# Patient Record
Sex: MALE | Race: WHITE | Employment: OTHER | URBAN - NONMETROPOLITAN AREA
[De-identification: names, ages, dates, MRNs, and addresses within clinical notes are randomized per-mention and may not be internally consistent; named-entity substitution may affect disease eponyms.]

---

## 2022-02-16 ENCOUNTER — OFFICE VISIT (OUTPATIENT)
Dept: FAMILY MEDICINE CLINIC | Age: 87
End: 2022-02-16

## 2022-02-16 VITALS
RESPIRATION RATE: 17 BRPM | OXYGEN SATURATION: 98 % | SYSTOLIC BLOOD PRESSURE: 130 MMHG | HEART RATE: 57 BPM | TEMPERATURE: 97.8 F | DIASTOLIC BLOOD PRESSURE: 60 MMHG

## 2022-02-16 DIAGNOSIS — I10 HYPERTENSION, UNSPECIFIED TYPE: Primary | ICD-10-CM

## 2022-02-16 DIAGNOSIS — Z79.01 WARFARIN ANTICOAGULATION: ICD-10-CM

## 2022-02-16 DIAGNOSIS — R39.14 BENIGN PROSTATIC HYPERPLASIA WITH INCOMPLETE BLADDER EMPTYING: ICD-10-CM

## 2022-02-16 DIAGNOSIS — N40.1 BENIGN PROSTATIC HYPERPLASIA WITH INCOMPLETE BLADDER EMPTYING: ICD-10-CM

## 2022-02-16 DIAGNOSIS — R60.0 LOWER EXTREMITY EDEMA: ICD-10-CM

## 2022-02-16 DIAGNOSIS — I48.20 CHRONIC ATRIAL FIBRILLATION (HCC): ICD-10-CM

## 2022-02-16 PROBLEM — N40.0 BPH (BENIGN PROSTATIC HYPERPLASIA): Status: ACTIVE | Noted: 2022-02-16

## 2022-02-16 LAB
INTERNATIONAL NORMALIZATION RATIO, POC: ABNORMAL
PROTHROMBIN TIME, POC: 1.7

## 2022-02-16 PROCEDURE — 85610 PROTHROMBIN TIME: CPT | Performed by: FAMILY MEDICINE

## 2022-02-16 PROCEDURE — 99204 OFFICE O/P NEW MOD 45 MIN: CPT | Performed by: FAMILY MEDICINE

## 2022-02-16 RX ORDER — FUROSEMIDE 40 MG/1
40 TABLET ORAL DAILY
Qty: 60 TABLET | Refills: 3 | Status: SHIPPED | OUTPATIENT
Start: 2022-02-16 | End: 2022-08-22 | Stop reason: SDUPTHER

## 2022-02-16 RX ORDER — ISOSORBIDE DINITRATE 30 MG/1
30 TABLET ORAL 4 TIMES DAILY
COMMUNITY
End: 2022-02-16 | Stop reason: SDUPTHER

## 2022-02-16 RX ORDER — FLUTICASONE PROPIONATE 110 UG/1
1 AEROSOL, METERED RESPIRATORY (INHALATION) 2 TIMES DAILY
Qty: 12 G | Refills: 1 | Status: SHIPPED | OUTPATIENT
Start: 2022-02-16 | End: 2022-10-04

## 2022-02-16 RX ORDER — AMLODIPINE BESYLATE 10 MG/1
10 TABLET ORAL DAILY
Qty: 90 TABLET | Refills: 0 | Status: SHIPPED | OUTPATIENT
Start: 2022-02-16 | End: 2022-06-02

## 2022-02-16 RX ORDER — WARFARIN SODIUM 2.5 MG/1
2.5 TABLET ORAL DAILY
Qty: 30 TABLET | Refills: 0 | Status: SHIPPED | OUTPATIENT
Start: 2022-02-16 | End: 2022-03-04 | Stop reason: DRUGHIGH

## 2022-02-16 RX ORDER — FLUTICASONE PROPIONATE 110 UG/1
1 AEROSOL, METERED RESPIRATORY (INHALATION) 2 TIMES DAILY
COMMUNITY
End: 2022-02-16 | Stop reason: SDUPTHER

## 2022-02-16 RX ORDER — CARVEDILOL 6.25 MG/1
6.25 TABLET ORAL 2 TIMES DAILY WITH MEALS
Qty: 60 TABLET | Refills: 2 | Status: SHIPPED | OUTPATIENT
Start: 2022-02-16 | End: 2022-05-31 | Stop reason: SDUPTHER

## 2022-02-16 RX ORDER — DIPHENHYDRAMINE HCL 25 MG
25 CAPSULE ORAL EVERY 6 HOURS PRN
COMMUNITY
End: 2022-02-16 | Stop reason: SDUPTHER

## 2022-02-16 RX ORDER — HYDRALAZINE HYDROCHLORIDE 25 MG/1
25 TABLET, FILM COATED ORAL 3 TIMES DAILY
Qty: 90 TABLET | Refills: 2 | Status: SHIPPED | OUTPATIENT
Start: 2022-02-16 | End: 2022-06-08 | Stop reason: SDUPTHER

## 2022-02-16 RX ORDER — TERAZOSIN 5 MG/1
5 CAPSULE ORAL NIGHTLY
COMMUNITY
End: 2022-02-16 | Stop reason: SDUPTHER

## 2022-02-16 RX ORDER — ALBUTEROL SULFATE 90 UG/1
2 AEROSOL, METERED RESPIRATORY (INHALATION) EVERY 6 HOURS PRN
COMMUNITY
End: 2022-02-16 | Stop reason: SDUPTHER

## 2022-02-16 RX ORDER — CARVEDILOL 6.25 MG/1
6.25 TABLET ORAL 2 TIMES DAILY WITH MEALS
COMMUNITY
End: 2022-02-16 | Stop reason: SDUPTHER

## 2022-02-16 RX ORDER — HYDRALAZINE HYDROCHLORIDE 25 MG/1
25 TABLET, FILM COATED ORAL 3 TIMES DAILY
COMMUNITY
End: 2022-02-16 | Stop reason: SDUPTHER

## 2022-02-16 RX ORDER — WARFARIN SODIUM 2.5 MG/1
2.5 TABLET ORAL
COMMUNITY
End: 2022-02-16 | Stop reason: SDUPTHER

## 2022-02-16 RX ORDER — TERAZOSIN 5 MG/1
5 CAPSULE ORAL NIGHTLY
Qty: 30 CAPSULE | Refills: 3 | Status: SHIPPED | OUTPATIENT
Start: 2022-02-16 | End: 2022-06-20 | Stop reason: SDUPTHER

## 2022-02-16 RX ORDER — POTASSIUM CHLORIDE 20 MEQ/1
20 TABLET, EXTENDED RELEASE ORAL DAILY
Qty: 180 TABLET | Refills: 1 | Status: SHIPPED | OUTPATIENT
Start: 2022-02-16

## 2022-02-16 RX ORDER — ALBUTEROL SULFATE 90 UG/1
2 AEROSOL, METERED RESPIRATORY (INHALATION) EVERY 6 HOURS PRN
Qty: 18 G | Refills: 0 | Status: SHIPPED | OUTPATIENT
Start: 2022-02-16 | End: 2022-10-04

## 2022-02-16 RX ORDER — AMLODIPINE BESYLATE 10 MG/1
10 TABLET ORAL DAILY
COMMUNITY
End: 2022-02-16 | Stop reason: SDUPTHER

## 2022-02-16 RX ORDER — ISOSORBIDE DINITRATE 30 MG/1
30 TABLET ORAL 4 TIMES DAILY
Qty: 120 TABLET | Refills: 2 | Status: SHIPPED | OUTPATIENT
Start: 2022-02-16 | End: 2022-10-04

## 2022-02-16 RX ORDER — DIPHENHYDRAMINE HCL 25 MG
25 CAPSULE ORAL EVERY 6 HOURS PRN
Qty: 30 CAPSULE | Refills: 2 | Status: SHIPPED | OUTPATIENT
Start: 2022-02-16 | End: 2022-03-18

## 2022-02-16 NOTE — PROGRESS NOTES
[facility]  [address]  [address] [place] [address]  Dept: [phone]  Loc: [phone]        ASSESSMENT/PLAN:    1. Hypertension, unspecified type  2. Chronic atrial fibrillation (HCC)  -     Potassium; Future  -     Comprehensive Metabolic Panel; Future  3. Benign prostatic hyperplasia with incomplete bladder emptying  4. Lower extremity edema  5. Warfarin anticoagulation  -     POCT INR  Patient reports a history of hypertension, chronic A. fib, BPH and lower extremity edema. Patient reports that he was put on Lasix, however, he ran out of his Lasix and has noticed increased swelling of his bilateral lower extremity. Patient has signed a record release so we can get his records from [place] to see how much cardiac work-up patient has had. Based on records, may refer patient to cardiology and or pulmonology. Patient and family are agreeable to follow-up in 4 to 6 weeks    Return in about 4 weeks (around 3/16/2022). Patient's Name: Jalen Santoro   YOB: 1935   Age: 80 y.o. Date of service: 2/16/2022    Chief Complaint:   Chief Complaint   Patient presents with   [place] Establish Care    COPD    Peripheral Neuropathy        Patient ID: Jalen Santoro is a 80 y.o. male. Chief Complaint   Patient presents with    Establish Care    COPD    Peripheral Neuropathy       HPI    Patient is an 80-year-old male with a history of incarceration, COPD, hypertension and chronic A. fib who presents to the office accompanied by daughter and son-in-law for medication review. Patient was recently released from group home in [place] and was sent home with 30-day supply of his medications. Patient reports that he ran out  of his medication so he came for refill. Patient has signed a release of records so we can get his records from [place]. Patient reports a history of A. fib, BPH, hypertension and COPD.   Patient is presently on 2 L of oxygen via nasal cannula. Patient reports that he was put on oxygen about 6 months ago due to shortness of breath. 12 point review of systems were negative other than in the HPI     Physical Exam  General: Blind, wheel chair bound, oriented to time, place, person, and situation. Not in acute distress   Ear, nose, throat, Head: Normal external ears, pupils are equally round, EOMI, normocephalic/atraumatic  Heart: regular rate and rhythm, no audible murmurs, no audible friction rub  Lungs: clear to auscultation bilaterally, no audible crackles, no audible wheezes, no audible rhonchi    Abdomen: normal bowel sounds, soft abdomen, non-tender, no palpable masses  Extremities: edema present, warm, no cyanosis, no clubbing. Good capillary refill   Peripheral vascular: 2+ pulses symmetric (radial)  Neuro: sensation intact and symmetric  Psych: normal affect, normal thoughts     Patient History:  History reviewed. No pertinent past medical history. History reviewed. No pertinent surgical history. Social History     Socioeconomic History    Marital status: Single     Spouse name: Not on file    Number of children: Not on file    Years of education: Not on file    Highest education level: Not on file   Occupational History    Not on file   Tobacco Use    Smoking status: Never Smoker    Smokeless tobacco: Never Used   Vaping Use    Vaping Use: Never used   Substance and Sexual Activity    Alcohol use: Never    Drug use: Never    Sexual activity: Not on file   Other Topics Concern    Not on file   Social History Narrative    Not on file     Social Determinants of Health     Financial Resource Strain:     Difficulty of Paying Living Expenses: Not on file   Food Insecurity:     Worried About [address] in the Last Year: Not on file    Shil[place] of Food in the Last Year: Not on file   Transportation Needs:     Lack of Transportation (Medical): Not on file    Lack of Transportation (Non-Medical):  Not 108 (90 Base) MCG/ACT inhaler Inhale 2 puffs into the lungs every 6 hours as needed for Wheezing or Shortness of Breath 18 g 0    furosemide (LASIX) 40 MG tablet Take 1 tablet by mouth daily 60 tablet 3    potassium chloride (KLOR-CON M) 20 MEQ extended release tablet Take 1 tablet by mouth daily 180 tablet 1     No current facility-administered medications for this visit. Objective:  Vitals:  Vitals:    02/16/22 1037   BP: 130/60   Pulse: 57   Resp: 17   Temp: 97.8 °F (36.6 °C)   SpO2: 98%      BP Readings from Last 4 Encounters:   02/16/22 130/60      There is no height or weight on file to calculate BMI. All questions answered to patient's satisfaction. The patient was counseled regarding impressions, instructions for management and importance of compliance with treatment. Return in about 4 weeks (around 3/16/2022).     iSndi Asif MD

## 2022-02-25 ENCOUNTER — TELEPHONE (OUTPATIENT)
Dept: FAMILY MEDICINE CLINIC | Age: 87
End: 2022-02-25

## 2022-02-25 NOTE — TELEPHONE ENCOUNTER
Called and spoke with the daughter-no records as of yet-her son was able to get them-she will fax them to you

## 2022-02-25 NOTE — TELEPHONE ENCOUNTER
Pt. Daughter called to see if we have received records for her father. I did not see any in epic. Please advise/contact pt. With updated information regarding records.

## 2022-03-04 ENCOUNTER — TELEPHONE (OUTPATIENT)
Dept: FAMILY MEDICINE CLINIC | Age: 87
End: 2022-03-04

## 2022-03-04 DIAGNOSIS — I48.20 CHRONIC ATRIAL FIBRILLATION (HCC): Primary | ICD-10-CM

## 2022-03-04 RX ORDER — WARFARIN SODIUM 5 MG/1
5 TABLET ORAL DAILY
Qty: 30 TABLET | Refills: 3 | Status: SHIPPED | OUTPATIENT
Start: 2022-03-04 | End: 2022-06-21

## 2022-03-04 NOTE — TELEPHONE ENCOUNTER
Called spoke with son discussed dosages for his warfarin and isobide-error was placed at first appt-instructions given regarding meds and instructions-they verbalize understanding

## 2022-03-04 NOTE — TELEPHONE ENCOUNTER
Patients daughter calling states they have questions about medications. Warfarin     Isosorbide dinitrate 30mg    These meds have been changed. Please call for clarification.

## 2022-03-21 ENCOUNTER — HOSPITAL ENCOUNTER (OUTPATIENT)
Age: 87
Discharge: HOME | End: 2022-03-21

## 2022-03-21 LAB
ALBUMIN SERPL-MCNC: 4.3 GM/DL (ref 3.4–5)
ALP BLD-CCNC: 87 IU/L (ref 40–128)
ALT SERPL-CCNC: 7 U/L (ref 10–40)
ANION GAP SERPL CALCULATED.3IONS-SCNC: 17 MMOL/L (ref 4–16)
AST SERPL-CCNC: 15 IU/L (ref 15–37)
BILIRUB SERPL-MCNC: 0.5 MG/DL (ref 0–1)
BUN BLDV-MCNC: 29 MG/DL (ref 6–23)
CALCIUM SERPL-MCNC: 8.7 MG/DL (ref 8.3–10.6)
CHLORIDE BLD-SCNC: 103 MMOL/L (ref 99–110)
CO2: 22 MMOL/L (ref 21–32)
CREAT SERPL-MCNC: 2.2 MG/DL (ref 0.9–1.3)
GFR AFRICAN AMERICAN: 35 ML/MIN/1.73M2
GFR NON-AFRICAN AMERICAN: 29 ML/MIN/1.73M2
GLUCOSE BLD-MCNC: 126 MG/DL (ref 70–99)
POTASSIUM SERPL-SCNC: 4.7 MMOL/L (ref 3.5–5.1)
SODIUM BLD-SCNC: 142 MMOL/L (ref 135–145)
TOTAL PROTEIN: 7.4 GM/DL (ref 6.4–8.2)

## 2022-03-21 PROCEDURE — 36415 COLL VENOUS BLD VENIPUNCTURE: CPT

## 2022-03-21 PROCEDURE — 80053 COMPREHEN METABOLIC PANEL: CPT

## 2022-03-22 ENCOUNTER — OFFICE VISIT (OUTPATIENT)
Dept: FAMILY MEDICINE CLINIC | Age: 87
End: 2022-03-22

## 2022-03-22 VITALS
WEIGHT: 200.2 LBS | TEMPERATURE: 97 F | SYSTOLIC BLOOD PRESSURE: 110 MMHG | RESPIRATION RATE: 14 BRPM | HEART RATE: 82 BPM | DIASTOLIC BLOOD PRESSURE: 60 MMHG | OXYGEN SATURATION: 95 %

## 2022-03-22 DIAGNOSIS — I10 HYPERTENSION, UNSPECIFIED TYPE: ICD-10-CM

## 2022-03-22 DIAGNOSIS — Z79.01 WARFARIN ANTICOAGULATION: ICD-10-CM

## 2022-03-22 DIAGNOSIS — I48.20 CHRONIC ATRIAL FIBRILLATION (HCC): ICD-10-CM

## 2022-03-22 LAB
INTERNATIONAL NORMALIZATION RATIO, POC: 1.6
PROTHROMBIN TIME, POC: ABNORMAL

## 2022-03-22 PROCEDURE — 99214 OFFICE O/P EST MOD 30 MIN: CPT | Performed by: FAMILY MEDICINE

## 2022-03-22 PROCEDURE — 85610 PROTHROMBIN TIME: CPT | Performed by: FAMILY MEDICINE

## 2022-03-22 ASSESSMENT — PATIENT HEALTH QUESTIONNAIRE - PHQ9
SUM OF ALL RESPONSES TO PHQ QUESTIONS 1-9: 0
SUM OF ALL RESPONSES TO PHQ QUESTIONS 1-9: 0
SUM OF ALL RESPONSES TO PHQ9 QUESTIONS 1 & 2: 0
2. FEELING DOWN, DEPRESSED OR HOPELESS: 0
1. LITTLE INTEREST OR PLEASURE IN DOING THINGS: 0
SUM OF ALL RESPONSES TO PHQ QUESTIONS 1-9: 0
SUM OF ALL RESPONSES TO PHQ QUESTIONS 1-9: 0

## 2022-03-22 NOTE — PROGRESS NOTES
[facility]  [address]  [address] [place] [address]  Dept: [phone]  Loc: [phone]        ASSESSMENT/PLAN:    1. Chronic atrial fibrillation ([address]  2. Hypertension, unspecified type  3. Warfarin anticoagulation  -     POCT INR  Patient reports taking his medication as prescribed. Patient denies any episodes of exacerbation of his A. fib. Blood pressure in the office today is controlled, patient was encouraged to continue taking medication as prescribed  INR is 1.6 in the office today, patient was told to take Coumadin 10 mg today and return to his daily comadin dose and to recheck INR in 2 weeks     Return in about 6 months (around 9/22/2022). Patient's Name: Jose Francisco Aleman   YOB: 1935   Age: 80 y.o. Date of service: 3/22/2022    Chief Complaint:   Chief Complaint   Patient presents with    Follow-up     patient is here for 1 month follow up     Discuss Labs     discuss labs         Patient ID: Jose Francisco Aleman is a 80 y.o. male. Chief Complaint   Patient presents with    Follow-up     patient is here for 1 month follow up     Discuss Labs     discuss labs        HPI    Patient is a 80-year-old male with a history of chronic A. fib, not in exacerbation, hypertension, chronic anticoagulation and other medical conditions who presents to the office accompanied by daughter and son-in-law for follow-up. Patient reports taking his medications as prescribed. Patient reports pressure sore on his buttocks which he has been using OTC therapy with some help. Patient inquired whether if he takes testosterone it will help with his heart muscles. Patient was educated on the use of testosterone. 12 point review of systems were negative other than in the HPI     Physical Exam  General: blind on a wheel chair,  and oriented to time, place, person, and situation.  Not in acute distress   Ear, nose, throat, Head: Normal external ears, pupils are equally round, EOMI, normocephalic/atraumatic  Heart: regular rate and rhythm, no audible murmurs, no audible friction rub  Lungs: clear to auscultation bilaterally, no audible crackles, no audible wheezes, no audible rhonchi    : Healing pressure sore present on buttocks,mild erythema,  no drainage,   Abdomen: normal bowel sounds, soft abdomen, non-tender, no palpable masses  Extremities: Mild bilateral lower extremity edema, compression stockings in place. Good capillary refill  Peripheral vascular: 2+ pulses symmetric (radial)  Neuro: sensation intact and symmetric  Psych: normal affect, normal thoughts     Patient History:  History reviewed. No pertinent past medical history. No past surgical history on file. Social History     Socioeconomic History    Marital status: Single     Spouse name: Not on file    Number of children: Not on file    Years of education: Not on file    Highest education level: Not on file   Occupational History    Not on file   Tobacco Use    Smoking status: Never Smoker    Smokeless tobacco: Never Used   Vaping Use    Vaping Use: Never used   Substance and Sexual Activity    Alcohol use: Never    Drug use: Never    Sexual activity: Not on file   Other Topics Concern    Not on file   Social History Narrative    Not on file     Social Determinants of Health     Financial Resource Strain:     Difficulty of Paying Living Expenses: Not on file   Food Insecurity:     Worried About [address] [employer] in the Last Year: Not on file    Shilpa of Food in the Last Year: Not on file   Transportation Needs:     Lack of Transportation (Medical): Not on file    Lack of Transportation (Non-Medical):  Not on file   Physical Activity:     Days of Exercise per Week: Not on file    Minutes of Exercise per Session: Not on file   Stress:     Feeling of Stress : Not on file   Social Connections:     Frequency of Communication with Friends and Family: Not on file    Frequency of Social Gatherings with Friends and Family: Not on file    Attends Yazdanism Services: Not on file    Active Member of Clubs or Organizations: Not on file    Attends Club or Organization Meetings: Not on file    Marital Status: Not on file   Intimate Partner Violence:     Fear of Current or Ex-Partner: Not on file    Emotionally Abused: Not on file    Physically Abused: Not on file    Sexually Abused: Not on file   Housing Stability:     Unable to Pay for Housing in the Last Year: Not on file    Number of [place] in the Last Year: Not on file    Unstable Housing in the Last Year: Not on file       There is no immunization history on file for this patient. No Known Allergies  No family history on file.       Current Outpatient Medications   Medication Sig Dispense Refill    warfarin (COUMADIN) 5 MG tablet Take 1 tablet by mouth daily 30 tablet 3    Multiple Vitamins-Minerals (ICAPS AREDS 2 PO) Take by mouth      terazosin (HYTRIN) 5 MG capsule Take 1 capsule by mouth nightly 30 capsule 3    isosorbide dinitrate (ISORDIL) 30 MG tablet Take 1 tablet by mouth 4 times daily (Patient taking differently: Take 30 mg by mouth daily ) 120 tablet 2    hydrALAZINE (APRESOLINE) 25 MG tablet Take 1 tablet by mouth 3 times daily 90 tablet 2    fluticasone (FLOVENT HFA) 110 MCG/ACT inhaler Inhale 1 puff into the lungs 2 times daily 12 g 1    carvedilol (COREG) 6.25 MG tablet Take 1 tablet by mouth 2 times daily (with meals) 60 tablet 2    amLODIPine (NORVASC) 10 MG tablet Take 1 tablet by mouth daily 90 tablet 0    albuterol sulfate HFA (PROAIR HFA) 108 (90 Base) MCG/ACT inhaler Inhale 2 puffs into the lungs every 6 hours as needed for Wheezing or Shortness of Breath 18 g 0    furosemide (LASIX) 40 MG tablet Take 1 tablet by mouth daily 60 tablet 3    potassium chloride (KLOR-CON M) 20 MEQ extended release tablet Take 1 tablet by mouth daily 180 tablet 1     No current facility-administered medications for this visit. Objective:  Vitals:  Vitals:    03/22/22 1035   BP: 110/60   Pulse: 82   Resp: 14   Temp: 97 °F (36.1 °C)   SpO2: 95%      BP Readings from Last 4 Encounters:   03/22/22 110/60   02/16/22 130/60      There is no height or weight on file to calculate BMI. All questions answered to patient's satisfaction. The patient was counseled regarding impressions, instructions for management and importance of compliance with treatment. Return in about 6 months (around 9/22/2022).     Penny Schuler MD

## 2022-04-05 ENCOUNTER — ANTI-COAG VISIT (OUTPATIENT)
Dept: FAMILY MEDICINE CLINIC | Age: 87
End: 2022-04-05

## 2022-04-05 ENCOUNTER — NURSE ONLY (OUTPATIENT)
Dept: FAMILY MEDICINE CLINIC | Age: 87
End: 2022-04-05

## 2022-04-05 VITALS — TEMPERATURE: 97.2 F

## 2022-04-05 DIAGNOSIS — I48.20 CHRONIC ATRIAL FIBRILLATION (HCC): Primary | ICD-10-CM

## 2022-04-05 DIAGNOSIS — Z79.01 WARFARIN ANTICOAGULATION: ICD-10-CM

## 2022-04-05 LAB
INTERNATIONAL NORMALIZATION RATIO, POC: ABNORMAL
PROTHROMBIN TIME, POC: 1.9

## 2022-04-05 PROCEDURE — 85610 PROTHROMBIN TIME: CPT | Performed by: FAMILY MEDICINE

## 2022-04-08 ENCOUNTER — ANTI-COAG VISIT (OUTPATIENT)
Dept: FAMILY MEDICINE CLINIC | Age: 87
End: 2022-04-08

## 2022-04-19 ENCOUNTER — NURSE ONLY (OUTPATIENT)
Dept: FAMILY MEDICINE CLINIC | Age: 87
End: 2022-04-19

## 2022-04-19 VITALS — TEMPERATURE: 98.4 F

## 2022-04-19 DIAGNOSIS — I48.20 CHRONIC ATRIAL FIBRILLATION (HCC): ICD-10-CM

## 2022-04-19 DIAGNOSIS — Z79.01 WARFARIN ANTICOAGULATION: Primary | ICD-10-CM

## 2022-04-19 LAB
INTERNATIONAL NORMALIZATION RATIO, POC: 1.6
PROTHROMBIN TIME, POC: ABNORMAL

## 2022-04-19 PROCEDURE — 85610 PROTHROMBIN TIME: CPT | Performed by: FAMILY MEDICINE

## 2022-04-25 ENCOUNTER — NURSE ONLY (OUTPATIENT)
Dept: FAMILY MEDICINE CLINIC | Age: 87
End: 2022-04-25

## 2022-04-25 DIAGNOSIS — I48.20 CHRONIC ATRIAL FIBRILLATION (HCC): Primary | ICD-10-CM

## 2022-04-25 LAB — INTERNATIONAL NORMALIZATION RATIO, POC: 2.4

## 2022-04-25 PROCEDURE — 85610 PROTHROMBIN TIME: CPT | Performed by: FAMILY MEDICINE

## 2022-04-26 ENCOUNTER — ANTI-COAG VISIT (OUTPATIENT)
Dept: FAMILY MEDICINE CLINIC | Age: 87
End: 2022-04-26

## 2022-05-24 ENCOUNTER — ANTI-COAG VISIT (OUTPATIENT)
Dept: FAMILY MEDICINE CLINIC | Age: 87
End: 2022-05-24

## 2022-05-31 ENCOUNTER — TELEPHONE (OUTPATIENT)
Dept: FAMILY MEDICINE CLINIC | Age: 87
End: 2022-05-31

## 2022-05-31 RX ORDER — CARVEDILOL 6.25 MG/1
6.25 TABLET ORAL 2 TIMES DAILY WITH MEALS
Qty: 60 TABLET | Refills: 0 | Status: SHIPPED | OUTPATIENT
Start: 2022-05-31 | End: 2022-07-05 | Stop reason: SDUPTHER

## 2022-05-31 NOTE — TELEPHONE ENCOUNTER
----- Message from Jose Ling sent at 5/31/2022 10:24 AM EDT -----  Subject: Refill Request    QUESTIONS  Name of Medication? carvedilol (COREG) 6.25 MG tablet  Patient-reported dosage and instructions? twice a day   How many days do you have left? 3  Preferred [facility]? [id number]  Pharmacy phone number (if available)? [phone]  ---------------------------------------------------------------------------  --------------,  Name of Medication? amLODIPine (NORVASC) 10 MG tablet  Patient-reported dosage and instructions? once a day   How many days do you have left? 5  [facility]erred [facility]? [id number]  Pharmacy phone number (if available)? [phone]  ---------------------------------------------------------------------------  --------------  CALL BACK INFO  What is the best way for the office to contact you? OK to leave message on   voicemail  Preferred Call Back Phone Number? [phone]  ---------------------------------------------------------------------------  --------------  SCRIPT ANSWERS  Relationship to Patient? Other  Representative Name? daughter  Is the Representative on the appropriate HIPAA document in Epic?  Yes

## 2022-05-31 NOTE — TELEPHONE ENCOUNTER
----- Message from Damaris Stroud sent at 5/31/2022 10:24 AM EDT -----  Subject: Refill Request    QUESTIONS  Name of Medication? carvedilol (COREG) 6.25 MG tablet  Patient-reported dosage and instructions? twice a day   How many days do you have left? 3  [facility]erred [id number]  Pharmacy phone number (if available)? [phone]  ---------------------------------------------------------------------------  --------------,  Name of Medication? amLODIPine (NORVASC) 10 MG tablet  Patient-reported dosage and instructions? once a day   How many days do you have left? 5  [facility]erred [facility]? [id number]  Pharmacy phone number (if available)? [phone]  ---------------------------------------------------------------------------  --------------  CALL BACK INFO  What is the best way for the office to contact you? OK to leave message on   voicemail  Preferred Call Back Phone Number? [phone]  ---------------------------------------------------------------------------  --------------  SCRIPT ANSWERS  Relationship to Patient? Other  Representative Name? daughter  Is the Representative on the appropriate HIPAA document in Epic?  Yes

## 2022-05-31 NOTE — TELEPHONE ENCOUNTER
----- Message from Jose Ling sent at 5/31/2022 10:24 AM EDT -----  Subject: Refill Request    QUESTIONS  Name of Medication? carvedilol (COREG) 6.25 MG tablet  Patient-reported dosage and instructions? twice a day   How many days do you have left? 3  Preferred [facility]? [id number]  Pharmacy phone number (if available)? [phone]  ---------------------------------------------------------------------------  --------------,  Name of Medication? amLODIPine (NORVASC) 10 MG tablet  Patient-reported dosage and instructions? once a day   How many days do you have left? 5  [facility]erred [facility]? [id number]  Pharmacy phone number (if available)? [phone]  ---------------------------------------------------------------------------  --------------  CALL BACK INFO  What is the best way for the office to contact you? OK to leave message on   voicemail  Preferred Call Back Phone Number? [phone]  ---------------------------------------------------------------------------  --------------  SCRIPT ANSWERS  Relationship to Patient? Other  Representative Name? daughter  Is the Representative on the appropriate HIPAA document in Epic?  Yes

## 2022-05-31 NOTE — TELEPHONE ENCOUNTER
----- Message from Tammie Robertson sent at 5/31/2022 10:24 AM EDT -----  Subject: Refill Request    QUESTIONS  Name of Medication? carvedilol (COREG) 6.25 MG tablet  Patient-reported dosage and instructions? twice a day   How many days do you have left? 3  [facility]erred [facility]? [id number]  Pharmacy phone number (if available)? [phone]  ---------------------------------------------------------------------------  --------------,  Name of Medication? amLODIPine (NORVASC) 10 MG tablet  Patient-reported dosage and instructions? once a day   How many days do you have left? 5  Preferred [facility]? [id number]  Pharmacy phone number (if available)? [phone]  ---------------------------------------------------------------------------  --------------  CALL BACK INFO  What is the best way for the office to contact you? OK to leave message on   voicemail  Preferred Call Back Phone Number? [phone]  ---------------------------------------------------------------------------  --------------  SCRIPT ANSWERS  Relationship to Patient? Other  Representative Name? daughter  Is the Representative on the appropriate HIPAA document in Epic?  Yes

## 2022-06-02 RX ORDER — AMLODIPINE BESYLATE 10 MG/1
TABLET ORAL
Qty: 30 TABLET | Refills: 0 | Status: SHIPPED | OUTPATIENT
Start: 2022-06-02 | End: 2022-07-05

## 2022-06-08 ENCOUNTER — ANTI-COAG VISIT (OUTPATIENT)
Dept: FAMILY MEDICINE CLINIC | Age: 87
End: 2022-06-08

## 2022-06-08 RX ORDER — HYDRALAZINE HYDROCHLORIDE 25 MG/1
25 TABLET, FILM COATED ORAL 3 TIMES DAILY
Qty: 90 TABLET | Refills: 0 | Status: SHIPPED | OUTPATIENT
Start: 2022-06-08 | End: 2022-07-05 | Stop reason: SDUPTHER

## 2022-06-20 DIAGNOSIS — I48.20 CHRONIC ATRIAL FIBRILLATION (HCC): ICD-10-CM

## 2022-06-21 DIAGNOSIS — I48.20 CHRONIC ATRIAL FIBRILLATION (HCC): ICD-10-CM

## 2022-06-21 RX ORDER — WARFARIN SODIUM 5 MG/1
TABLET ORAL
Qty: 30 TABLET | Refills: 3 | Status: SHIPPED | OUTPATIENT
Start: 2022-06-21 | End: 2022-08-22

## 2022-06-21 RX ORDER — TERAZOSIN 5 MG/1
5 CAPSULE ORAL NIGHTLY
Qty: 30 CAPSULE | Refills: 3 | Status: SHIPPED | OUTPATIENT
Start: 2022-06-21

## 2022-06-21 RX ORDER — WARFARIN SODIUM 5 MG/1
5 TABLET ORAL DAILY
Qty: 30 TABLET | Refills: 3 | Status: SHIPPED | OUTPATIENT
Start: 2022-06-21

## 2022-07-05 ENCOUNTER — ANTI-COAG VISIT (OUTPATIENT)
Dept: FAMILY MEDICINE CLINIC | Age: 87
End: 2022-07-05

## 2022-07-05 RX ORDER — CARVEDILOL 6.25 MG/1
6.25 TABLET ORAL 2 TIMES DAILY WITH MEALS
Qty: 60 TABLET | Refills: 0 | Status: SHIPPED | OUTPATIENT
Start: 2022-07-05

## 2022-07-05 RX ORDER — HYDRALAZINE HYDROCHLORIDE 25 MG/1
25 TABLET, FILM COATED ORAL 3 TIMES DAILY
Qty: 90 TABLET | Refills: 0 | Status: SHIPPED | OUTPATIENT
Start: 2022-07-05 | End: 2022-10-04

## 2022-07-05 RX ORDER — AMLODIPINE BESYLATE 10 MG/1
TABLET ORAL
Qty: 30 TABLET | Refills: 0 | Status: SHIPPED | OUTPATIENT
Start: 2022-07-05

## 2022-07-05 RX ORDER — AMLODIPINE BESYLATE 10 MG/1
TABLET ORAL
Qty: 30 TABLET | Refills: 0 | Status: SHIPPED | OUTPATIENT
Start: 2022-07-05 | End: 2022-07-05 | Stop reason: SDUPTHER

## 2022-07-14 ENCOUNTER — ANTI-COAG VISIT (OUTPATIENT)
Dept: FAMILY MEDICINE CLINIC | Age: 87
End: 2022-07-14

## 2022-08-04 ENCOUNTER — TELEPHONE (OUTPATIENT)
Dept: CARDIOLOGY CLINIC | Age: 87
End: 2022-08-04

## 2022-08-04 ENCOUNTER — ANTI-COAG VISIT (OUTPATIENT)
Dept: FAMILY MEDICINE CLINIC | Age: 87
End: 2022-08-04

## 2022-08-04 NOTE — TELEPHONE ENCOUNTER
Left message for patient requesting a return call to schedule a consult for paroxymal atrial fibrillation & possibly some CHF per referral from [facility]. (3) no apparent problem

## 2022-08-22 ENCOUNTER — INITIAL CONSULT (OUTPATIENT)
Dept: CARDIOLOGY CLINIC | Age: 87
End: 2022-08-22
Payer: MEDICARE

## 2022-08-22 VITALS
HEART RATE: 90 BPM | RESPIRATION RATE: 16 BRPM | HEIGHT: 76 IN | SYSTOLIC BLOOD PRESSURE: 122 MMHG | DIASTOLIC BLOOD PRESSURE: 50 MMHG | WEIGHT: 214 LBS | BODY MASS INDEX: 26.06 KG/M2 | OXYGEN SATURATION: 95 %

## 2022-08-22 DIAGNOSIS — I44.7 LBBB (LEFT BUNDLE BRANCH BLOCK): ICD-10-CM

## 2022-08-22 DIAGNOSIS — I10 HYPERTENSION, UNSPECIFIED TYPE: Primary | ICD-10-CM

## 2022-08-22 DIAGNOSIS — I48.0 PAROXYSMAL ATRIAL FIBRILLATION (HCC): ICD-10-CM

## 2022-08-22 DIAGNOSIS — N18.31 STAGE 3A CHRONIC KIDNEY DISEASE (HCC): ICD-10-CM

## 2022-08-22 DIAGNOSIS — H54.8 LEGALLY BLIND: ICD-10-CM

## 2022-08-22 DIAGNOSIS — R07.2 PRECORDIAL PAIN: ICD-10-CM

## 2022-08-22 DIAGNOSIS — I48.91 ATRIAL FIBRILLATION, UNSPECIFIED TYPE (HCC): ICD-10-CM

## 2022-08-22 DIAGNOSIS — I50.33 ACUTE ON CHRONIC DIASTOLIC CONGESTIVE HEART FAILURE (HCC): ICD-10-CM

## 2022-08-22 DIAGNOSIS — R06.02 SOB (SHORTNESS OF BREATH): ICD-10-CM

## 2022-08-22 PROCEDURE — 99204 OFFICE O/P NEW MOD 45 MIN: CPT | Performed by: INTERNAL MEDICINE

## 2022-08-22 PROCEDURE — 93000 ELECTROCARDIOGRAM COMPLETE: CPT | Performed by: INTERNAL MEDICINE

## 2022-08-22 RX ORDER — FUROSEMIDE 40 MG/1
40 TABLET ORAL EVERY OTHER DAY
Qty: 60 TABLET | Refills: 3 | Status: SHIPPED | OUTPATIENT
Start: 2022-08-22

## 2022-08-22 RX ORDER — FUROSEMIDE 40 MG/1
40 TABLET ORAL EVERY OTHER DAY
Qty: 90 TABLET | Refills: 1 | Status: SHIPPED | OUTPATIENT
Start: 2022-08-22 | End: 2022-10-04

## 2022-08-22 RX ORDER — CHLORAL HYDRATE 500 MG
CAPSULE ORAL
COMMUNITY

## 2022-08-22 NOTE — ASSESSMENT & PLAN NOTE
He appears somewhat volume overloaded does have 1+ ankle edema increase Lasix to 40 twice daily on alternate days and 40 daily.   Take potassium extra when he takes extra Lasix

## 2022-08-22 NOTE — PATIENT INSTRUCTIONS
Please hold on to these instructions the  will call you within 1-9 business days when we receive authorization from your insurance. Echocardiogram    WHAT TO EXPECT:   This test will take approximately 45 minutes. It is an ultrasound of the heart. It can look at the valves and chambers inside the heart   There is no special instructions for this test.     If you are unable to keep this appointment, please notify us 24 hours prior to test at [phone]. Please hold on to these instructions the  will call you within 1-9 business days when we receive authorization from your insurance. Nuclear Stress Test    WHAT TO EXPECT:   You will need to confirm the test or it could be cancelled. This test will take approximately 2 hours: 1 hour in the AM &    1 hour in the PM. You will be given a time by the Technologist after the first part is completed to come back. You will be given a medication, through an IV in the hand, this will safely simulate exercise. This IV is also needed to inject the radioactive isotope unless you are able toe walk the treadmill. You will receive an injection in the AM & PM before the pictures. Using a special camera, you will have one set of pictures of your heart taken in the AM and a set of pictures in the PM.     PREPARATION FOR TEST:  Eat a light breakfast such as water or juice and toast.  If you are DIABETIC: Eat a normal breakfast with NO CAFFEINE and take your insulin as normal.   AVOID ALL FOODS & DRINKS containing CAFFEINE 12 HOURS PRIOR TO THE TEST: Including coffee, Tea, Nayan and other soft drinks even those labeled  caffeine free or decaffeinated. HOLD THESE MEDICATIONS Persantine & Theophylline (Theodur)  24 hours prior & bring your inhaler with you. The physician will specify if the following Beta blockers need to be held for 24 hours prior to test: [address] Laboratory Locations - No appointment necessary.   Sites open Monday to Friday. Call your preferred location for test preparation, business hours and other information you need. [employer] accepts [employer]. [facility] [address] [place], [address] [facility]  Phone: [phone] [facility] [address] [facility]  [address].   [facility], [address] [facility]  Phone: [phone]

## 2022-08-22 NOTE — ASSESSMENT & PLAN NOTE
Chest x-ray, check BNP, get echo, increase Lasix to 40 twice daily on alternate days and 40 mg daily until we get more data.   We had extensive discussion advised to cut down on salt intake

## 2022-08-22 NOTE — ASSESSMENT & PLAN NOTE
His blood pressure in the office todayCarvedilol 6.25 twice daily amlodipine 10 mg daily and he takes Hytrin 5 mg at night.

## 2022-08-22 NOTE — ASSESSMENT & PLAN NOTE
EKG today shows left bundle branch block with PVCs and sinus rhythm. He is on Coumadin. Followed by PCP?

## 2022-08-22 NOTE — PROGRESS NOTES
CARDIOLOGY  NOTE    Chief Complaint: SOB/ afib/ LBBB    HPI:   Elías Osorio is a 80y.o. year old who has Past medical history as noted below. Very pleasant gentleman comes in with his daughter and son-in-law he is just moved to [facility] from [place]. He is wheelchair-bound mostly because he is legally blind. He is here for further discussion due to bilateral lower extremity swelling and concerns for congestive heart failure. Reports that he has been  Sleeping in lounge chair, once in awhile gest chets pain, belching helps   Lower extremity swelling is ongoing he is taking Lasix his EKG shows left bundle branch block. He is short of breath when he exerts himself he is to work with hydraulic pumps and wants to understand what is going on with his heart reportedly he has history of A. fib he is on Coumadin but really had echo and stress test in [place] sometime back and used to see cardiology over there he is here for further discussion and establishing care. Have history of chronic kidney disease with creatinine 2.2 range.   Is 122 x 50 he denies feeling lightheaded or dizzy for now continue taking  Moved here from [place] started seeing Dr. Josué Zarate recently    Current Outpatient Medications   Medication Sig Dispense Refill    Oxygen Concentrator       Omega-3 Fatty Acids (OMEGA-3 FISH OIL) 1000 MG CAPS Take by mouth      diphenhydrAMINE HCl (BENADRYL ALLERGY PO) Take by mouth 25 mg 1 AM, 2 QPM      furosemide (LASIX) 40 MG tablet Take 1 tablet by mouth every other day 60 tablet 3    furosemide (LASIX) 40 MG tablet Take 1 tablet by mouth every other day 90 tablet 1    carvedilol (COREG) 6.25 MG tablet Take 1 tablet by mouth 2 times daily (with meals) 60 tablet 0    hydrALAZINE (APRESOLINE) 25 MG tablet Take 1 tablet by mouth 3 times daily 90 tablet 0    amLODIPine (NORVASC) 10 MG tablet TAKE 1 TABLET BY MOUTH EVERY DAY 30 tablet 0    terazosin (HYTRIN) 5 MG capsule Take 1 capsule by mouth nightly 30 capsule 3    warfarin (COUMADIN) 5 MG tablet Take 1 tablet by mouth daily 30 tablet 3    Multiple Vitamins-Minerals (ICAPS AREDS 2 PO) Take by mouth      isosorbide dinitrate (ISORDIL) 30 MG tablet Take 1 tablet by mouth 4 times daily (Patient taking differently: Take 30 mg by mouth daily PATIENT ONLY TAKING 1 TABLET PO ONCE DAILY) 120 tablet 2    potassium chloride (KLOR-CON M) 20 MEQ extended release tablet Take 1 tablet by mouth daily 180 tablet 1    fluticasone (FLOVENT HFA) 110 MCG/ACT inhaler Inhale 1 puff into the lungs 2 times daily (Patient not taking: Reported on 8/22/2022) 12 g 1    albuterol sulfate HFA (PROAIR HFA) 108 (90 Base) MCG/ACT inhaler Inhale 2 puffs into the lungs every 6 hours as needed for Wheezing or Shortness of Breath (Patient not taking: Reported on 8/22/2022) 18 g 0     No current facility-administered medications for this visit. Allergies:   Patient has no known allergies. Patient History:  No past medical history on file. No past surgical history on file. No family history on file. Social History     Tobacco Use    Smoking status: Never    Smokeless tobacco: Never   Substance Use Topics    Alcohol use: Never        Review of Systems:   Constitutional: No Fever or Weight Loss   Eyes: legally blind   ENT: No Headaches, Hearing Loss or Vertigo  Cardiovascular: as per note above   Respiratory: No cough or wheezing and as per note above.    Gastrointestinal: No abdominal pain, appetite loss, blood in stools, constipation, diarrhea or heartburn  Genitourinary: No dysuria, trouble voiding, or hematuria  Musculoskeletal:  denies any new  joint aches , swelling  or pain   Integumentary: No rash or pruritis  Neurological: No TIA or stroke symptoms  Psychiatric: No anxiety or depression  Endocrine: No malaise, fatigue or temperature intolerance  Hematologic/Lymphatic: No bleeding problems, blood clots or swollen lymph nodes  Allergic/Immunologic: No nasal congestion or hives    Objective:      Physical Exam:  BP (!) 122/50   Pulse 90   Resp 16   Ht 6' 4\" (1.93 m)   Wt 214 lb (97.1 kg)   SpO2 95%   BMI 26.05 kg/m²   Wt Readings from Last 3 Encounters:   08/22/22 214 lb (97.1 kg)   03/22/22 200 lb 3.2 oz (90.8 kg)     Body mass index is 26.05 kg/m². Vitals:    08/22/22 1017   BP: (!) 122/50   Pulse: 90   Resp: 16   SpO2: 95%        General Appearance:  No distress, conversant  Constitutional:  Well developed, Well nourished, No acute distress, Non-toxic appearance. HENT:  Normocephalic, Atraumatic, Bilateral external ears normal, Oropharynx moist, No oral exudates, Nose normal. Neck- Normal range of motion, No tenderness, Supple, No stridor,no apical-carotid delay  Eyes:  PERRL, EOMI, Conjunctiva normal, No discharge. Respiratory:  Normal breath sounds, No respiratory distress, No wheezing, No chest tenderness. ,no use of accessory muscles, NO crackles  Cardiovascular: (PMI) apex non displaced,no lifts no thrills,S1 and S2 audible, No added heart sounds, 1+ankle edema, or volume overload, No evidence of JVD, No crackles   GI:  Bowel sounds normal, Soft, No tenderness, No masses, No gross visceromegaly   :  No costovertebral angle tenderness   Musculoskeletal:  1+ edema, no tenderness, no deformities.  Back- no tenderness  Integument:  Well hydrated, no rash   Lymphatic:  No lymphadenopathy noted   Neurologic:  Alert & oriented x 3, CN 2-12 normal, normal motor function, normal sensory function, no focal deficits noted   Psychiatric:  Speech and behavior appropriate       Medical decision making and Data review:  DATA:  No results found for: TROPONINT  BNP:  No results found for: PROBNP  PT/INR:  No results found for: PTINR  No results found for: LABA1C  No results found for: CHOL, TRIG, HDL, LDLCALC, LDLDIRECT  Lab Results   Component Value Date    ALT 7 (L) 03/21/2022    AST 15 03/21/2022     No results for input(s): WBC, HGB, HCT, MCV, PLT in the last 72 discussed that for the  prevention of ASCVD our  goal is aggressive risk modification. Patient is encouraged to exercise if they can , educated about  brisk walk for 30 minutes  at least 3 to 4 times a week if there are no physical limitations  Various goals were discussed and questions answered. Continue current medications. Appropriate prescriptions are addressed and refills ordered. Questions answered and patient verbalizes understanding. Call for any problems, questions, or concerns. Greater than 60 % of time spent counseling besides reviewing data and images     Continue all other medications of all above medical condition listed as is. Return in about 1 month (around 9/22/2022). Please note this report has been partially produced using speech recognition software and may contain errors related to that system including errors in grammar, punctuation, and spelling, as well as words and phrases that may be inappropriate. If there are any questions or concerns please feel free to contact the dictating provider for clarification.

## 2022-09-02 ENCOUNTER — ANTI-COAG VISIT (OUTPATIENT)
Dept: FAMILY MEDICINE CLINIC | Age: 87
End: 2022-09-02

## 2022-09-07 ENCOUNTER — HOSPITAL ENCOUNTER (OUTPATIENT)
Age: 87
Discharge: HOME | End: 2022-09-07
Payer: MEDICARE

## 2022-09-07 ENCOUNTER — PROCEDURE VISIT (OUTPATIENT)
Dept: CARDIOLOGY CLINIC | Age: 87
End: 2022-09-07
Payer: MEDICARE

## 2022-09-07 ENCOUNTER — HOSPITAL ENCOUNTER (OUTPATIENT)
Dept: GENERAL RADIOLOGY | Age: 87
Discharge: HOME | End: 2022-09-07
Payer: MEDICARE

## 2022-09-07 DIAGNOSIS — I10 HYPERTENSION, UNSPECIFIED TYPE: ICD-10-CM

## 2022-09-07 DIAGNOSIS — N18.31 STAGE 3A CHRONIC KIDNEY DISEASE (HCC): ICD-10-CM

## 2022-09-07 DIAGNOSIS — R06.02 SOB (SHORTNESS OF BREATH): ICD-10-CM

## 2022-09-07 DIAGNOSIS — I44.7 LBBB (LEFT BUNDLE BRANCH BLOCK): ICD-10-CM

## 2022-09-07 DIAGNOSIS — R06.02 SOB (SHORTNESS OF BREATH): Primary | ICD-10-CM

## 2022-09-07 DIAGNOSIS — I48.91 ATRIAL FIBRILLATION, UNSPECIFIED TYPE (HCC): ICD-10-CM

## 2022-09-07 DIAGNOSIS — I48.0 PAROXYSMAL ATRIAL FIBRILLATION (HCC): ICD-10-CM

## 2022-09-07 DIAGNOSIS — R07.2 PRECORDIAL PAIN: ICD-10-CM

## 2022-09-07 DIAGNOSIS — I50.33 ACUTE ON CHRONIC DIASTOLIC CONGESTIVE HEART FAILURE (HCC): ICD-10-CM

## 2022-09-07 LAB
LV EF: 28 %
LV EF: 29 %
LVEF MODALITY: NORMAL
LVEF MODALITY: NORMAL
PRO-BNP: 3487 PG/ML

## 2022-09-07 PROCEDURE — 93017 CV STRESS TEST TRACING ONLY: CPT | Performed by: INTERNAL MEDICINE

## 2022-09-07 PROCEDURE — 71046 X-RAY EXAM CHEST 2 VIEWS: CPT

## 2022-09-07 PROCEDURE — 83880 ASSAY OF NATRIURETIC PEPTIDE: CPT

## 2022-09-07 PROCEDURE — 93306 TTE W/DOPPLER COMPLETE: CPT | Performed by: INTERNAL MEDICINE

## 2022-09-07 PROCEDURE — 93018 CV STRESS TEST I&R ONLY: CPT | Performed by: INTERNAL MEDICINE

## 2022-09-07 PROCEDURE — 93016 CV STRESS TEST SUPVJ ONLY: CPT | Performed by: INTERNAL MEDICINE

## 2022-09-07 PROCEDURE — 78452 HT MUSCLE IMAGE SPECT MULT: CPT | Performed by: INTERNAL MEDICINE

## 2022-09-07 PROCEDURE — A9500 TC99M SESTAMIBI: HCPCS | Performed by: INTERNAL MEDICINE

## 2022-09-07 PROCEDURE — 36415 COLL VENOUS BLD VENIPUNCTURE: CPT

## 2022-09-08 ENCOUNTER — TELEPHONE (OUTPATIENT)
Dept: CARDIOLOGY CLINIC | Age: 87
End: 2022-09-08

## 2022-09-08 NOTE — TELEPHONE ENCOUNTER
Summary   Left ventricular function is severely abnormal , EF is estimated at 25-30%. Dilated left ventricle noted. Moderate left ventricular hypertrophy. Grade II diastolic dysfunction. Abnormal (paradoxical) motion consistent with left bundle branch block. Global hypokinesis noted. Moderately dilated left atrium. Mild mitral regurgitation is present. No evidence of pericardial effusion. Summary  Supervising physician Dr. Hanna Salas .   Dilated left ventricle with EDV of 191 ML  Global hypokinesis with reduced EF of 29 %  Severe defect of medium size of apical wall , Septal defect of medium size  No ischemia , Abnormal stress images , ischemic cardiomyopathy  Abnormal stress test       Spoke with daughter, verbally understood to keep appt to discuss results

## 2022-09-27 ENCOUNTER — OFFICE VISIT (OUTPATIENT)
Dept: CARDIOLOGY CLINIC | Age: 87
End: 2022-09-27
Payer: MEDICARE

## 2022-09-27 VITALS
RESPIRATION RATE: 18 BRPM | SYSTOLIC BLOOD PRESSURE: 120 MMHG | WEIGHT: 214 LBS | DIASTOLIC BLOOD PRESSURE: 68 MMHG | HEIGHT: 76 IN | HEART RATE: 72 BPM | BODY MASS INDEX: 26.06 KG/M2

## 2022-09-27 DIAGNOSIS — I44.7 LBBB (LEFT BUNDLE BRANCH BLOCK): Primary | ICD-10-CM

## 2022-09-27 DIAGNOSIS — N18.4 CKD (CHRONIC KIDNEY DISEASE) STAGE 4, GFR 15-29 ML/MIN (HCC): ICD-10-CM

## 2022-09-27 DIAGNOSIS — N18.31 STAGE 3A CHRONIC KIDNEY DISEASE (HCC): ICD-10-CM

## 2022-09-27 DIAGNOSIS — I25.5 ISCHEMIC CARDIOMYOPATHY: ICD-10-CM

## 2022-09-27 DIAGNOSIS — I48.20 CHRONIC ATRIAL FIBRILLATION (HCC): ICD-10-CM

## 2022-09-27 DIAGNOSIS — R06.02 SOB (SHORTNESS OF BREATH): ICD-10-CM

## 2022-09-27 DIAGNOSIS — I48.0 PAROXYSMAL ATRIAL FIBRILLATION (HCC): ICD-10-CM

## 2022-09-27 DIAGNOSIS — R07.2 PRECORDIAL PAIN: ICD-10-CM

## 2022-09-27 DIAGNOSIS — I50.22 CHRONIC SYSTOLIC (CONGESTIVE) HEART FAILURE (HCC): ICD-10-CM

## 2022-09-27 DIAGNOSIS — I50.33 ACUTE ON CHRONIC DIASTOLIC CONGESTIVE HEART FAILURE (HCC): ICD-10-CM

## 2022-09-27 PROCEDURE — 1123F ACP DISCUSS/DSCN MKR DOCD: CPT | Performed by: INTERNAL MEDICINE

## 2022-09-27 PROCEDURE — G8427 DOCREV CUR MEDS BY ELIG CLIN: HCPCS | Performed by: INTERNAL MEDICINE

## 2022-09-27 PROCEDURE — 1036F TOBACCO NON-USER: CPT | Performed by: INTERNAL MEDICINE

## 2022-09-27 PROCEDURE — G8417 CALC BMI ABV UP PARAM F/U: HCPCS | Performed by: INTERNAL MEDICINE

## 2022-09-27 PROCEDURE — 99214 OFFICE O/P EST MOD 30 MIN: CPT | Performed by: INTERNAL MEDICINE

## 2022-09-27 NOTE — PROGRESS NOTES
CARDIOLOGY  NOTE    Chief Complaint: SOB/ afib/ LBBB    HPI:   Vanessa Payan is a 80y.o. year old who has Past medical history as noted below. Very pleasant gentleman comes in with his daughter and son-in-law he is just moved to [facility] from [place]. He is wheelchair-bound mostly because he is legally blind. He is here for further discussion due to bilateral lower extremity swelling and concerns for congestive heart failure. Reports that he has been Sleeping in lounge chair, once in awhile gest chets pain, belching helps ever since he started Lasix his symptoms of gotten little better and ankle swelling is improved  His echo shows severe daily reduced EF of about 25% with wall motion abnormality stress test shows large anterior septal defect with no ischemia   EKG shows left bundle branch block. Hehas history of A. fib he is on Coumadin but really had echo and stress test in [place] sometime back and used to see cardiology over there he is here for further discussion and establishing care. Have history of chronic kidney disease with creatinine 2.2 range.   Is 122 x 50 he denies feeling lightheaded or dizzy for now continue taking  Moved here from [place] started seeing Dr. John Rivera recently    Current Outpatient Medications   Medication Sig Dispense Refill    Oxygen Concentrator       Omega-3 Fatty Acids (OMEGA-3 FISH OIL) 1000 MG CAPS Take by mouth      diphenhydrAMINE HCl (BENADRYL ALLERGY PO) Take by mouth 25 mg 1 AM, 2 QPM      furosemide (LASIX) 40 MG tablet Take 1 tablet by mouth every other day 60 tablet 3    furosemide (LASIX) 40 MG tablet Take 1 tablet by mouth every other day 90 tablet 1    carvedilol (COREG) 6.25 MG tablet Take 1 tablet by mouth 2 times daily (with meals) 60 tablet 0    hydrALAZINE (APRESOLINE) 25 MG tablet Take 1 tablet by mouth 3 times daily 90 tablet 0    amLODIPine (NORVASC) 10 MG tablet TAKE 1 TABLET BY MOUTH EVERY DAY 30 tablet 0    terazosin (HYTRIN) 5 MG capsule Take 1 capsule by mouth nightly 30 capsule 3    warfarin (COUMADIN) 5 MG tablet Take 1 tablet by mouth daily 30 tablet 3    Multiple Vitamins-Minerals (ICAPS AREDS 2 PO) Take by mouth      isosorbide dinitrate (ISORDIL) 30 MG tablet Take 1 tablet by mouth 4 times daily (Patient taking differently: Take 30 mg by mouth daily PATIENT ONLY TAKING 1 TABLET PO ONCE DAILY) 120 tablet 2    fluticasone (FLOVENT HFA) 110 MCG/ACT inhaler Inhale 1 puff into the lungs 2 times daily 12 g 1    albuterol sulfate HFA (PROAIR HFA) 108 (90 Base) MCG/ACT inhaler Inhale 2 puffs into the lungs every 6 hours as needed for Wheezing or Shortness of Breath 18 g 0    potassium chloride (KLOR-CON M) 20 MEQ extended release tablet Take 1 tablet by mouth daily 180 tablet 1     No current facility-administered medications for this visit. Allergies:   Patient has no known allergies. Patient History:  No past medical history on file. No past surgical history on file. No family history on file. Social History     Tobacco Use    Smoking status: Never    Smokeless tobacco: Never   Substance Use Topics    Alcohol use: Never        Review of Systems:   Constitutional: No Fever or Weight Loss   Eyes: legally blind   ENT: No Headaches, Hearing Loss or Vertigo  Cardiovascular: as per note above   Respiratory: No cough or wheezing and as per note above.    Gastrointestinal: No abdominal pain, appetite loss, blood in stools, constipation, diarrhea or heartburn  Genitourinary: No dysuria, trouble voiding, or hematuria  Musculoskeletal:  denies any new  joint aches , swelling  or pain   Integumentary: No rash or pruritis  Neurological: No TIA or stroke symptoms  Psychiatric: No anxiety or depression  Endocrine: No malaise, fatigue or temperature intolerance  Hematologic/Lymphatic: No bleeding problems, blood clots or swollen lymph nodes  Allergic/Immunologic: No nasal congestion or hives    Objective:      Physical Exam:  BP 120/68 (Site: Left Upper Arm, Position: Sitting, Cuff Size: Medium Adult)   Pulse 72   Resp 18   Ht 6' 4\" (1.93 m)   Wt 214 lb (97.1 kg)   BMI 26.05 kg/m²   Wt Readings from Last 3 Encounters:   09/27/22 214 lb (97.1 kg)   08/22/22 214 lb (97.1 kg)   03/22/22 200 lb 3.2 oz (90.8 kg)     Body mass index is 26.05 kg/m². Vitals:    09/27/22 1232   BP: 120/68   Pulse: 72   Resp: 18        General Appearance:  No distress, conversant  Constitutional:  Well developed, Well nourished, No acute distress, Non-toxic appearance. HENT:  Normocephalic, Atraumatic, Bilateral external ears normal, Oropharynx moist, No oral exudates, Nose normal. Neck- Normal range of motion, No tenderness, Supple, No stridor,no apical-carotid delay  Eyes:  PERRL, EOMI, Conjunctiva normal, No discharge. Respiratory:  Normal breath sounds, No respiratory distress, No wheezing, No chest tenderness. ,no use of accessory muscles, NO crackles  Cardiovascular: (PMI) apex non displaced,no lifts no thrills,S1 and S2 audible, No added heart sounds, 1+ankle edema, or volume overload, No evidence of JVD, No crackles   GI:  Bowel sounds normal, Soft, No tenderness, No masses, No gross visceromegaly   :  No costovertebral angle tenderness   Musculoskeletal:  1+ edema, no tenderness, no deformities.  Back- no tenderness  Integument:  Well hydrated, no rash   Lymphatic:  No lymphadenopathy noted   Neurologic:  Alert & oriented x 3, CN 2-12 normal, normal motor function, normal sensory function, no focal deficits noted   Psychiatric:  Speech and behavior appropriate       Medical decision making and Data review:  DATA:  No results found for: TROPONINT  BNP:    Lab Results   Component Value Date    PROBNP 3,487 (H) 09/07/2022     PT/INR:  No results found for: PTINR  No results found for: LABA1C  No results found for: CHOL, TRIG, HDL, LDLCALC, LDLDIRECT  Lab Results   Component Value Date    ALT 7 (L) 03/21/2022    AST 15 03/21/2022     No results for input(s): WBC, HGB, HCT, MCV, PLT in the last 72 hours. TSH: No results found for: TSH  Lab Results   Component Value Date    AST 15 03/21/2022    ALT 7 (L) 03/21/2022    BILITOT 0.5 03/21/2022    ALKPHOS 87 03/21/2022     Lab Results   Component Value Date    PROBNP 3,487 (H) 09/07/2022     No results found for: LABA1C  No results found for: WBC, HGB, HCT, PLT      Echo 9/7/2022     Left ventricular function is severely abnormal , EF is estimated at 25-30%. Dilated left ventricle noted. Moderate left ventricular hypertrophy. Grade II diastolic dysfunction. Abnormal (paradoxical) motion consistent with left bundle branch block. Global hypokinesis noted. Moderately dilated left atrium. Mild mitral regurgitation is present. No evidence of pericardial effusion. Stress 9/7/22  Summary    Supervising physician Dr. Maynor Giles . Dilated left ventricle with EDV of 191 ML    Global hypokinesis with reduced EF of 29 %    Severe defect of medium size of apical wall , Septal defect of medium size    No ischemia , Abnormal stress images , ischemic cardiomyopathy    Abnormal stress test             All labs, medications and tests reviewed by myself including data and history from outside source , patient and available family . Assessment & Plan:      1. LBBB (left bundle branch block)    2. CKD (chronic kidney disease) stage 4, GFR 15-29 ml/min ([facility])    3. Chronic systolic (congestive) heart failure    4. Acute on chronic diastolic congestive heart failure (HCC)    5. Paroxysmal atrial fibrillation ([address]    6. SOB (shortness of breath)    7. Precordial pain    8. Stage 3a chronic kidney disease ([address]    9. Chronic atrial fibrillation (HCC)    10. Ischemic cardiomyopathy           CHF   He appears somewhat volume overloaded does have 1+ ankle edema increase Lasix to 40 twice daily on alternate days and 40 daily.   Take potassium extra when he takes extra Lasix  Echo shows EF of 25 to 30% titrate up carvedilol 6.25 twice daily since he is renal failure he is on Imdur 30 mg daily and hydralazine 25 mg 3 times daily not on ACE or ARB. Due to renal failure we will hold off on invasive work-up at this time . Stress showed fixed anterior infarct with no reversibility will debate ICD in few months depending on clinical course we will reevaluate in 1 month    Acute on chronic diastolic congestive heart failure (HCC)   Lasix to 40 twice daily on alternate days and 40 mg daily until we get more data. We had extensive discussion advised to cut down on salt intake    Paroxysmal atrial fibrillation (HCC)   EKG today shows left bundle branch block with PVCs and sinus rhythm. He is on Coumadin. Followed by PCP? LBBB (left bundle branch block)   We will get stress test to evaluate further    Hypertension   His blood pressure in the office todayCarvedilol 6.25 twice daily amlodipine 10 mg daily and he takes Hytrin 5 mg at night. Stage 3a chronic kidney disease ([facility]   [place] is 2.2 range continue Lasix for now     Dyslipidemia :  All available lab work was reviewed. Patient was advised to repeat lab work before next visit. Necessary orders were placed , instructions given by myself       Counseled extensively and medication compliance urged. We discussed that for the  prevention of ASCVD our  goal is aggressive risk modification. Patient is encouraged to exercise if they can , educated about  brisk walk for 30 minutes  at least 3 to 4 times a week if there are no physical limitations  Various goals were discussed and questions answered. Continue current medications. Appropriate prescriptions are addressed and refills ordered. Questions answered and patient verbalizes understanding. Call for any problems, questions, or concerns. Greater than 60 % of time spent counseling besides reviewing data and images     Continue all other medications of all above medical condition listed as is.     Return in about 1 month (around 10/27/2022). Please note this report has been partially produced using speech recognition software and may contain errors related to that system including errors in grammar, punctuation, and spelling, as well as words and phrases that may be inappropriate. If there are any questions or concerns please feel free to contact the dictating provider for clarification.

## 2022-09-30 ENCOUNTER — ANTI-COAG VISIT (OUTPATIENT)
Dept: FAMILY MEDICINE CLINIC | Age: 87
End: 2022-09-30

## 2022-09-30 PROBLEM — I48.20 CHRONIC ATRIAL FIBRILLATION (HCC): Status: RESOLVED | Noted: 2022-02-16 | Resolved: 2022-09-30

## 2022-10-17 ENCOUNTER — HOSPITAL ENCOUNTER (OUTPATIENT)
Age: 87
Discharge: HOME | End: 2022-10-17
Payer: MEDICARE

## 2022-10-17 DIAGNOSIS — N40.0 BENIGN PROSTATIC HYPERPLASIA WITHOUT LOWER URINARY TRACT SYMPTOMS: ICD-10-CM

## 2022-10-17 DIAGNOSIS — H54.8 LEGALLY BLIND: ICD-10-CM

## 2022-10-17 DIAGNOSIS — N18.4 CKD (CHRONIC KIDNEY DISEASE) STAGE 4, GFR 15-29 ML/MIN (HCC): ICD-10-CM

## 2022-10-17 DIAGNOSIS — I50.33 ACUTE ON CHRONIC DIASTOLIC CONGESTIVE HEART FAILURE (HCC): ICD-10-CM

## 2022-10-17 DIAGNOSIS — I10 HYPERTENSION, UNSPECIFIED TYPE: ICD-10-CM

## 2022-10-17 DIAGNOSIS — I48.0 PAROXYSMAL ATRIAL FIBRILLATION (HCC): ICD-10-CM

## 2022-10-17 LAB
ALBUMIN SERPL-MCNC: 3.9 GM/DL (ref 3.4–5)
ANION GAP SERPL CALCULATED.3IONS-SCNC: 11 MMOL/L (ref 4–16)
BASOPHILS ABSOLUTE: 0.1 K/CU MM
BASOPHILS RELATIVE PERCENT: 0.8 % (ref 0–1)
BUN BLDV-MCNC: 31 MG/DL (ref 6–23)
CALCIUM SERPL-MCNC: 7.9 MG/DL (ref 8.3–10.6)
CHLORIDE BLD-SCNC: 100 MMOL/L (ref 99–110)
CO2: 27 MMOL/L (ref 21–32)
CREAT SERPL-MCNC: 2.1 MG/DL (ref 0.9–1.3)
DIFFERENTIAL TYPE: ABNORMAL
EOSINOPHILS ABSOLUTE: 0.2 K/CU MM
EOSINOPHILS RELATIVE PERCENT: 2.1 % (ref 0–3)
GFR SERPL CREATININE-BSD FRML MDRD: 30 ML/MIN/1.73M2
GLUCOSE BLD-MCNC: 129 MG/DL (ref 70–99)
HCT VFR BLD CALC: 37.9 % (ref 42–52)
HEMOGLOBIN: 11.6 GM/DL (ref 13.5–18)
IMMATURE NEUTROPHIL %: 0.3 % (ref 0–0.43)
LYMPHOCYTES ABSOLUTE: 2.6 K/CU MM
LYMPHOCYTES RELATIVE PERCENT: 28.9 % (ref 24–44)
MCH RBC QN AUTO: 27.6 PG (ref 27–31)
MCHC RBC AUTO-ENTMCNC: 30.6 % (ref 32–36)
MCV RBC AUTO: 90.2 FL (ref 78–100)
MONOCYTES ABSOLUTE: 1.2 K/CU MM
MONOCYTES RELATIVE PERCENT: 12.9 % (ref 0–4)
NUCLEATED RBC %: 0 %
PDW BLD-RTO: 14.8 % (ref 11.7–14.9)
PHOSPHORUS: 3.2 MG/DL (ref 2.5–4.9)
PLATELET # BLD: 194 K/CU MM (ref 140–440)
PMV BLD AUTO: 9.9 FL (ref 7.5–11.1)
POTASSIUM SERPL-SCNC: 4.5 MMOL/L (ref 3.5–5.1)
RBC # BLD: 4.2 M/CU MM (ref 4.6–6.2)
SEGMENTED NEUTROPHILS ABSOLUTE COUNT: 4.9 K/CU MM
SEGMENTED NEUTROPHILS RELATIVE PERCENT: 55 % (ref 36–66)
SODIUM BLD-SCNC: 138 MMOL/L (ref 135–145)
TOTAL IMMATURE NEUTOROPHIL: 0.03 K/CU MM
TOTAL NUCLEATED RBC: 0 K/CU MM
WBC # BLD: 8.9 K/CU MM (ref 4–10.5)

## 2022-10-17 PROCEDURE — 36415 COLL VENOUS BLD VENIPUNCTURE: CPT

## 2022-10-17 PROCEDURE — 85025 COMPLETE CBC W/AUTO DIFF WBC: CPT

## 2022-10-17 PROCEDURE — 80048 BASIC METABOLIC PNL TOTAL CA: CPT

## 2022-10-17 PROCEDURE — 82040 ASSAY OF SERUM ALBUMIN: CPT

## 2022-10-17 PROCEDURE — 84100 ASSAY OF PHOSPHORUS: CPT

## 2022-10-19 ENCOUNTER — HOSPITAL ENCOUNTER (OUTPATIENT)
Age: 87
Setting detail: SPECIMEN
Discharge: HOME | End: 2022-10-19
Payer: MEDICARE

## 2022-10-19 LAB
BILIRUBIN URINE: NEGATIVE MG/DL
BLOOD, URINE: NEGATIVE
CLARITY: CLEAR
COLOR: YELLOW
CREATININE URINE: 48.8 MG/DL (ref 39–259)
GLUCOSE, URINE: NEGATIVE MG/DL
KETONES, URINE: NEGATIVE MG/DL
LEUKOCYTE ESTERASE, URINE: NEGATIVE
NITRITE URINE, QUANTITATIVE: NEGATIVE
PH, URINE: 7.5 (ref 5–8)
PROT/CREAT RATIO, UR: 0.2
PROTEIN UA: NEGATIVE MG/DL
SPECIFIC GRAVITY UA: 1.01 (ref 1–1.03)
URINE TOTAL PROTEIN: 10.1 MG/DL
UROBILINOGEN, URINE: 0.2 MG/DL (ref 0.2–1)

## 2022-10-19 PROCEDURE — 81003 URINALYSIS AUTO W/O SCOPE: CPT

## 2022-10-19 PROCEDURE — 84156 ASSAY OF PROTEIN URINE: CPT

## 2022-10-19 PROCEDURE — 82570 ASSAY OF URINE CREATININE: CPT

## 2022-11-17 PROBLEM — N18.32 STAGE 3B CHRONIC KIDNEY DISEASE (HCC): Status: ACTIVE | Noted: 2022-11-17

## 2022-11-17 PROBLEM — R35.0 URINARY FREQUENCY: Status: ACTIVE | Noted: 2022-11-17

## 2022-11-21 ENCOUNTER — OFFICE VISIT (OUTPATIENT)
Dept: CARDIOLOGY CLINIC | Age: 87
End: 2022-11-21
Payer: MEDICARE

## 2022-11-21 VITALS
HEART RATE: 88 BPM | HEIGHT: 77 IN | WEIGHT: 214 LBS | OXYGEN SATURATION: 92 % | BODY MASS INDEX: 25.27 KG/M2 | DIASTOLIC BLOOD PRESSURE: 64 MMHG | SYSTOLIC BLOOD PRESSURE: 118 MMHG

## 2022-11-21 DIAGNOSIS — N18.4 CKD (CHRONIC KIDNEY DISEASE) STAGE 4, GFR 15-29 ML/MIN (HCC): ICD-10-CM

## 2022-11-21 DIAGNOSIS — H54.8 LEGALLY BLIND: ICD-10-CM

## 2022-11-21 DIAGNOSIS — I44.7 LBBB (LEFT BUNDLE BRANCH BLOCK): ICD-10-CM

## 2022-11-21 DIAGNOSIS — I25.5 ISCHEMIC CARDIOMYOPATHY: ICD-10-CM

## 2022-11-21 DIAGNOSIS — R06.02 SOB (SHORTNESS OF BREATH): ICD-10-CM

## 2022-11-21 DIAGNOSIS — I48.0 PAROXYSMAL ATRIAL FIBRILLATION (HCC): ICD-10-CM

## 2022-11-21 DIAGNOSIS — I50.33 ACUTE ON CHRONIC DIASTOLIC CONGESTIVE HEART FAILURE (HCC): Primary | ICD-10-CM

## 2022-11-21 DIAGNOSIS — N18.31 STAGE 3A CHRONIC KIDNEY DISEASE (HCC): ICD-10-CM

## 2022-11-21 DIAGNOSIS — R07.2 PRECORDIAL PAIN: ICD-10-CM

## 2022-11-21 DIAGNOSIS — I50.22 CHRONIC SYSTOLIC (CONGESTIVE) HEART FAILURE (HCC): ICD-10-CM

## 2022-11-21 PROCEDURE — G8417 CALC BMI ABV UP PARAM F/U: HCPCS | Performed by: INTERNAL MEDICINE

## 2022-11-21 PROCEDURE — 1123F ACP DISCUSS/DSCN MKR DOCD: CPT | Performed by: INTERNAL MEDICINE

## 2022-11-21 PROCEDURE — 99214 OFFICE O/P EST MOD 30 MIN: CPT | Performed by: INTERNAL MEDICINE

## 2022-11-21 PROCEDURE — G8484 FLU IMMUNIZE NO ADMIN: HCPCS | Performed by: INTERNAL MEDICINE

## 2022-11-21 PROCEDURE — 1036F TOBACCO NON-USER: CPT | Performed by: INTERNAL MEDICINE

## 2022-11-21 PROCEDURE — G8427 DOCREV CUR MEDS BY ELIG CLIN: HCPCS | Performed by: INTERNAL MEDICINE

## 2022-11-21 RX ORDER — AMLODIPINE BESYLATE 5 MG/1
TABLET ORAL
Qty: 90 TABLET | Refills: 4 | Status: SHIPPED | OUTPATIENT
Start: 2022-11-21

## 2022-11-21 NOTE — PROGRESS NOTES
CARDIOLOGY  NOTE    Chief Complaint: SOB/ afib/ LBBB    HPI:   Dimitris Slaughter is a 80y.o. year old who has Past medical history as noted below. Very pleasant gentleman comes in with his daughter and son-in-law he is just moved to [facility] from [place]. He is wheelchair-bound mostly because he is legally blind. He is here for cardiomyopathy with vision fraction of 25% he did see nephrology as well currently not on ACE or an ARB because of creatinine of 2.1   Is alternating between 40 and 80 mg of Lasix that is helped a lot with his ankle swelling breathing is improved    Reports that he has been Sleeping in lounge chair, once in awhile gest chets pain, belching helps . His echo shows severe daily reduced EF of about 25% with wall motion abnormality stress test shows large anterior septal defect with no ischemia   EKG shows left bundle branch block. Hehas history of A. fib he is on Coumadin but really had echo and stress test in [place] sometime back and used to see cardiology over there he is here for further discussion and establishing care. Have history of chronic kidney disease with creatinine 2.2 range.     Current Outpatient Medications   Medication Sig Dispense Refill    sacubitril-valsartan (ENTRESTO) 24-26 MG per tablet Take 1 tablet by mouth 2 times daily 60 tablet 3    amLODIPine (NORVASC) 5 MG tablet TAKE 1 TABLET BY MOUTH EVERY DAY 90 tablet 4    Multiple Vitamins-Minerals (PRESERVISION AREDS 2 PO) Take 1 tablet by mouth daily      docusate sodium (COLACE) 100 MG capsule Take 100 mg by mouth daily      latanoprost (XALATAN) 0.005 % ophthalmic solution INSTILL 1 DROP IN RIGHT EYE AT BEDTIME      isosorbide dinitrate (ISORDIL) 30 MG tablet Take 30 mg by mouth daily      ondansetron (ZOFRAN) 4 MG tablet Take 4 mg by mouth as needed for Nausea or Vomiting      Oxygen Concentrator       Omega-3 Fatty Acids (OMEGA-3 FISH OIL) 1000 MG CAPS Take by mouth      diphenhydrAMINE HCl (BENADRYL ALLERGY PO) Take by mouth 25 mg 1 AM, 2 QPM      furosemide (LASIX) 40 MG tablet Take 1 tablet by mouth every other day 60 tablet 3    carvedilol (COREG) 6.25 MG tablet Take 1 tablet by mouth 2 times daily (with meals) 60 tablet 0    terazosin (HYTRIN) 5 MG capsule Take 1 capsule by mouth nightly 30 capsule 3    warfarin (COUMADIN) 5 MG tablet Take 1 tablet by mouth daily 30 tablet 3    potassium chloride (KLOR-CON M) 20 MEQ extended release tablet Take 1 tablet by mouth daily 180 tablet 1    hydrALAZINE (APRESOLINE) 25 MG tablet Take 1 tablet by mouth 3 times daily 90 tablet 0     No current facility-administered medications for this visit. Allergies:   Patient has no known allergies. Patient History:  Past Medical History:   Diagnosis Date    Chronic kidney disease     Heart abnormality     Hypertension     UTI (urinary tract infection)      No past surgical history on file. Family History   Problem Relation Age of Onset    Kidney Disease Mother     Hypertension Mother     Diabetes Mother     Diabetes Granddaughter     Hypertension Daughter     Cancer Daughter      Social History     Tobacco Use    Smoking status: Never    Smokeless tobacco: Never   Substance Use Topics    Alcohol use: Never        Review of Systems:   Constitutional: No Fever or Weight Loss   Eyes: legally blind   ENT: No Headaches, Hearing Loss or Vertigo  Cardiovascular: as per note above   Respiratory: No cough or wheezing and as per note above.    Gastrointestinal: No abdominal pain, appetite loss, blood in stools, constipation, diarrhea or heartburn  Genitourinary: No dysuria, trouble voiding, or hematuria  Musculoskeletal:  denies any new  joint aches , swelling  or pain   Integumentary: No rash or pruritis  Neurological: No TIA or stroke symptoms  Psychiatric: No anxiety or depression  Endocrine: No malaise, fatigue or temperature intolerance  Hematologic/Lymphatic: No bleeding problems, blood clots or swollen lymph nodes  Allergic/Immunologic: No nasal congestion or hives    Objective:      Physical Exam:  /64 (Site: Right Upper Arm, Position: Sitting, Cuff Size: Medium Adult)   Pulse 88   Ht 6' 5\" (1.956 m)   Wt 214 lb (97.1 kg)   SpO2 92%   BMI 25.38 kg/m²   Wt Readings from Last 3 Encounters:   11/21/22 214 lb (97.1 kg)   10/04/22 214 lb (97.1 kg)   09/27/22 214 lb (97.1 kg)     Body mass index is 25.38 kg/m². Vitals:    11/21/22 1347   BP: 118/64   Pulse: 88   SpO2: 92%        General Appearance:  No distress, conversant  Constitutional:  Well developed, Well nourished, No acute distress, Non-toxic appearance. HENT:  Normocephalic, Atraumatic, Bilateral external ears normal, Oropharynx moist, No oral exudates, Nose normal. Neck- Normal range of motion, No tenderness, Supple, No stridor,no apical-carotid delay  Eyes:  PERRL, EOMI, Conjunctiva normal, No discharge. Respiratory:  Normal breath sounds, No respiratory distress, No wheezing, No chest tenderness. ,no use of accessory muscles, NO crackles  Cardiovascular: (PMI) apex non displaced,no lifts no thrills,S1 and S2 audible, No added heart sounds, 1+ankle edema, or volume overload, No evidence of JVD, No crackles   GI:  Bowel sounds normal, Soft, No tenderness, No masses, No gross visceromegaly   :  No costovertebral angle tenderness   Musculoskeletal:  1+ edema, no tenderness, no deformities.  Back- no tenderness  Integument:  Well hydrated, no rash   Lymphatic:  No lymphadenopathy noted   Neurologic:  Alert & oriented x 3, CN 2-12 normal, normal motor function, normal sensory function, no focal deficits noted   Psychiatric:  Speech and behavior appropriate       Medical decision making and Data review:  DATA:  No results found for: TROPONINT  BNP:    Lab Results   Component Value Date    PROBNP 3,487 (H) 09/07/2022     PT/INR:  No results found for: PTINR  No results found for: LABA1C  No results found for: CHOL, TRIG, HDL, LDLCALC, LDLDIRECT  Lab Results   Component Value Date    ALT 7 (L) 03/21/2022    AST 15 03/21/2022     No results for input(s): WBC, HGB, HCT, MCV, PLT in the last 72 hours. TSH: No results found for: TSH  Lab Results   Component Value Date    AST 15 03/21/2022    ALT 7 (L) 03/21/2022    BILITOT 0.5 03/21/2022    ALKPHOS 87 03/21/2022     Lab Results   Component Value Date    PROBNP 3,487 (H) 09/07/2022     No results found for: LABA1C  Lab Results   Component Value Date    WBC 8.9 10/17/2022    HGB 11.6 (L) 10/17/2022    HCT 37.9 (L) 10/17/2022     10/17/2022         Echo 9/7/2022     Left ventricular function is severely abnormal , EF is estimated at 25-30%. Dilated left ventricle noted. Moderate left ventricular hypertrophy. Grade II diastolic dysfunction. Abnormal (paradoxical) motion consistent with left bundle branch block. Global hypokinesis noted. Moderately dilated left atrium. Mild mitral regurgitation is present. No evidence of pericardial effusion. Stress 9/7/22  Summary    Supervising physician Dr. Lucia Mathias . Dilated left ventricle with EDV of 191 ML    Global hypokinesis with reduced EF of 29 %    Severe defect of medium size of apical wall , Septal defect of medium size    No ischemia , Abnormal stress images , ischemic cardiomyopathy    Abnormal stress test             All labs, medications and tests reviewed by myself including data and history from outside source , patient and available family . Assessment & Plan:      1. Acute on chronic diastolic congestive heart failure ([address]    2. Chronic systolic (congestive) heart failure    3. CKD (chronic kidney disease) stage 4, GFR 15-29 ml/min ([facility])    4. Ischemic cardiomyopathy    5. LBBB (left bundle branch block)    6. Legally blind    7. Paroxysmal atrial fibrillation (HCC)    8. Precordial pain    9. SOB (shortness of breath)    10.  Stage 3a chronic kidney disease (HCC)             CHF  Shortness of breath volume status with ankle swelling is better on Lasix to 40 twice daily on alternate days and 40 daily treatment. Take potassium extra when he takes extra Lasix  Echo shows EF of 25 to 30% titrate up carvedilol 6.25 twice daily since he is renal failure he is on Imdur 30 mg daily and hydralazine 25 mg 3 times daily not on ACE or ARB. Due to renal failure we will hold off on invasive work-up at this time . Stress showed fixed anterior infarct with no reversibility will debate ICD in few months depending on clinical course . We talked about doing a cardiac catheterization they want to hold off and try medication first  I discussed with nephrology they have cleared him I would like to start Entresto 24 to 26 mg twice daily in anticipation of blood pressure drop cut down on amlodipine to 5 mg daily  Check labs in about 1 week watch potassium levels    Acute on chronic diastolic congestive heart failure (HCC)   Lasix to 40 twice daily on alternate days and 40 mg daily until we get more data. We had extensive discussion advised to cut down on salt intake    Paroxysmal atrial fibrillation (HCC)   EKG shows left bundle branch block with PVCs and sinus rhythm. He is on Coumadin. Followed by PCP? LBBB (left bundle branch block)  Eventually will plan cardiac catheterization may need ICD debate by March or April 2023 titrate medication monthly till then    Hypertension   His blood pressure in the office todayCarvedilol 6.25 twice daily . down on amlodipine to 5 mg daily in anticipation of starting Entresto and he takes Hytrin 5 mg at night. Stage 3a chronic kidney disease (HCC)  Creatinine is 2.2 range continue Lasix for now     Dyslipidemia :  All available lab work was reviewed. Patient was advised to repeat lab work before next visit. Necessary orders were placed , instructions given by myself       Counseled extensively and medication compliance urged. We discussed that for the  prevention of ASCVD our  goal is aggressive risk modification. Patient is encouraged to exercise if they can , educated about  brisk walk for 30 minutes  at least 3 to 4 times a week if there are no physical limitations  Various goals were discussed and questions answered. Continue current medications. Appropriate prescriptions are addressed and refills ordered. Questions answered and patient verbalizes understanding. Call for any problems, questions, or concerns. Greater than 60 % of time spent counseling besides reviewing data and images     Continue all other medications of all above medical condition listed as is. Return in about 1 month (around 12/21/2022). Please note this report has been partially produced using speech recognition software and may contain errors related to that system including errors in grammar, punctuation, and spelling, as well as words and phrases that may be inappropriate. If there are any questions or concerns please feel free to contact the dictating provider for clarification.

## 2022-11-22 ENCOUNTER — TELEPHONE (OUTPATIENT)
Dept: CARDIOLOGY CLINIC | Age: 87
End: 2022-11-22

## 2022-11-22 NOTE — TELEPHONE ENCOUNTER
Pt's daughter called about the RX for [facility] being @$400 wants to know if there are savings or substitute available.  Please advise

## 2022-12-02 ENCOUNTER — TELEPHONE (OUTPATIENT)
Dept: CARDIOLOGY CLINIC | Age: 87
End: 2022-12-02

## 2022-12-02 NOTE — TELEPHONE ENCOUNTER
Patient came from [facility] and [facility] doctor wrote script for oxygen. Dr Shay Gardner told him he needed oxygen 24/7 due to low EF. They need new script for oxygen and would like Dr Rufus Cagle to do script. PCP did test at night and he went below 88% for 5 min.   Call [place] [phone]

## 2022-12-02 NOTE — TELEPHONE ENCOUNTER
Spoke with yeni, PCP has done testing for oxygen and patient did not qualify.  Advised that our office does not order oxygen

## 2023-01-13 ENCOUNTER — HOSPITAL ENCOUNTER (OUTPATIENT)
Age: 88
Discharge: HOME | End: 2023-01-13
Payer: MEDICARE

## 2023-01-13 LAB
ALBUMIN SERPL-MCNC: 3.8 GM/DL (ref 3.4–5)
ANION GAP SERPL CALCULATED.3IONS-SCNC: 10 MMOL/L (ref 4–16)
BASOPHILS ABSOLUTE: 0.1 K/CU MM
BASOPHILS RELATIVE PERCENT: 0.6 % (ref 0–1)
BUN BLDV-MCNC: 38 MG/DL (ref 6–23)
CALCIUM SERPL-MCNC: 7.9 MG/DL (ref 8.3–10.6)
CHLORIDE BLD-SCNC: 103 MMOL/L (ref 99–110)
CO2: 29 MMOL/L (ref 21–32)
CREAT SERPL-MCNC: 2.3 MG/DL (ref 0.9–1.3)
DIFFERENTIAL TYPE: ABNORMAL
EOSINOPHILS ABSOLUTE: 0.3 K/CU MM
EOSINOPHILS RELATIVE PERCENT: 3.2 % (ref 0–3)
GFR SERPL CREATININE-BSD FRML MDRD: 27 ML/MIN/1.73M2
GLUCOSE BLD-MCNC: 122 MG/DL (ref 70–99)
HCT VFR BLD CALC: 37.6 % (ref 42–52)
HEMOGLOBIN: 11.3 GM/DL (ref 13.5–18)
IMMATURE NEUTROPHIL %: 0.2 % (ref 0–0.43)
LYMPHOCYTES ABSOLUTE: 2.3 K/CU MM
LYMPHOCYTES RELATIVE PERCENT: 27.2 % (ref 24–44)
MCH RBC QN AUTO: 27.6 PG (ref 27–31)
MCHC RBC AUTO-ENTMCNC: 30.1 % (ref 32–36)
MCV RBC AUTO: 91.9 FL (ref 78–100)
MONOCYTES ABSOLUTE: 0.9 K/CU MM
MONOCYTES RELATIVE PERCENT: 10.8 % (ref 0–4)
NUCLEATED RBC %: 0 %
PDW BLD-RTO: 14.9 % (ref 11.7–14.9)
PHOSPHORUS: 3 MG/DL (ref 2.5–4.9)
PLATELET # BLD: 186 K/CU MM (ref 140–440)
PMV BLD AUTO: 10.2 FL (ref 7.5–11.1)
POTASSIUM SERPL-SCNC: 4.9 MMOL/L (ref 3.5–5.1)
RBC # BLD: 4.09 M/CU MM (ref 4.6–6.2)
SEGMENTED NEUTROPHILS ABSOLUTE COUNT: 4.9 K/CU MM
SEGMENTED NEUTROPHILS RELATIVE PERCENT: 58 % (ref 36–66)
SODIUM BLD-SCNC: 142 MMOL/L (ref 135–145)
TOTAL IMMATURE NEUTOROPHIL: 0.02 K/CU MM
TOTAL NUCLEATED RBC: 0 K/CU MM
WBC # BLD: 8.5 K/CU MM (ref 4–10.5)

## 2023-01-13 PROCEDURE — 80048 BASIC METABOLIC PNL TOTAL CA: CPT

## 2023-01-13 PROCEDURE — 82570 ASSAY OF URINE CREATININE: CPT

## 2023-01-13 PROCEDURE — 82040 ASSAY OF SERUM ALBUMIN: CPT

## 2023-01-13 PROCEDURE — 84100 ASSAY OF PHOSPHORUS: CPT

## 2023-01-13 PROCEDURE — 81001 URINALYSIS AUTO W/SCOPE: CPT

## 2023-01-13 PROCEDURE — 84156 ASSAY OF PROTEIN URINE: CPT

## 2023-01-13 PROCEDURE — 85025 COMPLETE CBC W/AUTO DIFF WBC: CPT

## 2023-01-13 PROCEDURE — 36415 COLL VENOUS BLD VENIPUNCTURE: CPT

## 2023-01-14 LAB
CREATININE URINE: 54.8 MG/DL (ref 39–259)
PROT/CREAT RATIO, UR: 0.1
URINE TOTAL PROTEIN: 5.5 MG/DL

## 2023-01-16 ENCOUNTER — OFFICE VISIT (OUTPATIENT)
Dept: CARDIOLOGY CLINIC | Age: 88
End: 2023-01-16
Payer: MEDICARE

## 2023-01-16 VITALS
DIASTOLIC BLOOD PRESSURE: 64 MMHG | HEART RATE: 89 BPM | BODY MASS INDEX: 26.06 KG/M2 | OXYGEN SATURATION: 92 % | HEIGHT: 76 IN | SYSTOLIC BLOOD PRESSURE: 118 MMHG | WEIGHT: 214 LBS

## 2023-01-16 DIAGNOSIS — R07.2 PRECORDIAL PAIN: ICD-10-CM

## 2023-01-16 DIAGNOSIS — N18.4 CKD (CHRONIC KIDNEY DISEASE) STAGE 4, GFR 15-29 ML/MIN (HCC): ICD-10-CM

## 2023-01-16 DIAGNOSIS — I25.5 ISCHEMIC CARDIOMYOPATHY: ICD-10-CM

## 2023-01-16 DIAGNOSIS — I50.22 CHRONIC SYSTOLIC (CONGESTIVE) HEART FAILURE (HCC): ICD-10-CM

## 2023-01-16 DIAGNOSIS — I50.33 ACUTE ON CHRONIC DIASTOLIC CONGESTIVE HEART FAILURE (HCC): Primary | ICD-10-CM

## 2023-01-16 DIAGNOSIS — I44.7 LBBB (LEFT BUNDLE BRANCH BLOCK): ICD-10-CM

## 2023-01-16 DIAGNOSIS — I48.0 PAROXYSMAL ATRIAL FIBRILLATION (HCC): ICD-10-CM

## 2023-01-16 DIAGNOSIS — N18.31 STAGE 3A CHRONIC KIDNEY DISEASE (HCC): ICD-10-CM

## 2023-01-16 DIAGNOSIS — I10 HYPERTENSION, UNSPECIFIED TYPE: ICD-10-CM

## 2023-01-16 PROCEDURE — 1123F ACP DISCUSS/DSCN MKR DOCD: CPT | Performed by: INTERNAL MEDICINE

## 2023-01-16 PROCEDURE — 1036F TOBACCO NON-USER: CPT | Performed by: INTERNAL MEDICINE

## 2023-01-16 PROCEDURE — 99214 OFFICE O/P EST MOD 30 MIN: CPT | Performed by: INTERNAL MEDICINE

## 2023-01-16 PROCEDURE — G8427 DOCREV CUR MEDS BY ELIG CLIN: HCPCS | Performed by: INTERNAL MEDICINE

## 2023-01-16 PROCEDURE — G8484 FLU IMMUNIZE NO ADMIN: HCPCS | Performed by: INTERNAL MEDICINE

## 2023-01-16 PROCEDURE — G8417 CALC BMI ABV UP PARAM F/U: HCPCS | Performed by: INTERNAL MEDICINE

## 2023-01-16 RX ORDER — CARVEDILOL 12.5 MG/1
12.5 TABLET ORAL 2 TIMES DAILY WITH MEALS
Qty: 90 TABLET | Refills: 4 | Status: SHIPPED | OUTPATIENT
Start: 2023-01-16

## 2023-01-16 NOTE — PATIENT INSTRUCTIONS
Please be informed that if you contact our office outside of normal business hours the physician on call cannot help with any scheduling or rescheduling issues, procedure instruction questions or any type of medication issue. We advise you for any urgent/emergency that you go to the nearest emergency room! PLEASE CALL OUR OFFICE DURING NORMAL BUSINESS HOURS    Monday - Friday   8 am to 5 pm    [address] [address]: [phone]    [place]:  [phone]        **It is YOUR responsibilty to bring medication bottles and/or updated medication list to [address] [place] [address]. This will allow us to better serve you and all your healthcare needs**      Thank you for allowing us to care for you today! We want to ensure we can follow your treatment plan and we strive to give you the best outcomes and experience possible. If you ever have a life threatening emergency and call 911 - for an ambulance (EMS)   Our providers can only care for you at:   [facility] or [facility]. Even if you have someone take you or you drive yourself we can only care for you in a [id number] facility. Our providers are not setup at the other healthcare locations!

## 2023-01-31 ENCOUNTER — HOSPITAL ENCOUNTER (OUTPATIENT)
Dept: GENERAL RADIOLOGY | Age: 88
Discharge: HOME | End: 2023-01-31
Payer: MEDICARE

## 2023-01-31 ENCOUNTER — HOSPITAL ENCOUNTER (OUTPATIENT)
Age: 88
Discharge: HOME | End: 2023-01-31
Payer: MEDICARE

## 2023-01-31 DIAGNOSIS — R06.02 SOB (SHORTNESS OF BREATH): ICD-10-CM

## 2023-01-31 PROCEDURE — 71046 X-RAY EXAM CHEST 2 VIEWS: CPT

## 2023-02-22 RX ORDER — CARVEDILOL 12.5 MG/1
TABLET ORAL
Qty: 90 TABLET | Refills: 4 | OUTPATIENT
Start: 2023-02-22

## 2023-02-24 ENCOUNTER — APPOINTMENT (OUTPATIENT)
Dept: GENERAL RADIOLOGY | Age: 88
DRG: 871 | End: 2023-02-24
Payer: MEDICARE

## 2023-02-24 ENCOUNTER — HOSPITAL ENCOUNTER (INPATIENT)
Age: 88
LOS: 5 days | Discharge: NURSING, REHAB OR LONG-TERM CARE | DRG: 871 | End: 2023-03-01
Attending: EMERGENCY MEDICINE | Admitting: STUDENT IN AN ORGANIZED HEALTH CARE EDUCATION/TRAINING PROGRAM
Payer: MEDICARE

## 2023-02-24 ENCOUNTER — APPOINTMENT (OUTPATIENT)
Dept: CT IMAGING | Age: 88
DRG: 871 | End: 2023-02-24
Payer: MEDICARE

## 2023-02-24 DIAGNOSIS — N30.01 ACUTE CYSTITIS WITH HEMATURIA: ICD-10-CM

## 2023-02-24 DIAGNOSIS — R77.8 ELEVATED TROPONIN: ICD-10-CM

## 2023-02-24 DIAGNOSIS — R41.0 DELIRIUM: Primary | ICD-10-CM

## 2023-02-24 DIAGNOSIS — R11.2 NAUSEA AND VOMITING, UNSPECIFIED VOMITING TYPE: ICD-10-CM

## 2023-02-24 PROBLEM — A41.9 SEVERE SEPSIS (HCC): Status: ACTIVE | Noted: 2023-02-24

## 2023-02-24 PROBLEM — R65.20 SEVERE SEPSIS (HCC): Status: ACTIVE | Noted: 2023-02-24

## 2023-02-24 LAB
ALBUMIN SERPL-MCNC: 3.9 GM/DL (ref 3.4–5)
ALP BLD-CCNC: 72 IU/L (ref 40–129)
ALT SERPL-CCNC: 6 U/L (ref 10–40)
ANION GAP SERPL CALCULATED.3IONS-SCNC: 11 MMOL/L (ref 4–16)
AST SERPL-CCNC: 18 IU/L (ref 15–37)
BACTERIA: ABNORMAL /HPF
BASE EXCESS MIXED: 5.3 (ref 0–1.2)
BASOPHILS ABSOLUTE: 0.1 K/CU MM
BASOPHILS RELATIVE PERCENT: 0.2 % (ref 0–1)
BILIRUB SERPL-MCNC: 1.3 MG/DL (ref 0–1)
BILIRUBIN URINE: NEGATIVE MG/DL
BLOOD, URINE: ABNORMAL
BUN SERPL-MCNC: 39 MG/DL (ref 6–23)
CALCIUM SERPL-MCNC: 7.5 MG/DL (ref 8.3–10.6)
CHLORIDE BLD-SCNC: 98 MMOL/L (ref 99–110)
CLARITY: ABNORMAL
CO2: 29 MMOL/L (ref 21–32)
COLOR: YELLOW
COMMENT: ABNORMAL
CREAT SERPL-MCNC: 2.5 MG/DL (ref 0.9–1.3)
DIFFERENTIAL TYPE: ABNORMAL
EKG ATRIAL RATE: 93 BPM
EKG DIAGNOSIS: NORMAL
EKG P AXIS: 90 DEGREES
EKG P-R INTERVAL: 198 MS
EKG Q-T INTERVAL: 416 MS
EKG QRS DURATION: 164 MS
EKG QTC CALCULATION (BAZETT): 517 MS
EKG R AXIS: -31 DEGREES
EKG T AXIS: 183 DEGREES
EKG VENTRICULAR RATE: 93 BPM
EOSINOPHILS ABSOLUTE: 0 K/CU MM
EOSINOPHILS RELATIVE PERCENT: 0 % (ref 0–3)
GFR SERPL CREATININE-BSD FRML MDRD: 24 ML/MIN/1.73M2
GLUCOSE SERPL-MCNC: 120 MG/DL (ref 70–99)
GLUCOSE, URINE: NEGATIVE MG/DL
HCO3 VENOUS: 31.1 MMOL/L (ref 19–25)
HCT VFR BLD CALC: 37.1 % (ref 42–52)
HEMOGLOBIN: 11.4 GM/DL (ref 13.5–18)
IMMATURE NEUTROPHIL %: 1.6 % (ref 0–0.43)
INR BLD: 1.59 INDEX
KETONES, URINE: NEGATIVE MG/DL
LACTIC ACID, SEPSIS: 1.6 MMOL/L (ref 0.5–1.9)
LEUKOCYTE ESTERASE, URINE: ABNORMAL
LYMPHOCYTES ABSOLUTE: 1.5 K/CU MM
LYMPHOCYTES RELATIVE PERCENT: 6.2 % (ref 24–44)
MCH RBC QN AUTO: 28.4 PG (ref 27–31)
MCHC RBC AUTO-ENTMCNC: 30.7 % (ref 32–36)
MCV RBC AUTO: 92.3 FL (ref 78–100)
MONOCYTES ABSOLUTE: 2.5 K/CU MM
MONOCYTES RELATIVE PERCENT: 10.3 % (ref 0–4)
MUCUS: ABNORMAL HPF
NITRITE URINE, QUANTITATIVE: NEGATIVE
NUCLEATED RBC %: 0 %
O2 SAT, VEN: 94.8 % (ref 50–70)
PCO2, VEN: 49 MMHG (ref 38–52)
PDW BLD-RTO: 14.7 % (ref 11.7–14.9)
PH VENOUS: 7.41 (ref 7.32–7.42)
PH, URINE: 6.5 (ref 5–8)
PLATELET # BLD: 138 K/CU MM (ref 140–440)
PMV BLD AUTO: 10 FL (ref 7.5–11.1)
PO2, VEN: 179 MMHG (ref 28–48)
POTASSIUM SERPL-SCNC: 4.7 MMOL/L (ref 3.5–5.1)
PROTEIN UA: ABNORMAL MG/DL
PROTHROMBIN TIME: 20.6 SECONDS (ref 11.7–14.5)
RBC # BLD: 4.02 M/CU MM (ref 4.6–6.2)
RBC URINE: 13 /HPF (ref 0–3)
SEGMENTED NEUTROPHILS ABSOLUTE COUNT: 19.8 K/CU MM
SEGMENTED NEUTROPHILS RELATIVE PERCENT: 81.7 % (ref 36–66)
SODIUM BLD-SCNC: 138 MMOL/L (ref 135–145)
SPECIFIC GRAVITY UA: <1.005 (ref 1–1.03)
SQUAMOUS EPITHELIAL: 1 /HPF
TOTAL IMMATURE NEUTOROPHIL: 0.39 K/CU MM
TOTAL NUCLEATED RBC: 0 K/CU MM
TOTAL PROTEIN: 6.5 GM/DL (ref 6.4–8.2)
TRICHOMONAS: ABNORMAL /HPF
TROPONIN T: 0.02 NG/ML
TROPONIN T: 0.02 NG/ML
UROBILINOGEN, URINE: 1 MG/DL (ref 0.2–1)
WBC # BLD: 24.3 K/CU MM (ref 4–10.5)
WBC CLUMP: ABNORMAL /HPF
WBC UA: 270 /HPF (ref 0–2)

## 2023-02-24 PROCEDURE — 84484 ASSAY OF TROPONIN QUANT: CPT

## 2023-02-24 PROCEDURE — 2580000003 HC RX 258: Performed by: EMERGENCY MEDICINE

## 2023-02-24 PROCEDURE — 94761 N-INVAS EAR/PLS OXIMETRY MLT: CPT

## 2023-02-24 PROCEDURE — 93005 ELECTROCARDIOGRAM TRACING: CPT | Performed by: EMERGENCY MEDICINE

## 2023-02-24 PROCEDURE — 71045 X-RAY EXAM CHEST 1 VIEW: CPT

## 2023-02-24 PROCEDURE — 85025 COMPLETE CBC W/AUTO DIFF WBC: CPT

## 2023-02-24 PROCEDURE — 2700000000 HC OXYGEN THERAPY PER DAY

## 2023-02-24 PROCEDURE — 2060000000 HC ICU INTERMEDIATE R&B

## 2023-02-24 PROCEDURE — 82805 BLOOD GASES W/O2 SATURATION: CPT

## 2023-02-24 PROCEDURE — 87040 BLOOD CULTURE FOR BACTERIA: CPT

## 2023-02-24 PROCEDURE — 99285 EMERGENCY DEPT VISIT HI MDM: CPT

## 2023-02-24 PROCEDURE — 36415 COLL VENOUS BLD VENIPUNCTURE: CPT

## 2023-02-24 PROCEDURE — 96365 THER/PROPH/DIAG IV INF INIT: CPT

## 2023-02-24 PROCEDURE — 2580000003 HC RX 258: Performed by: STUDENT IN AN ORGANIZED HEALTH CARE EDUCATION/TRAINING PROGRAM

## 2023-02-24 PROCEDURE — 80053 COMPREHEN METABOLIC PANEL: CPT

## 2023-02-24 PROCEDURE — 81001 URINALYSIS AUTO W/SCOPE: CPT

## 2023-02-24 PROCEDURE — 70450 CT HEAD/BRAIN W/O DYE: CPT

## 2023-02-24 PROCEDURE — 93010 ELECTROCARDIOGRAM REPORT: CPT | Performed by: INTERNAL MEDICINE

## 2023-02-24 PROCEDURE — 85610 PROTHROMBIN TIME: CPT

## 2023-02-24 PROCEDURE — 6370000000 HC RX 637 (ALT 250 FOR IP): Performed by: STUDENT IN AN ORGANIZED HEALTH CARE EDUCATION/TRAINING PROGRAM

## 2023-02-24 PROCEDURE — 83605 ASSAY OF LACTIC ACID: CPT

## 2023-02-24 PROCEDURE — 87086 URINE CULTURE/COLONY COUNT: CPT

## 2023-02-24 PROCEDURE — 87150 DNA/RNA AMPLIFIED PROBE: CPT

## 2023-02-24 PROCEDURE — 6360000002 HC RX W HCPCS: Performed by: EMERGENCY MEDICINE

## 2023-02-24 PROCEDURE — 87077 CULTURE AEROBIC IDENTIFY: CPT

## 2023-02-24 PROCEDURE — 87186 SC STD MICRODIL/AGAR DIL: CPT

## 2023-02-24 RX ORDER — SODIUM CHLORIDE 0.9 % (FLUSH) 0.9 %
5-40 SYRINGE (ML) INJECTION EVERY 12 HOURS SCHEDULED
Status: DISCONTINUED | OUTPATIENT
Start: 2023-02-24 | End: 2023-03-01 | Stop reason: HOSPADM

## 2023-02-24 RX ORDER — AMLODIPINE BESYLATE 5 MG/1
5 TABLET ORAL DAILY
COMMUNITY

## 2023-02-24 RX ORDER — WARFARIN SODIUM 5 MG/1
5 TABLET ORAL
Status: COMPLETED | OUTPATIENT
Start: 2023-02-24 | End: 2023-02-24

## 2023-02-24 RX ORDER — 0.9 % SODIUM CHLORIDE 0.9 %
500 INTRAVENOUS SOLUTION INTRAVENOUS ONCE
Status: COMPLETED | OUTPATIENT
Start: 2023-02-24 | End: 2023-02-24

## 2023-02-24 RX ORDER — ACETAMINOPHEN 325 MG/1
650 TABLET ORAL EVERY 6 HOURS PRN
Status: DISCONTINUED | OUTPATIENT
Start: 2023-02-24 | End: 2023-03-01 | Stop reason: HOSPADM

## 2023-02-24 RX ORDER — SODIUM CHLORIDE 9 MG/ML
INJECTION, SOLUTION INTRAVENOUS PRN
Status: DISCONTINUED | OUTPATIENT
Start: 2023-02-24 | End: 2023-03-01 | Stop reason: HOSPADM

## 2023-02-24 RX ORDER — ISOSORBIDE DINITRATE 20 MG/1
30 TABLET ORAL DAILY
Status: DISCONTINUED | OUTPATIENT
Start: 2023-02-25 | End: 2023-03-01 | Stop reason: HOSPADM

## 2023-02-24 RX ORDER — ACETAMINOPHEN 650 MG/1
650 SUPPOSITORY RECTAL EVERY 6 HOURS PRN
Status: DISCONTINUED | OUTPATIENT
Start: 2023-02-24 | End: 2023-03-01 | Stop reason: HOSPADM

## 2023-02-24 RX ORDER — ENOXAPARIN SODIUM 100 MG/ML
30 INJECTION SUBCUTANEOUS DAILY
Status: CANCELLED | OUTPATIENT
Start: 2023-02-24

## 2023-02-24 RX ORDER — DOXAZOSIN MESYLATE 1 MG/1
1 TABLET ORAL NIGHTLY
Status: DISCONTINUED | OUTPATIENT
Start: 2023-02-24 | End: 2023-03-01 | Stop reason: HOSPADM

## 2023-02-24 RX ORDER — CARVEDILOL 6.25 MG/1
12.5 TABLET ORAL 2 TIMES DAILY WITH MEALS
Status: DISCONTINUED | OUTPATIENT
Start: 2023-02-24 | End: 2023-03-01 | Stop reason: HOSPADM

## 2023-02-24 RX ORDER — FUROSEMIDE 40 MG/1
40 TABLET ORAL EVERY OTHER DAY
Status: DISCONTINUED | OUTPATIENT
Start: 2023-02-25 | End: 2023-03-01 | Stop reason: HOSPADM

## 2023-02-24 RX ADMIN — SODIUM CHLORIDE, PRESERVATIVE FREE 10 ML: 5 INJECTION INTRAVENOUS at 22:00

## 2023-02-24 RX ADMIN — CEFTRIAXONE SODIUM 1000 MG: 1 INJECTION, POWDER, FOR SOLUTION INTRAMUSCULAR; INTRAVENOUS at 14:24

## 2023-02-24 RX ADMIN — DOXAZOSIN 1 MG: 1 TABLET ORAL at 21:59

## 2023-02-24 RX ADMIN — CARVEDILOL 12.5 MG: 6.25 TABLET, FILM COATED ORAL at 17:22

## 2023-02-24 RX ADMIN — WARFARIN SODIUM 5 MG: 5 TABLET ORAL at 21:59

## 2023-02-24 RX ADMIN — SACUBITRIL AND VALSARTAN 1 TABLET: 49; 51 TABLET, FILM COATED ORAL at 21:59

## 2023-02-24 RX ADMIN — SODIUM CHLORIDE 500 ML: 9 INJECTION, SOLUTION INTRAVENOUS at 12:59

## 2023-02-24 ASSESSMENT — PAIN - FUNCTIONAL ASSESSMENT: PAIN_FUNCTIONAL_ASSESSMENT: NONE - DENIES PAIN

## 2023-02-24 ASSESSMENT — ENCOUNTER SYMPTOMS
RESPIRATORY NEGATIVE: 1
GASTROINTESTINAL NEGATIVE: 1

## 2023-02-24 ASSESSMENT — LIFESTYLE VARIABLES
HOW OFTEN DO YOU HAVE A DRINK CONTAINING ALCOHOL: NEVER
HOW MANY STANDARD DRINKS CONTAINING ALCOHOL DO YOU HAVE ON A TYPICAL DAY: PATIENT DOES NOT DRINK

## 2023-02-24 NOTE — ED PROVIDER NOTES
EMERGENCY DEPARTMENT ENCOUNTER      CHIEF COMPLAINT:   Generalized weakness and fatigue\\  Nausea and vomiting  Confusion  Dysuria    HPI: Shelby Moss is a 80 y.o. male who presents to the emergency department, with family, or evaluation of nausea, vomiting, generalized weakness and fatigue and confusion. The patient had a cystoscopy 2 days ago. Since that time he has been generally weak and fatigued. He has had multiple episodes of nausea and vomiting. He has had difficulty keeping anything down. Family states he has been intermittently confused. He complains of dysuria and urinary frequency but is only passing small amounts of urine at a time. He has suprapubic pressure. He denies other abdominal pain. He denies hematemesis. He denies diarrhea or constipation. He denies fevers, chills, chest pain, shortness of breath or any other complaints. REVIEW OF SYSTEMS:   Constitutional:  Denies fever or chills  Eyes:  Denies change in visual acuity  HENT:  Denies nasal congestion or sore throat  Respiratory:  Denies cough or shortness of breath  Cardiovascular:  Denies chest pain or edema  GI: See HPI  : See HPI  Musculoskeletal:  Denies back pain or joint pain  Integument:  Denies rash  Neurologic: See HPI  \"Remaining review of systems reviewed and negative. I have reviewed the nursing triage documentation and agree unless otherwise noted below. \"      PAST MEDICAL HISTORY:   Past Medical History:   Diagnosis Date    Chronic kidney disease     Heart abnormality     Hypertension     UTI (urinary tract infection)        CURRENT MEDICATIONS:   Home medications reviewed. SURGICAL HISTORY:   History reviewed. No pertinent surgical history.     FAMILY HISTORY:   Family History   Problem Relation Age of Onset    Kidney Disease Mother     Hypertension Mother     Diabetes Mother     Diabetes Granddaughter     Hypertension Daughter     Cancer Daughter        SOCIAL HISTORY:   Social History     Socioeconomic History    Marital status: Single     Spouse name: Not on file    Number of children: Not on file    Years of education: Not on file    Highest education level: Not on file   Occupational History    Not on file   Tobacco Use    Smoking status: Never    Smokeless tobacco: Never   Vaping Use    Vaping Use: Never used   Substance and Sexual Activity    Alcohol use: Never    Drug use: Never    Sexual activity: Not on file   Other Topics Concern    Not on file   Social History Narrative    Not on file     Social Determinants of Health     Financial Resource Strain: Not on file   Food Insecurity: Not on file   Transportation Needs: Not on file   Physical Activity: Not on file   Stress: Not on file   Social Connections: Not on file   Intimate Partner Violence: Not on file   Housing Stability: Not on file       ALLERGIES: Patient has no known allergies. PHYSICAL EXAM:  VITAL SIGNS:   ED Triage Vitals [02/24/23 1102]   Enc Vitals Group      /74      Heart Rate 99      Resp 18      Temp 98.7 °F (37.1 °C)      Temp Source Oral      SpO2 98 %      Weight 214 lb (97.1 kg)      Height 6' 4\" (1.93 m)      Head Circumference       Peak Flow       Pain Score       Pain Loc       Pain Edu? Excl. in [address]? Constitutional: Awake, alert  HENT: Normocephalic, Atraumatic, Bilateral external ears normal, Oropharynx tacky no oral exudates, Nose normal.  Eyes:  PERRL, Conjunctiva normal, No discharge. Neck: Normal range of motion, No tenderness, Supple, No stridor, No meningeal signs,No lymphadenopathy   Cardiovascular:  Normal heart rate, Normal rhythm  Pulmonary/Chest:  Normal breath sounds, No respiratory distress, No wheezing  Abdomen:   Bowel sounds normal, Soft, No tenderness, No masses, No pulsatile masses  Extremities:  Normal range of motion, Intact distal pulses, No edema, No tenderness  Neurologic: Awake and alert, speech clear, normal motor function, Sensation intact to light touch throughout, No focal deficits  Skin:  Warm, Dry, No erythema, No rash      EKG Interpretation  Interpreted by me  Compared to 8/22/2022  Rhythm: normal sinus  Rate:normal  Axis: left 93  Ectopy: PACs  Conduction: Chronic left bundle branch block  ST Segments: no acute change  T Waves: no acute change  Clinical Impression: normal sinus rhythm , LAD, chronic LBBB, PACs    Cardiac Monitor Strip Interpretation  Interpreted by me  Monitor strip interpreted for greater than 10 seconds  Rhythm: normal sinus  Rate: normal  Ectopy: none  ST Segments: normal      Radiology / Procedures:     CT HEAD WO CONTRAST (Final result)  Result time 02/24/23 13:14:16  Final result by Becca Griffin DO (02/24/23 13:14:16)                Impression:    No acute intracranial abnormality. Narrative:    EXAMINATION:   CT OF THE HEAD WITHOUT CONTRAST  2/24/2023 12:22 pm     TECHNIQUE:   CT of the head was performed without the administration of intravenous   contrast. Automated exposure control, iterative reconstruction, and/or weight   based adjustment of the mA/kV was utilized to reduce the radiation dose to as   low as reasonably achievable. COMPARISON:   None. HISTORY:   ORDERING SYSTEM PROVIDED HISTORY: Confusion       FINDINGS:   BRAIN/VENTRICLES:  No evidence of an acute infarct or other acute parenchymal   process. No evidence of acute intracranial hemorrhage. There is no evidence   of an intracranial mass or extraaxial fluid collection. No significant mass   effect or midline shift. There is mild generalized volume loss. Ventricular   enlargement concordant with the degree of parenchymal volume loss. Scattered   patchy foci of low attenuation are present within supratentorial white matter   which is a nonspecific finding but likely represents mild chronic   microvascular ischemia. Remote lacunar infarcts throughout the bilateral   basal ganglia. ORBITS: The visualized portion of the orbits demonstrate no acute   abnormality. Right scleral banding. Left lens extraction. SINUSES:  The visualized paranasal sinuses and mastoid air cells demonstrate   no acute abnormality. SOFT TISSUES/SKULL: No acute abnormality of the visualized skull or soft   tissues. XR CHEST PORTABLE (Preliminary result)  Result time 02/24/23 13:09:10  Preliminary result by Kian Don DO (02/24/23 13:09:10)                Impression:    No acute process. Narrative:    EXAMINATION:   ONE XRAY VIEW OF THE CHEST     2/24/2023 12:08 pm     COMPARISON:   1/31/2023, 9/7/2022     HISTORY:   ORDERING SYSTEM PROVIDED HISTORY: Confusion   TECHNOLOGIST PROVIDED HISTORY:   Reason for exam:->Confusion   Reason for Exam: Confusion     FINDINGS:   The cardiomediastinal silhouette is unchanged. Bibasilar streaky opacities   persist, favoring scarring or atelectasis. No new focal consolidation,   pneumothorax, or pleural effusion. Osseous structures are diffusely   demineralized and grossly unchanged. Preliminary result by Joaquín Castro (02/24/23 13:09:10)                Impression:    No acute process.                Labs Reviewed   CBC WITH AUTO DIFFERENTIAL - Abnormal; Notable for the following components:    WBC 24.3 (*)     RBC 4.02 (*)     Hemoglobin 11.4 (*)     Hematocrit 37.1 (*)     MCHC 30.7 (*)     Platelets 536 (*)     Segs Relative 81.7 (*)     Lymphocytes % 6.2 (*)     Monocytes % 10.3 (*)     Immature Neutrophil % 1.6 (*)     All other components within normal limits   COMPREHENSIVE METABOLIC PANEL - Abnormal; Notable for the following components:    Chloride 98 (*)     BUN 39 (*)     Creatinine 2.5 (*)     Est, Glom Filt Rate 24 (*)     Glucose 120 (*)     Calcium 7.5 (*)     Total Bilirubin 1.3 (*)     ALT 6 (*)     All other components within normal limits   URINALYSIS - Abnormal; Notable for the following components:    Clarity, UA SLIGHTLY CLOUDY (*)     Blood, Urine SMALL NUMBER OR AMOUNT OBSERVED (*) Protein, UA TRACE (*)     Leukocyte Esterase, Urine LARGE NUMBER OR AMOUNT OBSERVED (*)     All other components within normal limits   BLOOD GAS, VENOUS - Abnormal; Notable for the following components:    pO2, Trav 179 (*)     Base Exc, Mixed 5.3 (*)     HCO3, Venous 31.1 (*)     O2 Sat, Trav 94.8 (*)     All other components within normal limits   TROPONIN - Abnormal; Notable for the following components:    Troponin T 0.017 (*)     All other components within normal limits   MICROSCOPIC URINALYSIS - Abnormal; Notable for the following components:    RBC, UA 13 (*)     WBC,  (*)     Bacteria, UA MODERATE (*)     Mucus, UA RARE (*)     All other components within normal limits       ED COURSE & MEDICAL DECISION MAKING:  Neftaly Clarke is a 80 y.o. male  who presents to the emergency department, with family, or evaluation of nausea, vomiting, generalized weakness and fatigue and confusion. The patient had a cystoscopy 2 days ago. Since that time he has been generally weak and fatigued. He has had multiple episodes of nausea and vomiting. He has had difficulty keeping anything down. Family states he has been intermittently confused. He complains of dysuria and urinary frequency but is only passing small amounts of urine at a time. He has suprapubic pressure. He denies other abdominal pain. He denies hematemesis. He denies diarrhea or constipation. He denies fevers, chills, chest pain, shortness of breath or any other complaints. History from : Patient and Family at bedside    Limitations to history : None    Chronic conditions affecting care: None    Social Determinants : None    Records Reviewed : None    On exam, the patient is afebrile and nontoxic appearing. He is hemodynamically stable and neurologically intact. Physical exam is unremarkable. EKG shows a normal sinus rhythm with no ST elevation or depression.  Labs are obtained and are significant for a normal venous blood gas, a detectable troponin of 0.017, a normal lactic acid of 1.6, acute on chronic kidney disease, significant leukocytosis of 24.3, mild anemia which appears chronic and a urinalysis concerning for UTI. CT head is negative. Chest x-ray is negative. The patient was treated with IV normal saline and IV Rocephin as below. Patient was given the following medications:  Medications   0.9 % sodium chloride bolus (0 mLs IntraVENous Stopped 2/24/23 1415)   cefTRIAXone (ROCEPHIN) 1,000 mg in sodium chloride 0.9 % 50 mL IVPB (mini-bag) (0 mg IntraVENous Stopped 2/24/23 1527)       Diagnosis and Differential Diagnosis:  I suspect that the patient has delirium in the setting of acute cystitis with hematuria. This is likely the cause of his nausea and vomiting. The etiology of his detectable troponin is not entirely clear at this time. EKG appears to show a normal sinus rhythm with no ST elevation or depression. . I have a low suspicion for STEMI, pneumonia, CVA, intracranial hemorrhage, intoxication, psychosis, meningitis, brain mass, or sepsis. Disposition Considerations:  I recommended admission to the hospital and the patient was agreeable. I discussed the case with the hospitalist who will admit the patient for further treatment and care. The patient is currently in stable condition awaiting admission. I am the Primary Clinician of Record. Clinical Impression:  1. Delirium    2. Acute cystitis with hematuria    3. Nausea and vomiting, unspecified vomiting type    4. Elevated troponin        Comment: Please note this report has been produced using speech recognition software and may contain errors related to that system including errors in grammar, punctuation, and spelling, as well as words and phrases that may be inappropriate. If there are any questions or concerns please feel free to contact the dictating provider for clarification.         Krunal Carlson MD  02/28/23 2898

## 2023-02-24 NOTE — H&P
History and Physical 23        NAME: Bill Stoddard  : 1935  MRN: [id number]      Assessment/Plan:  Bill Stoddard is a 80 y.o. male with a history of HTN, paroxysmal A-fib, chronic systolic CHF, stage IIIa CKD, and legally blind  who presented to [facility] 2023 with severe sepsis in the setting of an acute cystitis    Plan:  #Severe sepsis  #Acute cystitis w/ hematuria  --Pt had a cystoscopy a week ago and has since developed dysuria  --UA is positive for leukocyte esterace, hematuria, pyuria abd bacteriuria  --Admit to telemetry, gentle fluid for hydration (limited in view of EF)  --Continue IV antibiotics, follow-up cultures and de-escalate as appropriate    #AMS  --Patient appears to become intermittently confused per family, on exam he is lucid and answers appropriately  --CT head is neg for acute intracranial abnormalities  --Delirium precautions, keep oriented and engaged  --Avoid meds that affect mentation    #Nausea & Vomiting  --Likely 2/2 to infection  --Patient reports feeling better this afternoon and would like to eat  --Antiemetics as needed  --monitor BMP    #JESSICA on CKD 3a  --Gente fluid hydration given his EF  --Cr 2.5 (bl 2.1-2.2)  --Monitor closely as he is not far off from baseline, if unimproved or worsening, will consider nephro consult    #Elevated trops  --Trops elevated, trend   --EKG is non-ischemic, NSR w/ PACs and old LBB  --Consider cardio consult if trops continue to rise    #HTN  --Home regimen of hydralazine 25mg TID, Coreg 12.5mg BID, amlodipine 5mg QD and isosorbide dinitrate 30mg qd  --Monitor BP trends and adjust therapy as necessary    #CHF   #Ischemic CM  --EF 20-25%,  w/ wall motion abnormalities  and stress test w large anteriro septal defect  --Continue Entresto, BB, Imdur and furosemide  --Follow up 2D echo    #Paroxysmal Afib  --Continue coumadin and rate control  --Pharmacy to dose    #Legally blind  --Fall precautions, patient is wheelchair bound    DVT Prophylaxis: Coumadin  Code Status/Surrogate Decision Maker:       Current living situation: Home  Expected Disposition: Home  Estimated discharge date: 2-3 days      Chief Complaint:    Intractable nausea and alteration in mentation    History of Present Illness:    59-year-old gentleman with a past medical history of HTN, paroxysmal A-fib, chronic systolic CHF, stage IIIa CKD, and legally blind who presents to the ED from home with complaints of intractable vomiting as well as alteration in mentation. Family he recently had a cystoscopy and has since then developed urinary frequency and urgency. Presentation he is hemodynamically stable but tachycardic to 99 and requiring 3 L of O2 via nasal cannula up CHEM panel is notable for creatinine of 2.5 (, baseline is around 2.1-2.2), initial troponin 0.017, EKG is unremarkable for ischemic events, bilirubin elevated to 1.3. His CBC is significant for white blood cell count elevated at 24.3, H&H 11.4/37.1 respectively. UA is positive for large amount of leukocyte Estrace, some hematuria with pyuria and bacteriuria. Chest x-ray is negative for any acute process and CT head without contrast is negative for an acute intracranial abnormality. Been admitted for severe sepsis secondary to acute cystitis with AMS    ROS:    Review of Systems   Constitutional:  Positive for appetite change. HENT: Negative. Eyes:         Legally blind   Respiratory: Negative. Cardiovascular: Negative. Gastrointestinal: Negative. Genitourinary:  Positive for dysuria. Musculoskeletal: Negative. Skin: Negative. Neurological: Negative. Psychiatric/Behavioral: Negative. Past Medical, Surgical, Social, Family History:   Past Medical History:   Diagnosis Date    Chronic kidney disease     Heart abnormality     Hypertension     UTI (urinary tract infection)      History reviewed. No pertinent surgical history.   Social History     Socioeconomic History Marital status: Single     Spouse name: Not on file    Number of children: Not on file    Years of education: Not on file    Highest education level: Not on file   Occupational History    Not on file   Tobacco Use    Smoking status: Never    Smokeless tobacco: Never   Vaping Use    Vaping Use: Never used   Substance and Sexual Activity    Alcohol use: Never    Drug use: Never    Sexual activity: Not on file   Other Topics Concern    Not on file   Social History Narrative    Not on file     Social Determinants of Health     Financial Resource Strain: Not on file   Food Insecurity: Not on file   Transportation Needs: Not on file   Physical Activity: Not on file   Stress: Not on file   Social Connections: Not on file   Intimate Partner Violence: Not on file   Housing Stability: Not on file     Family History   Problem Relation Age of Onset    Kidney Disease Mother     Hypertension Mother     Diabetes Mother     Diabetes Granddaughter     Hypertension Daughter     Cancer Daughter        Home Medications:  Prior to Admission medications    Medication Sig Start Date End Date Taking?  Authorizing Provider   sacubitril-valsartan (ENTRESTO) 49-51 MG per tablet Take 1 tablet by mouth 2 times daily 1/16/23   Nancy Cooney MD   carvedilol (COREG) 12.5 MG tablet Take 1 tablet by mouth 2 times daily (with meals) 1/16/23   Nancy Cooney MD   sacubitril-valsartan (ENTRESTO) 24-26 MG per tablet Take 1 tablet by mouth 2 times daily  Patient taking differently: Take 1 tablet by mouth 2 times daily Patient is currently taking this dose will increase to other dose when finished with this bottle 11/21/22   Nancy Cooney MD   Multiple Vitamins-Minerals (PRESERVISION AREDS 2 PO) Take 1 tablet by mouth daily    Historical Provider, MD   docusate sodium (COLACE) 100 MG capsule Take 100 mg by mouth daily 10/1/22   Historical Provider, MD   latanoprost (XALATAN) 0.005 % ophthalmic solution INSTILL 1 DROP IN RIGHT EYE AT BEDTIME 9/14/22   Historical Provider, MD   isosorbide dinitrate (ISORDIL) 30 MG tablet Take 30 mg by mouth daily    Historical Provider, MD   ondansetron (ZOFRAN) 4 MG tablet Take 4 mg by mouth as needed for Nausea or Vomiting    Historical Provider, MD   Oxygen Concentrator     Historical Provider, MD   Omega-3 Fatty Acids (OMEGA-3 FISH OIL) 1000 MG CAPS Take by mouth    Historical Provider, MD   diphenhydrAMINE HCl (BENADRYL ALLERGY PO) Take by mouth 25 mg 1 AM, 2 QPM    Historical Provider, MD   furosemide (LASIX) 40 MG tablet Take 1 tablet by mouth every other day 8/22/22   Cynthia Britton MD   hydrALAZINE (APRESOLINE) 25 MG tablet Take 1 tablet by mouth 3 times daily 7/5/22 1/18/23  Fabby Begum MD   terazosin (HYTRIN) 5 MG capsule Take 1 capsule by mouth nightly 6/21/22   Fabby Begum MD   warfarin (COUMADIN) 5 MG tablet Take 1 tablet by mouth daily 6/21/22   Fabby Begum MD   potassium chloride (KLOR-CON M) 20 MEQ extended release tablet Take 1 tablet by mouth daily 2/16/22   Fabby Begum MD         Physical Exam:     BP (!) 121/107   Pulse 95   Temp 98.7 °F (37.1 °C) (Oral)   Resp 22   Ht 6' 4\" (1.93 m)   Wt 214 lb (97.1 kg)   SpO2 99%   BMI 26.05 kg/m²       General: Awake, interactive and saturating appropriately on RA  Eyes: Legally base  ENT: Moist mucous membrane  Cardiovascular: Regular rate. Respiratory: Clear to auscultation  Gastrointestinal: Central obesity, non tender  Genitourinary: no suprapubic tenderness  Musculoskeletal: No edema  Skin: warm, dry  Neuro: Alert. Psych: Mood appropriate.          Labs, Imaging, and Studies reviewed:    CT HEAD WO CONTRAST    Result Date: 2/24/2023  EXAMINATION: CT OF THE HEAD WITHOUT CONTRAST  2/24/2023 12:22 pm TECHNIQUE: CT of the head was performed without the administration of intravenous contrast. Automated exposure control, iterative reconstruction, and/or weight based adjustment of the mA/kV was utilized to reduce the radiation dose to as low as reasonably achievable. COMPARISON: None. HISTORY: ORDERING SYSTEM PROVIDED HISTORY: Confusion FINDINGS: BRAIN/VENTRICLES:  No evidence of an acute infarct or other acute parenchymal process. No evidence of acute intracranial hemorrhage. There is no evidence of an intracranial mass or extraaxial fluid collection. No significant mass effect or midline shift. There is mild generalized volume loss. Ventricular enlargement concordant with the degree of parenchymal volume loss. Scattered patchy foci of low attenuation are present within supratentorial white matter which is a nonspecific finding but likely represents mild chronic microvascular ischemia. Remote lacunar infarcts throughout the bilateral basal ganglia. ORBITS: The visualized portion of the orbits demonstrate no acute abnormality. Right scleral banding. Left lens extraction. SINUSES:  The visualized paranasal sinuses and mastoid air cells demonstrate no acute abnormality. SOFT TISSUES/SKULL: No acute abnormality of the visualized skull or soft tissues. No acute intracranial abnormality. XR CHEST PORTABLE    Result Date: 2/24/2023  EXAMINATION: ONE XRAY VIEW OF THE CHEST 2/24/2023 12:08 pm COMPARISON: 1/31/2023, 9/7/2022 HISTORY: ORDERING SYSTEM PROVIDED HISTORY: Confusion TECHNOLOGIST PROVIDED HISTORY: Reason for exam:->Confusion Reason for Exam: Confusion FINDINGS: The cardiomediastinal silhouette is unchanged. Bibasilar streaky opacities persist, favoring scarring or atelectasis. No new focal consolidation, pneumothorax, or pleural effusion. Osseous structures are diffusely demineralized and grossly unchanged. No acute process.        CBC:   Recent Labs     02/24/23  1216   WBC 24.3*   HGB 11.4*   *     BMP:    Recent Labs     02/24/23  1216      K 4.7   CL 98*   CO2 29   BUN 39*   CREATININE 2.5*   GLUCOSE 120*     Hepatic:   Recent Labs     02/24/23  1216   AST 18   ALT 6*   BILITOT 1.3*   ALKPHOS 72     Lipids: No results found for: CHOL, HDL, TRIG  Hemoglobin A1C: No results found for: LABA1C  TSH: No results found for: TSH  Troponin:   Lab Results   Component Value Date/Time    TROPONINT 0.017 02/24/2023 12:16 PM     Lactic Acid: No results for input(s): LACTA in the last 72 hours. BNP: No results for input(s): PROBNP in the last 72 hours.   UA:  Lab Results   Component Value Date/Time    NITRU NEGATIVE 02/24/2023 01:00 PM    COLORU YELLOW 02/24/2023 01:00 PM    WBCUA 270 02/24/2023 01:00 PM    RBCUA 13 02/24/2023 01:00 PM    MUCUS RARE 02/24/2023 01:00 PM    TRICHOMONAS NONE SEEN 02/24/2023 01:00 PM    BACTERIA MODERATE 02/24/2023 01:00 PM    CLARITYU SLIGHTLY CLOUDY 02/24/2023 01:00 PM    SPECGRAV <1.005 02/24/2023 01:00 PM    LEUKOCYTESUR LARGE NUMBER OR AMOUNT OBSERVED 02/24/2023 01:00 PM    UROBILINOGEN 1.0 02/24/2023 01:00 PM    BILIRUBINUR NEGATIVE 02/24/2023 01:00 PM    BLOODU SMALL NUMBER OR AMOUNT OBSERVED 02/24/2023 01:00 PM    [address] 02/24/2023 01:00 PM     Urine Cultures: No results found for: LABURIN  Blood Cultures: No results found for: BC  No results found for: BLOODCULT2  Organism: No results found for: [facility]          Electronically signed by Nargis Pinto MD on 2/24/2023 at 2:39 PM

## 2023-02-24 NOTE — ED NOTES
ED TO INPATIENT SBAR HANDOFF    Patient Name: Ney Lamb   :  1935  80 y.o. MRN:  [id number]  Preferred Name  Cha Caballero  ED Room #:  ED16/ED-16  Family/Caregiver Present yes   Restraints no   Sitter no   Sepsis Risk Score Sepsis Risk Score: 10.14    Situation  Code Status: No Order No additional code details. Allergies: Patient has no known allergies. Weight: Patient Vitals for the past 96 hrs (Last 3 readings):   Weight   23 1102 214 lb (97.1 kg)     Arrived from: home, lives with daughter  Chief Complaint:   Chief Complaint   Patient presents with    Emesis    Fatigue     Hospital Problem/Diagnosis:  Principal Problem:    Severe sepsis ([address]  Resolved Problems:    * No resolved hospital problems. *    Imaging:   CT HEAD WO CONTRAST   Final Result   No acute intracranial abnormality. XR CHEST PORTABLE   Preliminary Result   No acute process.            Abnormal labs:   Abnormal Labs Reviewed   CBC WITH AUTO DIFFERENTIAL - Abnormal; Notable for the following components:       Result Value    WBC 24.3 (*)     RBC 4.02 (*)     Hemoglobin 11.4 (*)     Hematocrit 37.1 (*)     MCHC 30.7 (*)     Platelets 110 (*)     Segs Relative 81.7 (*)     Lymphocytes % 6.2 (*)     Monocytes % 10.3 (*)     Immature Neutrophil % 1.6 (*)     All other components within normal limits   COMPREHENSIVE METABOLIC PANEL - Abnormal; Notable for the following components:    Chloride 98 (*)     BUN 39 (*)     Creatinine 2.5 (*)     Est, Glom Filt Rate 24 (*)     Glucose 120 (*)     Calcium 7.5 (*)     Total Bilirubin 1.3 (*)     ALT 6 (*)     All other components within normal limits   URINALYSIS - Abnormal; Notable for the following components:    Clarity, UA SLIGHTLY CLOUDY (*)     Blood, Urine SMALL NUMBER OR AMOUNT OBSERVED (*)     Protein, UA TRACE (*)     Leukocyte Esterase, Urine LARGE NUMBER OR AMOUNT OBSERVED (*)     All other components within normal limits   BLOOD GAS, VENOUS - Abnormal; Notable for the following components:    pO2, Trav 179 (*)     Base Exc, Mixed 5.3 (*)     HCO3, Venous 31.1 (*)     O2 Sat, Trav 94.8 (*)     All other components within normal limits   TROPONIN - Abnormal; Notable for the following components:    Troponin T 0.017 (*)     All other components within normal limits   MICROSCOPIC URINALYSIS - Abnormal; Notable for the following components:    RBC, UA 13 (*)     WBC,  (*)     Bacteria, UA MODERATE (*)     Mucus, UA RARE (*)     All other components within normal limits     Critical values: no     Abnormal Assessment Findings:     Background  History:   Past Medical History:   Diagnosis Date    Chronic kidney disease     Heart abnormality     Hypertension     UTI (urinary tract infection)        Assessment    Vitals/MEWS: MEWS Score: 1  Level of Consciousness: Alert (0)   Vitals:    02/24/23 1203 02/24/23 1302 02/24/23 1332 02/24/23 1401   BP: 115/71 (!) 116/57 130/61 (!) 121/107   Pulse: 97 95 96 95   Resp: 18 14 20 22   Temp:       TempSrc:       SpO2: 97% 99% 99% 99%   Weight:       Height:         FiO2 (%):   O2 Flow Rate: O2 Device: Nasal cannula O2 Flow Rate (L/min): (S) 3 L/min (pt on 3 Lpm O2 via 24/7)  Cardiac Rhythm: sinus rhythm  Pain Assessment: 0 [x] Verbal [] Waylan Curtis Scale  Pain Scale: Pain Assessment  Pain Assessment: None - Denies Pain  Last documented pain score (0-10 scale)    Last documented pain medication administered: none  Mental Status: oriented and alert  NIH Score: NIH     C-SSRS: Risk of Suicide: No Risk  Bedside swallow:    [place] Coma Scale (GCS): Shakila Coma Scale  Eye Opening: Spontaneous  Best Verbal Response: Oriented  Best Motor Response: Obeys commands  Shakila Coma Scale Score: 15  Active LDA's:   Peripheral IV 02/24/23 Right Forearm (Active)   Site Assessment Clean, dry & intact 02/24/23 1220   Line Status Blood return noted 02/24/23 1220   Phlebitis Assessment No symptoms 02/24/23 1220   Infiltration Assessment 0 02/24/23 1220 Dressing Status New dressing applied 02/24/23 1220   Dressing Type Transparent 02/24/23 1220   Dressing Intervention New 02/24/23 1220     PO Status: Regular  Pertinent or High Risk Medications/Drips: no   o If Yes, please provide details:   Pending Blood Product Administration: no     You may also review the ED PT Care Timeline found under the Summary Nursing Index tab. Recommendation    Pending orders none  Plan for Discharge (if known):    Additional Comments: pt is blind  If any further questions, please [place] RN at [phone]    Electronically signed by: Electronically signed by Juan Wolfe RN on 2/24/2023 at 3:18 PM     Juan Wolfe RN  02/24/23 40 Taty Washburn RN  02/24/23 [id number]

## 2023-02-24 NOTE — ED NOTES
Pt to ED today for c/o nausea and vomiting since yesterday. Pt also c/o generalized weakness/fatigue. Pt lives at home with daughter. Pt is wheelchair bound and reportedly blind. Pt alert and oriented. Pt states his daughter was sick with stomach bug over the weekend and pt states he now has it.  Pt denies abd pain     Beatriz Mortimer, RN  02/24/23 1118

## 2023-02-24 NOTE — PROGRESS NOTES
4 Eyes Skin Assessment     NAME:  Shaye Moore  YOB: 1935  MEDICAL RECORD NUMBER:  [id number]    The patient is being assessed for  Admission    I agree that One RN has performed a thorough Head to Toe Skin Assessment on the patient. ALL assessment sites listed below have been assessed. Areas assessed by both nurses:    Head, Face, Ears, Shoulders, Back, Chest, Arms, Elbows, Hands, Sacrum. Buttock, Coccyx, Ischium, and Legs. Feet and Heels        Does the Patient have a Wound? Yes wound(s) were present on assessment.  LDA wound assessment was Initiated and completed by RN       James Prevention initiated by RN: Yes   Wound Care Orders initiated by RN: Yes    Pressure Injury (Stage 3,4, Unstageable, DTI, NWPT, and Complex wounds) if present, place referral order by RN under : No    New and Established Ostomies, if present place, referral order under : No      Nurse 1 eSignature: Electronically signed by Gisel Bowers RN on 2/24/23 at 5:33 PM EST    **SHARE this note so that the co-signing nurse can place an eSignature**    Nurse 2 eSignature: {Esignature:[id number]}

## 2023-02-25 ENCOUNTER — APPOINTMENT (OUTPATIENT)
Dept: ULTRASOUND IMAGING | Age: 88
DRG: 871 | End: 2023-02-25
Payer: MEDICARE

## 2023-02-25 LAB
ALBUMIN SERPL-MCNC: 3.2 GM/DL (ref 3.4–5)
ALP BLD-CCNC: 66 IU/L (ref 40–128)
ALT SERPL-CCNC: 6 U/L (ref 10–40)
ANION GAP SERPL CALCULATED.3IONS-SCNC: 9 MMOL/L (ref 4–16)
AST SERPL-CCNC: 25 IU/L (ref 15–37)
BASOPHILS ABSOLUTE: 0.1 K/CU MM
BASOPHILS RELATIVE PERCENT: 0.2 % (ref 0–1)
BILIRUB SERPL-MCNC: 0.5 MG/DL (ref 0–1)
BUN SERPL-MCNC: 42 MG/DL (ref 6–23)
CALCIUM SERPL-MCNC: 7.1 MG/DL (ref 8.3–10.6)
CHLORIDE BLD-SCNC: 99 MMOL/L (ref 99–110)
CO2: 29 MMOL/L (ref 21–32)
CREAT SERPL-MCNC: 2.7 MG/DL (ref 0.9–1.3)
DIFFERENTIAL TYPE: ABNORMAL
EOSINOPHILS ABSOLUTE: 0 K/CU MM
EOSINOPHILS RELATIVE PERCENT: 0.2 % (ref 0–3)
GFR SERPL CREATININE-BSD FRML MDRD: 22 ML/MIN/1.73M2
GLUCOSE SERPL-MCNC: 87 MG/DL (ref 70–99)
HCT VFR BLD CALC: 33.2 % (ref 42–52)
HEMOGLOBIN: 10 GM/DL (ref 13.5–18)
IMMATURE NEUTROPHIL %: 0.7 % (ref 0–0.43)
INR BLD: 1.54 INDEX
LYMPHOCYTES ABSOLUTE: 2.4 K/CU MM
LYMPHOCYTES RELATIVE PERCENT: 11.4 % (ref 24–44)
MCH RBC QN AUTO: 28.1 PG (ref 27–31)
MCHC RBC AUTO-ENTMCNC: 30.1 % (ref 32–36)
MCV RBC AUTO: 93.3 FL (ref 78–100)
MONOCYTES ABSOLUTE: 2.2 K/CU MM
MONOCYTES RELATIVE PERCENT: 10.2 % (ref 0–4)
NUCLEATED RBC %: 0 %
PDW BLD-RTO: 14.6 % (ref 11.7–14.9)
PLATELET # BLD: 114 K/CU MM (ref 140–440)
PMV BLD AUTO: 10.3 FL (ref 7.5–11.1)
POTASSIUM SERPL-SCNC: 4 MMOL/L (ref 3.5–5.1)
PROCALCITONIN SERPL-MCNC: 0.58 NG/ML
PROTHROMBIN TIME: 19.9 SECONDS (ref 11.7–14.5)
RBC # BLD: 3.56 M/CU MM (ref 4.6–6.2)
SEGMENTED NEUTROPHILS ABSOLUTE COUNT: 16.5 K/CU MM
SEGMENTED NEUTROPHILS RELATIVE PERCENT: 77.3 % (ref 36–66)
SODIUM BLD-SCNC: 137 MMOL/L (ref 135–145)
TOTAL IMMATURE NEUTOROPHIL: 0.16 K/CU MM
TOTAL NUCLEATED RBC: 0 K/CU MM
TOTAL PROTEIN: 5.5 GM/DL (ref 6.4–8.2)
TROPONIN T: 0.01 NG/ML
WBC # BLD: 21.4 K/CU MM (ref 4–10.5)

## 2023-02-25 PROCEDURE — 6360000002 HC RX W HCPCS: Performed by: FAMILY MEDICINE

## 2023-02-25 PROCEDURE — 85610 PROTHROMBIN TIME: CPT

## 2023-02-25 PROCEDURE — 36415 COLL VENOUS BLD VENIPUNCTURE: CPT

## 2023-02-25 PROCEDURE — 2700000000 HC OXYGEN THERAPY PER DAY

## 2023-02-25 PROCEDURE — 84484 ASSAY OF TROPONIN QUANT: CPT

## 2023-02-25 PROCEDURE — 76775 US EXAM ABDO BACK WALL LIM: CPT

## 2023-02-25 PROCEDURE — 2580000003 HC RX 258: Performed by: STUDENT IN AN ORGANIZED HEALTH CARE EDUCATION/TRAINING PROGRAM

## 2023-02-25 PROCEDURE — 84145 PROCALCITONIN (PCT): CPT

## 2023-02-25 PROCEDURE — 6370000000 HC RX 637 (ALT 250 FOR IP): Performed by: STUDENT IN AN ORGANIZED HEALTH CARE EDUCATION/TRAINING PROGRAM

## 2023-02-25 PROCEDURE — 94761 N-INVAS EAR/PLS OXIMETRY MLT: CPT

## 2023-02-25 PROCEDURE — 2500000003 HC RX 250 WO HCPCS: Performed by: FAMILY MEDICINE

## 2023-02-25 PROCEDURE — 6360000002 HC RX W HCPCS: Performed by: STUDENT IN AN ORGANIZED HEALTH CARE EDUCATION/TRAINING PROGRAM

## 2023-02-25 PROCEDURE — 85025 COMPLETE CBC W/AUTO DIFF WBC: CPT

## 2023-02-25 PROCEDURE — 80053 COMPREHEN METABOLIC PANEL: CPT

## 2023-02-25 PROCEDURE — 1200000000 HC SEMI PRIVATE

## 2023-02-25 RX ORDER — FAMOTIDINE 40 MG/1
40 TABLET, FILM COATED ORAL DAILY
COMMUNITY
Start: 2023-01-31

## 2023-02-25 RX ORDER — WARFARIN SODIUM 5 MG/1
5 TABLET ORAL DAILY
Status: DISCONTINUED | OUTPATIENT
Start: 2023-02-25 | End: 2023-02-28

## 2023-02-25 RX ORDER — GUAIFENESIN 200 MG/10ML
200 LIQUID ORAL EVERY 4 HOURS PRN
Status: DISCONTINUED | OUTPATIENT
Start: 2023-02-25 | End: 2023-03-01 | Stop reason: HOSPADM

## 2023-02-25 RX ORDER — PROCHLORPERAZINE EDISYLATE 5 MG/ML
5 INJECTION INTRAMUSCULAR; INTRAVENOUS EVERY 6 HOURS PRN
Status: DISPENSED | OUTPATIENT
Start: 2023-02-25 | End: 2023-02-26

## 2023-02-25 RX ORDER — FINASTERIDE 5 MG/1
5 TABLET, FILM COATED ORAL DAILY
COMMUNITY
Start: 2023-02-22

## 2023-02-25 RX ORDER — PROCHLORPERAZINE MALEATE 5 MG/1
5 TABLET ORAL EVERY 6 HOURS PRN
Status: DISPENSED | OUTPATIENT
Start: 2023-02-25 | End: 2023-02-26

## 2023-02-25 RX ADMIN — SODIUM CHLORIDE, PRESERVATIVE FREE 10 ML: 5 INJECTION INTRAVENOUS at 09:11

## 2023-02-25 RX ADMIN — CARVEDILOL 12.5 MG: 6.25 TABLET, FILM COATED ORAL at 16:37

## 2023-02-25 RX ADMIN — WARFARIN SODIUM 5 MG: 5 TABLET ORAL at 16:37

## 2023-02-25 RX ADMIN — SODIUM CHLORIDE 25 ML: 9 INJECTION, SOLUTION INTRAVENOUS at 09:13

## 2023-02-25 RX ADMIN — DOXAZOSIN 1 MG: 1 TABLET ORAL at 20:29

## 2023-02-25 RX ADMIN — SACUBITRIL AND VALSARTAN 1 TABLET: 49; 51 TABLET, FILM COATED ORAL at 09:13

## 2023-02-25 RX ADMIN — SODIUM CHLORIDE, PRESERVATIVE FREE 10 ML: 5 INJECTION INTRAVENOUS at 21:19

## 2023-02-25 RX ADMIN — GUAIFENESIN 200 MG: 100 SOLUTION ORAL at 21:57

## 2023-02-25 RX ADMIN — FUROSEMIDE 40 MG: 40 TABLET ORAL at 09:10

## 2023-02-25 RX ADMIN — CARVEDILOL 12.5 MG: 6.25 TABLET, FILM COATED ORAL at 09:10

## 2023-02-25 RX ADMIN — PROCHLORPERAZINE EDISYLATE 5 MG: 5 INJECTION INTRAMUSCULAR; INTRAVENOUS at 20:30

## 2023-02-25 RX ADMIN — ISOSORBIDE DINITRATE 30 MG: 20 TABLET ORAL at 09:10

## 2023-02-25 RX ADMIN — CEFTRIAXONE SODIUM 1000 MG: 1 INJECTION, POWDER, FOR SOLUTION INTRAMUSCULAR; INTRAVENOUS at 09:15

## 2023-02-25 NOTE — PROGRESS NOTES
COUMADIN PK EVALUATION - CONSULTED    Wt Readings from Last 3 Encounters:   02/24/23 214 lb (97.1 kg)   02/01/23 214 lb (97.1 kg)   01/16/23 214 lb (97.1 kg)     Recent Labs     02/24/23  1216 02/24/23  1748   INR  --  1.59   HGB 11.4*  --    HCT 37.1*  --    *  --        DOSING PLAN:  INR 1.59. Records in Epic reviewed, last notations from providers and pharmacy indicates patient takes Warfarin 5mg daily. At this time, unable to verify this home dose. RN is attempting to contact family and more information should be available in the morning. Will order Warfarin 5mg x 1 for INR of 1.59 tonight. Will repeat INR with morning labs. Carle Scheuermann, R. Ph.  _______________________________________________________________________

## 2023-02-25 NOTE — CONSULTS
PHARMACY ANTICOAGULATION MONITORING SERVICE    Ruthie He is a 80 y.o. male on warfarin therapy for Afib. Pharmacy consulted by Dr. Polina Leong for monitoring and adjustment of treatment. Indication for anticoagulation: AFib  INR goal: 2-3  Warfarin dose prior to admission: 5 mg daily    Pertinent Laboratory Values   Recent Labs     02/24/23  1216 02/24/23  1748 02/25/23  0539   INR  --    < > 1.54   HGB 11.4*  --  10.0*   HCT 37.1*  --  33.2*   *  --  114*    < > = values in this interval not displayed. Assessment/Plan:  Drug Interactions: Ceftriaxone, prn acetaminophen  INR 1.54  Will continue warfarin 5 mg daily  Pharmacy will continue to monitor and adjust warfarin therapy as indicated    Thank you for the consult.   Neda Esrtada, [facility], [address]  2/25/2023 2:26 PM

## 2023-02-25 NOTE — CONSULTS
Nephrology Service Consultation    Patient:  Kylie Faria  MRN: [id number]  Consulting physician:  Yaniv Gordon MD  Reason for Consult: arf    History Obtained From:  patient, electronic medical record  PCP: Lashae Mcwilliams MD    HISTORY OF PRESENT ILLNESS:   The patient is a 80 y.o. male who presents with hx htn, paf, chf systolic, ckd 3 known to me, legally blind with hx uti. Had recent cysto and then UTI presnt with nausea and poor intake with arf on ckd with confusion. Last ef 20-25% on entresto and on coumadin for a fib. And urine cult + morganella base creat 2.2 with creat now to 2.7. saw in jan 2023 and known ckd 3 after move from [place]. Last gfr 27. Past Medical History:        Diagnosis Date    Chronic kidney disease     Heart abnormality     Hypertension     UTI (urinary tract infection)        Past Surgical History:    History reviewed. No pertinent surgical history. Medications:   Scheduled Meds:   warfarin  5 mg Oral Daily    sodium chloride flush  5-40 mL IntraVENous 2 times per day    cefTRIAXone (ROCEPHIN) IV  1,000 mg IntraVENous Q24H    doxazosin  1 mg Oral Nightly    [Held by provider] sacubitril-valsartan  1 tablet Oral BID    isosorbide dinitrate  30 mg Oral Daily    [Held by provider] furosemide  40 mg Oral Every Other Day    carvedilol  12.5 mg Oral BID     warfarin placeholder: dosing by pharmacy   Other RX Placeholder     Continuous Infusions:   sodium chloride 25 mL (02/25/23 0913)     PRN Meds:.sodium chloride, acetaminophen **OR** acetaminophen    Allergies:  Patient has no known allergies. Social History:   TOBACCO:   reports that he has never smoked. He has never used smokeless tobacco.  ETOH:   reports no history of alcohol use.   OCCUPATION:      Family History:       Problem Relation Age of Onset    Kidney Disease Mother     Hypertension Mother     Diabetes Mother     Diabetes Granddaughter     Hypertension Daughter     Cancer Daughter        REVIEW OF SYSTEMS:  Negative except for weak anxious uti with n/v.    Physical Exam:    I/O: 02/24 0701 - 02/25 0700  In: -   Out: 400 [Urine:400]    Vitals: BP (!) 108/55   Pulse 97   Temp 97.9 °F (36.6 °C) (Oral)   Resp 16   Ht 6' 4\" (1.93 m)   Wt 214 lb (97.1 kg)   SpO2 99%   BMI 26.05 kg/m²   General appearance: awake weak  HEENT: Head: Normal, normocephalic, atraumatic. Neck: supple, symmetrical, trachea midline  Lungs: diminished breath sounds bilaterally  Heart: S1, S2 normal  Abdomen: abnormal findings:  soft NT  Extremities: edema trace  Neurologic: Mental status: alertness: awake tired nausea      CBC:   Recent Labs     02/24/23  1216 02/25/23  0539   WBC 24.3* 21.4*   HGB 11.4* 10.0*   * 114*     BMP:    Recent Labs     02/24/23  1216 02/25/23  0539    137   K 4.7 4.0   CL 98* 99   CO2 29 29   BUN 39* 42*   CREATININE 2.5* 2.7*   GLUCOSE 120* 87     Hepatic:   Recent Labs     02/24/23  1216 02/25/23  0539   AST 18 25   ALT 6* 6*   BILITOT 1.3* 0.5   ALKPHOS 72 66     Troponin: No results for input(s): TROPONINI in the last 72 hours. BNP: No results for input(s): BNP in the last 72 hours. Lipids: No results for input(s): CHOL, HDL in the last 72 hours.     Invalid input(s): LDLCALCU  ABGs: No results found for: PHART, PO2ART, [id number]  INR:   Recent Labs     02/24/23  1748 02/25/23  0539   INR 1.59 1.54     Renal Labs  Albumin:    Lab Results   Component Value Date/Time    LABALBU 3.2 02/25/2023 05:39 AM     Calcium:    Lab Results   Component Value Date/Time    CALCIUM 7.1 02/25/2023 05:39 AM     Phosphorus:    Lab Results   Component Value Date/Time    PHOS 3.0 01/13/2023 02:42 PM     U/A:    Lab Results   Component Value Date/Time    NITRU NEGATIVE 02/24/2023 01:00 PM    COLORU YELLOW 02/24/2023 01:00 PM    WBCUA 270 02/24/2023 01:00 PM    RBCUA 13 02/24/2023 01:00 PM    MUCUS RARE 02/24/2023 01:00 PM    TRICHOMONAS NONE SEEN 02/24/2023 01:00 PM    BACTERIA MODERATE 02/24/2023 01:00 PM CLARITYU SLIGHTLY CLOUDY 02/24/2023 01:00 PM    SPECGRAV <1.005 02/24/2023 01:00 PM    UROBILINOGEN 1.0 02/24/2023 01:00 PM    BILIRUBINUR NEGATIVE 02/24/2023 01:00 PM    BLOODU SMALL NUMBER OR AMOUNT OBSERVED 02/24/2023 01:00 PM    KETUA NEGATIVE 02/24/2023 01:00 PM     ABG:  No results found for: PHART, VJI2XLP, PO2ART, JZQ1BYI, BEART, THGBART, JLD8QLU, F1WASIKE  HgBA1c:  No results found for: LABA1C  Microalbumen/Creatinine ratio:  No components found for: RUCREAT  TSH:  No results found for: TSH  IRON:  No results found for: IRON  Iron Saturation:  No components found for: PERCENTFE  TIBC:  No results found for: TIBC  FERRITIN:  No results found for: FERRITIN  RPR:  No results found for: RPR  ADRIAN:  No results found for: ANATITER, ADRIAN  24 Hour Urine for Creatinine Clearance:  No components found for: CREAT4, UHRS10, UTV10  -----------------------------------------------------------------      Assessment and Recommendations     Patient Active Problem List   Diagnosis Code    BPH (benign prostatic hyperplasia) N40.0    Hypertension I10    LBBB (left bundle branch block) I44.7    SOB (shortness of breath) R06.02    Paroxysmal atrial fibrillation (HCC) I48.0    Acute on chronic diastolic congestive heart failure (HCC) I50.33    Stage 3a chronic kidney disease (HCC) N18.31    Legally blind H54.8    Precordial pain R07.2    CKD (chronic kidney disease) stage 4, 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

## 2023-02-25 NOTE — PROGRESS NOTES
Physician Progress Note      PATIENTStarchristie Boone  [facility] #:                  [id number]  :                       1935  ADMIT DATE:       2023 10:56 AM  DISCH DATE:  RESPONDING  PROVIDER #:        Bravo Otto MD          QUERY TEXT:    Hospitalists,    Pt admitted with  severe sepsis due to cystitis and has CHF documented. If   possible, please document in progress notes and discharge summary further   specificity regarding the type and acuity of CHF:    The medical record reflects the following:  Risk Factors: atrial fib, ischemic cardiomyopathy  Clinical Indicators: Per H&P: \"CHF #Ischemic CM--EF 20-25%,  w/ wall motion   abnormalities  and stress test w large anteriro septal defect  Treatment: labs, imaging, Entresto, BB, Imdur and furosemide    Thank you,  Julian Olson RN CDS  [phone]  Options provided:  -- Acute on Chronic Systolic CHF/HFrEF  -- Acute on Chronic Diastolic CHF/HFpEF  -- Acute on Chronic Systolic and Diastolic CHF  -- Acute Systolic CHF/HFrEF  -- Acute Diastolic CHF/HFpEF  -- Acute Systolic and Diastolic CHF  -- Chronic Systolic CHF/HFrEF  -- Chronic Diastolic CHF/HFpEF  -- Chronic Systolic and Diastolic CHF  -- Other - I will add my own diagnosis  -- Disagree - Not applicable / Not valid  -- Disagree - Clinically unable to determine / Unknown  -- Refer to Clinical Documentation Reviewer    PROVIDER RESPONSE TEXT:    This patient has chronic systolic and diastolic CHF.     Query created by: Brent Villa on 2023 10:32 AM      Electronically signed by:  Bravo Otto MD 2023 10:50 AM

## 2023-02-25 NOTE — PROGRESS NOTES
V2.0  [facility] [place] Hospitalist Progress Note      Name:  Diana Zamora /Age/Sex: 1935  (80 y.o. male)   MRN & CSN:  [id number] & [id number] Encounter Date/Time: 2023 7:25 AM EST    Location:  -A PCP: Ramana Gray MD       Hospital Day: 2    Assessment and Plan:   Diana Zamora is a 80 y.o. male with pmh of hypertension, paroxysmal atrial fibrillation, chronic HFrEF, chronic respiratory failure on 2-3 L of O2 on and off, CKD stage III a and legally blind who presents with Severe sepsis ([facility] [address]      # Acute encephalopathy suspected secondary to UTI -resolved  # Severe sepsis secondary to UTI - improving  # Complicated UTI  # Intractable nausea and vomiting - improving  -Patient presented with history of intractable vomiting and altered mental status, patient had a cystoscopy a week ago, developed urinary symptoms frequency and urgency, on presentation UA positive for leukocyte esterase, hematuria, pyuria and bacteriuria, SIRS 2/4 for tachycardia and leukocytosis with suspected UTI and and altered mental status, creatinine greater than 2 minutes criteria for severe sepsis, patient did not get sepsis bolus due to low EF, obtained blood and urine cultures, started on IV ceftriaxone, CT head negative for intracranial abnormalities, continue delirium precautions, avoid meds that affect mentation, follow-up on culture results and de-escalate antibiotics. # JESSICA on CKD stage III A: Baseline creatinine 2.1-2.2, creatinine on presentation 2.5 -->2.7, avoid nephrotoxins, NSAIDs, obtain bladder scan, ultrasound renal, consult nephrology, will continue to monitor. # Elevated troponin: Suspected to renal, trended down. # Hypertension: Was on Coreg 12.5 mg twice daily, Entresto 49-51 mg twice daily, Isordil 30 mg daily, Cardura 1 mg at night, Lasix 40 mg every other day, will hold Entresto and Lasix due to JESSICA.     # Chronic systolic/diastolic heart failure and ischemic cardiomyopathy: Last echo  EF 20 to 25% with wall motion abnormalities and grade 2 diastolic dysfunction and stress test with large anterior septal defect, was on Entresto, Coreg, Imdur and Lasix, hold Entresto and Lasix due to JESSICA, restart when appropriate.    # Paroxysmal A-fib on Coumadin and rate control continue    # Legally blind continue fall precautions        Diet ADULT DIET; Regular; Low Fat/Low Chol/High Fiber/PRISCILLA   DVT Prophylaxis [] Lovenox, []  Heparin, [] SCDs, [] Ambulation,  [] Eliquis, [] Xarelto  [x] Coumadin   Code Status Full Code   Disposition From: Home with daughter  Expected Disposition: Home  Estimated Date of Discharge: TBD  Patient requires continued admission due to work-up and consultation   Surrogate Decision Maker/ POA Daughter     Subjective:     Chief Complaint: Emesis and Fatigue       Patient seen and examined at bedside.  Patient stated he still have some urinary symptoms, but denies any vomiting this morning, denies any fever, chills or abdominal pain, patient is oriented x4 this morning.    Of note: Per patient he states he recently moved from [place] and lives with her daughter at home.  Called patient's daughter at [phone] Ms. Rodriguez and updated about patient condition         Review of Systems:    Review of Systems    As above    Objective:     Intake/Output Summary (Last 24 hours) at 2/25/2023 0725  Last data filed at 2/25/2023 0502  Gross per 24 hour   Intake --   Output 400 ml   Net -400 ml        Vitals:   Vitals:    02/25/23 0502   BP: 124/64   Pulse: 94   Resp: 21   Temp: 98 °F (36.7 °C)   SpO2: 99%       Physical Exam:     General: Afebrile, no distress on 3 L of O2  Eyes: EOMI, legally blind  ENT: neck supple, no JVD  Cardiovascular: S1-S2 normal no murmur  Respiratory: Clear to auscultation  Gastrointestinal: Soft, non tender  Genitourinary:  mild suprapubic tenderness  Musculoskeletal: No edema  Skin: warm, dry  Neuro: Alert.  Oriented no focal deficit  Psych: Mood appropriate.     Medications:  Medications:    sodium chloride flush  5-40 mL IntraVENous 2 times per day    cefTRIAXone (ROCEPHIN) IV  1,000 mg IntraVENous Q24H    doxazosin  1 mg Oral Nightly    sacubitril-valsartan  1 tablet Oral BID    isosorbide dinitrate  30 mg Oral Daily    furosemide  40 mg Oral Every Other Day    carvedilol  12.5 mg Oral BID WC    warfarin placeholder: dosing by pharmacy   Other RX Placeholder      Infusions:    sodium chloride       PRN Meds: sodium chloride, , PRN  acetaminophen, 650 mg, Q6H PRN   Or  acetaminophen, 650 mg, Q6H PRN        Labs      Recent Results (from the past 24 hour(s))   Blood Gas, Venous    Collection Time: 02/24/23 12:00 PM   Result Value Ref Range    pH, Trav 7.41 7.32 - 7.42    pCO2, Trav 49 38 - 52 mmHG    pO2, Trav 179 (H) 28 - 48 mmHG    Base Exc, Mixed 5.3 (H) 0 - 1.2    HCO3, Venous 31.1 (H) 19 - 25 MMOL/L    O2 Sat, Trav 94.8 (H) 50 - 70 %    Comment VBG    CBC with Auto Differential    Collection Time: 02/24/23 12:16 PM   Result Value Ref Range    WBC 24.3 (H) 4.0 - 10.5 K/CU MM    RBC 4.02 (L) 4.6 - 6.2 M/CU MM    Hemoglobin 11.4 (L) 13.5 - 18.0 GM/DL    Hematocrit 37.1 (L) 42 - 52 %    MCV 92.3 78 - 100 FL    MCH 28.4 27 - 31 PG    MCHC 30.7 (L) 32.0 - 36.0 %    RDW 14.7 11.7 - 14.9 %    Platelets 683 (L) 702 - 440 K/CU MM    MPV 10.0 7.5 - 11.1 FL    Differential Type AUTOMATED DIFFERENTIAL     Segs Relative 81.7 (H) 36 - 66 %    Lymphocytes % 6.2 (L) 24 - 44 %    Monocytes % 10.3 (H) 0 - 4 %    Eosinophils % 0.0 0 - 3 %    Basophils % 0.2 0 - 1 %    Segs Absolute 19.8 K/CU MM    Lymphocytes Absolute 1.5 K/CU MM    Monocytes Absolute 2.5 K/CU MM    Eosinophils Absolute 0.0 K/CU MM    Basophils Absolute 0.1 K/CU MM    Nucleated RBC % 0.0 %    Total Nucleated RBC 0.0 K/CU MM    Total Immature Neutrophil 0.39 K/CU MM    Immature Neutrophil % 1.6 (H) 0 - 0.43 %   Comprehensive Metabolic Panel    Collection Time: 02/24/23 12:16 PM   Result Value Ref Range    Sodium 138 135 - 145 MMOL/L Potassium 4.7 3.5 - 5.1 MMOL/L    Chloride 98 (L) 99 - 110 mMol/L    CO2 29 21 - 32 MMOL/L    BUN 39 (H) 6 - 23 MG/DL    Creatinine 2.5 (H) 0.9 - 1.3 MG/DL    Est, Glom Filt Rate 24 (L) >60 mL/min/1.73m2    Glucose 120 (H) 70 - 99 MG/DL    Calcium 7.5 (L) 8.3 - 10.6 MG/DL    Albumin 3.9 3.4 - 5.0 GM/DL    Total Protein 6.5 6.4 - 8.2 GM/DL    Total Bilirubin 1.3 (H) 0.0 - 1.0 MG/DL    ALT 6 (L) 10 - 40 U/L    AST 18 15 - 37 IU/L    Alkaline Phosphatase 72 40 - 129 IU/L    Anion Gap 11 4 - 16   Lactate, Sepsis    Collection Time: 02/24/23 12:16 PM   Result Value Ref Range    Lactic Acid, Sepsis 1.6 0.5 - 1.9 mMOL/L   Troponin    Collection Time: 02/24/23 12:16 PM   Result Value Ref Range    Troponin T 0.017 (H) <0.01 NG/ML   Urinalysis    Collection Time: 02/24/23  1:00 PM   Result Value Ref Range    Color, UA YELLOW YELLOW    Clarity, UA SLIGHTLY CLOUDY (A) CLEAR    Glucose, Urine NEGATIVE NEGATIVE MG/DL    Bilirubin Urine NEGATIVE NEGATIVE MG/DL    Ketones, Urine NEGATIVE NEGATIVE MG/DL    Specific Gravity, UA <1.005 1.001 - 1.035    Blood, Urine SMALL NUMBER OR AMOUNT OBSERVED (A) NEGATIVE    pH, Urine 6.5 5.0 - 8.0    Protein, UA TRACE (A) NEGATIVE MG/DL    Urobilinogen, Urine 1.0 0.2 - 1.0 MG/DL    Nitrite Urine, Quantitative NEGATIVE NEGATIVE    Leukocyte Esterase, Urine LARGE NUMBER OR AMOUNT OBSERVED (A) NEGATIVE   Microscopic Urinalysis    Collection Time: 02/24/23  1:00 PM   Result Value Ref Range    RBC, UA 13 (H) 0 - 3 /HPF    WBC,  (H) 0 - 2 /HPF    Bacteria, UA MODERATE (A) NEGATIVE /HPF    WBC Clumps, UA RARE /HPF    Squam Epithel, UA 1 /HPF    Mucus, UA RARE (A) NEGATIVE HPF    Trichomonas, UA NONE SEEN NONE SEEN /HPF   EKG 12 Lead    Collection Time: 02/24/23  1:01 PM   Result Value Ref Range    Ventricular Rate 93 BPM    Atrial Rate 93 BPM    P-R Interval 198 ms    QRS Duration 164 ms    Q-T Interval 416 ms    QTc Calculation (Bazett) 517 ms    P Axis 90 degrees    R Axis -31 degrees    T Axis 183 degrees    Diagnosis       Sinus rhythm with premature atrial complexes  Left axis deviation  Left bundle branch block  Abnormal ECG  No previous ECGs available  Confirmed by Aleta Mcfarlane ([id number]) on 2/24/2023 8:25:57 PM     Troponin    Collection Time: 02/24/23  5:48 PM   Result Value Ref Range    Troponin T 0.017 (H) <0.01 NG/ML   Protime-INR    Collection Time: 02/24/23  5:48 PM   Result Value Ref Range    Protime 20.6 (H) 11.7 - 14.5 SECONDS    INR 1.59 INDEX   Procalcitonin    Collection Time: 02/25/23  5:39 AM   Result Value Ref Range    Procalcitonin 0.576    CBC with Auto Differential    Collection Time: 02/25/23  5:39 AM   Result Value Ref Range    WBC 21.4 (H) 4.0 - 10.5 K/CU MM    RBC 3.56 (L) 4.6 - 6.2 M/CU MM    Hemoglobin 10.0 (L) 13.5 - 18.0 GM/DL    Hematocrit 33.2 (L) 42 - 52 %    MCV 93.3 78 - 100 FL    MCH 28.1 27 - 31 PG    MCHC 30.1 (L) 32.0 - 36.0 %    RDW 14.6 11.7 - 14.9 %    Platelets 325 (L) 833 - 440 K/CU MM    MPV 10.3 7.5 - 11.1 FL    Differential Type AUTOMATED DIFFERENTIAL     Segs Relative 77.3 (H) 36 - 66 %    Lymphocytes % 11.4 (L) 24 - 44 %    Monocytes % 10.2 (H) 0 - 4 %    Eosinophils % 0.2 0 - 3 %    Basophils % 0.2 0 - 1 %    Segs Absolute 16.5 K/CU MM    Lymphocytes Absolute 2.4 K/CU MM    Monocytes Absolute 2.2 K/CU MM    Eosinophils Absolute 0.0 K/CU MM    Basophils Absolute 0.1 K/CU MM    Nucleated RBC % 0.0 %    Total Nucleated RBC 0.0 K/CU MM    Total Immature Neutrophil 0.16 K/CU MM    Immature Neutrophil % 0.7 (H) 0 - 0.43 %   Protime-INR    Collection Time: 02/25/23  5:39 AM   Result Value Ref Range    Protime 19.9 (H) 11.7 - 14.5 SECONDS    INR 1.54 INDEX        Imaging/Diagnostics Last 24 Hours   CT HEAD WO CONTRAST    Result Date: 2/24/2023  EXAMINATION: CT OF THE HEAD WITHOUT CONTRAST  2/24/2023 12:22 pm TECHNIQUE: CT of the head was performed without the administration of intravenous contrast. Automated exposure control, iterative reconstruction, and/or weight based adjustment of the mA/kV was utilized to reduce the radiation dose to as low as reasonably achievable. COMPARISON: None. HISTORY: ORDERING SYSTEM PROVIDED HISTORY: Confusion FINDINGS: BRAIN/VENTRICLES:  No evidence of an acute infarct or other acute parenchymal process. No evidence of acute intracranial hemorrhage. There is no evidence of an intracranial mass or extraaxial fluid collection. No significant mass effect or midline shift. There is mild generalized volume loss. Ventricular enlargement concordant with the degree of parenchymal volume loss. Scattered patchy foci of low attenuation are present within supratentorial white matter which is a nonspecific finding but likely represents mild chronic microvascular ischemia. Remote lacunar infarcts throughout the bilateral basal ganglia. ORBITS: The visualized portion of the orbits demonstrate no acute abnormality. Right scleral banding. Left lens extraction. SINUSES:  The visualized paranasal sinuses and mastoid air cells demonstrate no acute abnormality. SOFT TISSUES/SKULL: No acute abnormality of the visualized skull or soft tissues. No acute intracranial abnormality. XR CHEST PORTABLE    Result Date: 2/24/2023  EXAMINATION: ONE XRAY VIEW OF THE CHEST 2/24/2023 12:08 pm COMPARISON: 1/31/2023, 9/7/2022 HISTORY: ORDERING SYSTEM PROVIDED HISTORY: Confusion TECHNOLOGIST PROVIDED HISTORY: Reason for exam:->Confusion Reason for Exam: Confusion FINDINGS: The cardiomediastinal silhouette is unchanged. Bibasilar streaky opacities persist, favoring scarring or atelectasis. No new focal consolidation, pneumothorax, or pleural effusion. Osseous structures are diffusely demineralized and grossly unchanged. No acute process.        Electronically signed by René Loya MD on 2/25/2023 at 7:25 AM

## 2023-02-25 NOTE — PROGRESS NOTES
Patient seen and examined full note to follow now taking good p.o. intake initially presents with nausea vomiting poor intake overall noted of acute on chronic kidney failure renal ultrasound is pending agree with current treatment plan and medical management and monitor closely.

## 2023-02-26 LAB
ALBUMIN SERPL-MCNC: 3.1 GM/DL (ref 3.4–5)
ALP BLD-CCNC: 64 IU/L (ref 40–128)
ALT SERPL-CCNC: 7 U/L (ref 10–40)
ANION GAP SERPL CALCULATED.3IONS-SCNC: 10 MMOL/L (ref 4–16)
AST SERPL-CCNC: 24 IU/L (ref 15–37)
BASOPHILS ABSOLUTE: 0 K/CU MM
BASOPHILS RELATIVE PERCENT: 0.3 % (ref 0–1)
BILIRUB SERPL-MCNC: 0.4 MG/DL (ref 0–1)
BUN SERPL-MCNC: 45 MG/DL (ref 6–23)
CALCIUM SERPL-MCNC: 6.8 MG/DL (ref 8.3–10.6)
CHLORIDE BLD-SCNC: 100 MMOL/L (ref 99–110)
CO2: 29 MMOL/L (ref 21–32)
CREAT SERPL-MCNC: 2.6 MG/DL (ref 0.9–1.3)
CULTURE: ABNORMAL
CULTURE: ABNORMAL
DIFFERENTIAL TYPE: ABNORMAL
EOSINOPHILS ABSOLUTE: 0.1 K/CU MM
EOSINOPHILS RELATIVE PERCENT: 0.5 % (ref 0–3)
GFR SERPL CREATININE-BSD FRML MDRD: 23 ML/MIN/1.73M2
GLUCOSE SERPL-MCNC: 120 MG/DL (ref 70–99)
HCT VFR BLD CALC: 33.2 % (ref 42–52)
HEMOGLOBIN: 9.9 GM/DL (ref 13.5–18)
IMMATURE NEUTROPHIL %: 0.5 % (ref 0–0.43)
INR BLD: 1.72 INDEX
LYMPHOCYTES ABSOLUTE: 1 K/CU MM
LYMPHOCYTES RELATIVE PERCENT: 8.6 % (ref 24–44)
Lab: ABNORMAL
MCH RBC QN AUTO: 27.8 PG (ref 27–31)
MCHC RBC AUTO-ENTMCNC: 29.8 % (ref 32–36)
MCV RBC AUTO: 93.3 FL (ref 78–100)
MONOCYTES ABSOLUTE: 0.9 K/CU MM
MONOCYTES RELATIVE PERCENT: 7.7 % (ref 0–4)
NUCLEATED RBC %: 0 %
PDW BLD-RTO: 14.6 % (ref 11.7–14.9)
PLATELET # BLD: 115 K/CU MM (ref 140–440)
PMV BLD AUTO: 10.7 FL (ref 7.5–11.1)
POTASSIUM SERPL-SCNC: 4 MMOL/L (ref 3.5–5.1)
PRO-BNP: ABNORMAL PG/ML
PROTHROMBIN TIME: 22.3 SECONDS (ref 11.7–14.5)
RBC # BLD: 3.56 M/CU MM (ref 4.6–6.2)
SEGMENTED NEUTROPHILS ABSOLUTE COUNT: 9.8 K/CU MM
SEGMENTED NEUTROPHILS RELATIVE PERCENT: 82.4 % (ref 36–66)
SODIUM BLD-SCNC: 139 MMOL/L (ref 135–145)
SPECIMEN: ABNORMAL
TOTAL IMMATURE NEUTOROPHIL: 0.06 K/CU MM
TOTAL NUCLEATED RBC: 0 K/CU MM
TOTAL PROTEIN: 5.5 GM/DL (ref 6.4–8.2)
WBC # BLD: 11.8 K/CU MM (ref 4–10.5)

## 2023-02-26 PROCEDURE — 6370000000 HC RX 637 (ALT 250 FOR IP): Performed by: FAMILY MEDICINE

## 2023-02-26 PROCEDURE — 36415 COLL VENOUS BLD VENIPUNCTURE: CPT

## 2023-02-26 PROCEDURE — 85025 COMPLETE CBC W/AUTO DIFF WBC: CPT

## 2023-02-26 PROCEDURE — 6360000002 HC RX W HCPCS: Performed by: FAMILY MEDICINE

## 2023-02-26 PROCEDURE — 1200000000 HC SEMI PRIVATE

## 2023-02-26 PROCEDURE — 6370000000 HC RX 637 (ALT 250 FOR IP): Performed by: STUDENT IN AN ORGANIZED HEALTH CARE EDUCATION/TRAINING PROGRAM

## 2023-02-26 PROCEDURE — 6360000002 HC RX W HCPCS: Performed by: STUDENT IN AN ORGANIZED HEALTH CARE EDUCATION/TRAINING PROGRAM

## 2023-02-26 PROCEDURE — 85610 PROTHROMBIN TIME: CPT

## 2023-02-26 PROCEDURE — 2580000003 HC RX 258: Performed by: STUDENT IN AN ORGANIZED HEALTH CARE EDUCATION/TRAINING PROGRAM

## 2023-02-26 PROCEDURE — 80053 COMPREHEN METABOLIC PANEL: CPT

## 2023-02-26 PROCEDURE — 6360000002 HC RX W HCPCS

## 2023-02-26 PROCEDURE — 83880 ASSAY OF NATRIURETIC PEPTIDE: CPT

## 2023-02-26 PROCEDURE — 2580000003 HC RX 258

## 2023-02-26 RX ORDER — LANOLIN ALCOHOL/MO/W.PET/CERES
6 CREAM (GRAM) TOPICAL ONCE
Status: COMPLETED | OUTPATIENT
Start: 2023-02-26 | End: 2023-02-26

## 2023-02-26 RX ORDER — FAMOTIDINE 20 MG/1
40 TABLET, FILM COATED ORAL 2 TIMES DAILY
Status: DISCONTINUED | OUTPATIENT
Start: 2023-02-26 | End: 2023-02-26 | Stop reason: DRUGHIGH

## 2023-02-26 RX ORDER — CALCIUM GLUCONATE 20 MG/ML
2000 INJECTION, SOLUTION INTRAVENOUS ONCE
Status: COMPLETED | OUTPATIENT
Start: 2023-02-26 | End: 2023-02-26

## 2023-02-26 RX ORDER — LANOLIN ALCOHOL/MO/W.PET/CERES
6 CREAM (GRAM) TOPICAL NIGHTLY PRN
Status: DISCONTINUED | OUTPATIENT
Start: 2023-02-26 | End: 2023-03-01 | Stop reason: HOSPADM

## 2023-02-26 RX ORDER — FAMOTIDINE 20 MG/1
20 TABLET, FILM COATED ORAL NIGHTLY
Status: DISCONTINUED | OUTPATIENT
Start: 2023-02-26 | End: 2023-03-01 | Stop reason: HOSPADM

## 2023-02-26 RX ORDER — CALCIUM CARBONATE 200(500)MG
500 TABLET,CHEWABLE ORAL 3 TIMES DAILY PRN
Status: DISCONTINUED | OUTPATIENT
Start: 2023-02-26 | End: 2023-03-01 | Stop reason: HOSPADM

## 2023-02-26 RX ADMIN — FAMOTIDINE 20 MG: 20 TABLET ORAL at 20:15

## 2023-02-26 RX ADMIN — PIPERACILLIN AND TAZOBACTAM 2250 MG: 2; .25 INJECTION, POWDER, FOR SOLUTION INTRAVENOUS at 16:43

## 2023-02-26 RX ADMIN — CEFTRIAXONE SODIUM 1000 MG: 1 INJECTION, POWDER, FOR SOLUTION INTRAMUSCULAR; INTRAVENOUS at 09:23

## 2023-02-26 RX ADMIN — SODIUM CHLORIDE, PRESERVATIVE FREE 10 ML: 5 INJECTION INTRAVENOUS at 09:22

## 2023-02-26 RX ADMIN — WARFARIN SODIUM 5 MG: 5 TABLET ORAL at 16:39

## 2023-02-26 RX ADMIN — PROCHLORPERAZINE EDISYLATE 5 MG: 5 INJECTION INTRAMUSCULAR; INTRAVENOUS at 16:38

## 2023-02-26 RX ADMIN — CALCIUM GLUCONATE 2000 MG: 20 INJECTION, SOLUTION INTRAVENOUS at 03:16

## 2023-02-26 RX ADMIN — DOXAZOSIN 1 MG: 1 TABLET ORAL at 20:15

## 2023-02-26 RX ADMIN — CARVEDILOL 12.5 MG: 6.25 TABLET, FILM COATED ORAL at 09:21

## 2023-02-26 RX ADMIN — Medication 6 MG: at 03:11

## 2023-02-26 RX ADMIN — ISOSORBIDE DINITRATE 30 MG: 20 TABLET ORAL at 09:21

## 2023-02-26 RX ADMIN — SODIUM CHLORIDE, PRESERVATIVE FREE 10 ML: 5 INJECTION INTRAVENOUS at 20:17

## 2023-02-26 RX ADMIN — PIPERACILLIN AND TAZOBACTAM 2250 MG: 2; .25 INJECTION, POWDER, FOR SOLUTION INTRAVENOUS at 20:22

## 2023-02-26 RX ADMIN — CALCIUM CARBONATE 500 MG: 500 TABLET, CHEWABLE ORAL at 21:06

## 2023-02-26 RX ADMIN — Medication 6 MG: at 20:15

## 2023-02-26 RX ADMIN — CARVEDILOL 12.5 MG: 6.25 TABLET, FILM COATED ORAL at 16:38

## 2023-02-26 ASSESSMENT — ENCOUNTER SYMPTOMS
EYE PAIN: 0
SINUS PAIN: 0
SINUS PRESSURE: 0
VOMITING: 0
DIARRHEA: 0
NAUSEA: 0
CHEST TIGHTNESS: 0
VOICE CHANGE: 0
EYE DISCHARGE: 0
BLOOD IN STOOL: 0
COUGH: 0
BACK PAIN: 1
SHORTNESS OF BREATH: 0
ABDOMINAL PAIN: 0

## 2023-02-26 NOTE — PROGRESS NOTES
V2.0  [facility] [place]      Name:  Ruthie He /Age/Sex: 1935  (80 y.o. male)   MRN & CSN:  [id number] & [id number] Encounter Date/Time: 2023 12:16 PM EST    Location:  -A PCP: Vinh Del Cid MD       Hospital Day: 3    Assessment and Plan:   Ruthie He is a 80 y.o. male with pmh of hypertension, paroxysmal atrial fibrillation, chronic HFrEF, chronic respiratory failure on 2-3 L of O2 on and off, CKD stage III a and legally blind who presents with Severe sepsis ([facility] [address]    Plan:  Severe sepsis with acute encephalopathy in the setting of suspected UTI with intractable nausea and vomiting: UA positive for leukocyte esterase hematuria pyuria and bacteriuria. Blood cultures were drawn in which 1 out of 4 bottles are showing Enterobacter species. Urine culture shows Morganella. Await sensitivities and continue antibiotics. Change antibiotic based on sensitivity results. Symptoms are improving significantly. Was on IV antibiotics Rocephin, changed to zosyn IV. Delirium precautions. De-escalate antibiotics based on the results of the cultures and sensitivities. Continue to monitor  COPD with suspected 2 to 3 L oxygen at home? Patient states patient uses oxygen at home? Documentations from the charts showed that the patient did not qualify in the beginning? Unsure regarding that. Will confirm with  tomorrow  JESSICA on CKD stage III AA: Baseline creatinine of around 2.1. Most recent creatinine is 2.6. Avoid nephrotoxic agents continue to monitor. Nephrology on board  Elevated troponin likely demand ischemia in the setting of underlying CKD stage IIIa  Benign essential hypertension, home medication included Coreg 12.5 mg twice daily, Entresto Isordil 30 mg daily Cardura 1 mg at night and Lasix 40 mg every other day. Currently holding Entresto and Lasix due to JESSICA.   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 Most recent stress test showed a large anterior septal defect was on Entresto Coreg Imdur and Lasix. Continue to monitor. Paroxysmal atrial fibrillation on Coumadin  Legally blind continue fall precautions. Diet ADULT DIET; Regular; Low Fat/Low Chol/High Fiber/PRISCILLA   DVT Prophylaxis [] Lovenox, []  Heparin, [] SCDs, [] Ambulation,  [] Eliquis, [] Xarelto  [] Coumadin   Code Status Full Code   Disposition From: Home  Expected Disposition: Likely ECF versus home, patient is preferring home  Estimated Date of Discharge: 2 to 3 days  Patient requires continued admission due to cultures are pending patient is still weak needs IV antibiotics   Surrogate Decision Maker/ POA      Subjective:     Chief Complaint: Emesis and Fatigue     Patient was seen at the bedside patient is legally blind. All questions were answered at bedside. Explained why patient needs IV antibiotics and awaiting urine culture and blood culture results before de-escalation of antibiotics. No other acute questions and concerns. Review of Systems:    Review of Systems   Constitutional:  Positive for activity change and appetite change. Negative for chills, fever and unexpected weight change. HENT:  Negative for ear pain, hearing loss, sinus pressure, sinus pain and voice change. Eyes:  Positive for visual disturbance. Negative for pain and discharge. Respiratory:  Negative for cough, chest tightness and shortness of breath. Cardiovascular:  Negative for chest pain, palpitations and leg swelling. Gastrointestinal:  Negative for abdominal pain, blood in stool, diarrhea, nausea and vomiting. Genitourinary:  Negative for dysuria and frequency. Musculoskeletal:  Positive for arthralgias and back pain. Neurological:  Positive for weakness.  Negative for dizziness, light-headedness and headaches.   Psychiatric/Behavioral:  Positive for decreased concentration and sleep disturbance.      Objective:     Intake/Output Summary (Last 24 hours) at 2/26/2023 1216  Last data filed at 2/26/2023 0934  Gross per 24 hour   Intake 480 ml   Output 375 ml   Net 105 ml        Vitals:   Vitals:    02/26/23 1130   BP: 114/88   Pulse: 89   Resp: 20   Temp: 97.3 °F (36.3 °C)   SpO2: 99%       Physical Exam:   Physical Exam  Vitals reviewed.   Constitutional:       Appearance: Normal appearance. He is normal weight. He is ill-appearing.   HENT:      Head: Normocephalic.      Right Ear: Tympanic membrane normal.      Left Ear: Tympanic membrane normal.      Nose: Nose normal.      Mouth/Throat:      Mouth: Mucous membranes are moist.   Eyes:      Conjunctiva/sclera: Conjunctivae normal.      Pupils: Pupils are equal, round, and reactive to light.   Cardiovascular:      Rate and Rhythm: Regular rhythm. Tachycardia present.      Pulses: Normal pulses.      Heart sounds: Normal heart sounds. No murmur heard.  Pulmonary:      Effort: Pulmonary effort is normal.      Breath sounds: Normal breath sounds. No wheezing, rhonchi or rales.   Abdominal:      General: Abdomen is flat. Bowel sounds are normal. There is no distension.      Palpations: Abdomen is soft.      Tenderness: There is no abdominal tenderness.   Musculoskeletal:         General: No deformity. Normal range of motion.      Cervical back: Normal range of motion and neck supple.      Right lower leg: Edema present.      Left lower leg: Edema present.   Skin:     General: Skin is dry.      Coloration: Skin is not jaundiced or pale.   Neurological:      General: No focal deficit present.      Mental Status: He is alert and oriented to person, place, and time. Mental status is at baseline.      Motor: Weakness present.   Psychiatric:         Mood and Affect: Mood normal.         Behavior: Behavior normal.          Medications:   Medications:    warfarin  5 mg Oral  Daily    sodium chloride flush  5-40 mL IntraVENous 2 times per day    cefTRIAXone (ROCEPHIN) IV  1,000 mg IntraVENous Q24H    doxazosin  1 mg Oral Nightly    [Held by provider] sacubitril-valsartan  1 tablet Oral BID    isosorbide dinitrate  30 mg Oral Daily    [Held by provider] furosemide  40 mg Oral Every Other Day    carvedilol  12.5 mg Oral BID WC    warfarin placeholder: dosing by pharmacy   Other RX Placeholder      Infusions:    sodium chloride 25 mL (02/25/23 0913)     PRN Meds: prochlorperazine, 5 mg, Q6H PRN  prochlorperazine, 5 mg, Q6H PRN  guaiFENesin, 200 mg, Q4H PRN  sodium chloride, , PRN  acetaminophen, 650 mg, Q6H PRN   Or  acetaminophen, 650 mg, Q6H PRN        Labs      Recent Results (from the past 24 hour(s))   Comprehensive Metabolic Panel w/ Reflex to MG    Collection Time: 02/26/23 12:30 AM   Result Value Ref Range    Sodium 139 135 - 145 MMOL/L    Potassium 4.0 3.5 - 5.1 MMOL/L    Chloride 100 99 - 110 mMol/L    CO2 29 21 - 32 MMOL/L    BUN 45 (H) 6 - 23 MG/DL    Creatinine 2.6 (H) 0.9 - 1.3 MG/DL    Est, Glom Filt Rate 23 (L) >60 mL/min/1.73m2    Glucose 120 (H) 70 - 99 MG/DL    Calcium 6.8 (LL) 8.3 - 10.6 MG/DL    Albumin 3.1 (L) 3.4 - 5.0 GM/DL    Total Protein 5.5 (L) 6.4 - 8.2 GM/DL    Total Bilirubin 0.4 0.0 - 1.0 MG/DL    ALT 7 (L) 10 - 40 U/L    AST 24 15 - 37 IU/L    Alkaline Phosphatase 64 40 - 128 IU/L    Anion Gap 10 4 - 16   CBC with Auto Differential    Collection Time: 02/26/23 12:30 AM   Result Value Ref Range    WBC 11.8 (H) 4.0 - 10.5 K/CU MM    RBC 3.56 (L) 4.6 - 6.2 M/CU MM    Hemoglobin 9.9 (L) 13.5 - 18.0 GM/DL    Hematocrit 33.2 (L) 42 - 52 %    MCV 93.3 78 - 100 FL    MCH 27.8 27 - 31 PG    MCHC 29.8 (L) 32.0 - 36.0 %    RDW 14.6 11.7 - 14.9 %    Platelets 048 (L) 337 - 440 K/CU MM    MPV 10.7 7.5 - 11.1 FL    Differential Type AUTOMATED DIFFERENTIAL     Segs Relative 82.4 (H) 36 - 66 %    Lymphocytes % 8.6 (L) 24 - 44 %    Monocytes % 7.7 (H) 0 - 4 %    Eosinophils % 0.5 0 - 3 %    Basophils % 0.3 0 - 1 %    Segs Absolute 9.8 K/CU MM    Lymphocytes Absolute 1.0 K/CU MM    Monocytes Absolute 0.9 K/CU MM    Eosinophils Absolute 0.1 K/CU MM    Basophils Absolute 0.0 K/CU MM    Nucleated RBC % 0.0 %    Total Nucleated RBC 0.0 K/CU MM    Total Immature Neutrophil 0.06 K/CU MM    Immature Neutrophil % 0.5 (H) 0 - 0.43 %   Protime-INR    Collection Time: 02/26/23 12:30 AM   Result Value Ref Range    Protime 22.3 (H) 11.7 - 14.5 SECONDS    INR 1.72 INDEX   Brain Natriuretic Peptide    Collection Time: 02/26/23 12:30 AM   Result Value Ref Range    Pro-BNP 14,667 (H) <300 PG/ML        Imaging/Diagnostics Last 24 Hours   CT HEAD WO CONTRAST    Result Date: 2/24/2023  EXAMINATION: CT OF THE HEAD WITHOUT CONTRAST  2/24/2023 12:22 pm TECHNIQUE: CT of the head was performed without the administration of intravenous contrast. Automated exposure control, iterative reconstruction, and/or weight based adjustment of the mA/kV was utilized to reduce the radiation dose to as low as reasonably achievable. COMPARISON: None. HISTORY: ORDERING SYSTEM PROVIDED HISTORY: Confusion FINDINGS: BRAIN/VENTRICLES:  No evidence of an acute infarct or other acute parenchymal process. No evidence of acute intracranial hemorrhage. There is no evidence of an intracranial mass or extraaxial fluid collection. No significant mass effect or midline shift. There is mild generalized volume loss. Ventricular enlargement concordant with the degree of parenchymal volume loss. Scattered patchy foci of low attenuation are present within supratentorial white matter which is a nonspecific finding but likely represents mild chronic microvascular ischemia. Remote lacunar infarcts throughout the bilateral basal ganglia. ORBITS: The visualized portion of the orbits demonstrate no acute abnormality. Right scleral banding. Left lens extraction.  SINUSES:  The visualized paranasal sinuses and mastoid air cells demonstrate no acute abnormality. SOFT TISSUES/SKULL: No acute abnormality of the visualized skull or soft tissues. No acute intracranial abnormality. XR CHEST PORTABLE    Result Date: 2/24/2023  EXAMINATION: ONE XRAY VIEW OF THE CHEST 2/24/2023 12:08 pm COMPARISON: 1/31/2023, 9/7/2022 HISTORY: ORDERING SYSTEM PROVIDED HISTORY: Confusion TECHNOLOGIST PROVIDED HISTORY: Reason for exam:->Confusion Reason for Exam: Confusion FINDINGS: The cardiomediastinal silhouette is unchanged. Bibasilar streaky opacities persist, favoring scarring or atelectasis. No new focal consolidation, pneumothorax, or pleural effusion. Osseous structures are diffusely demineralized and grossly unchanged. No acute process. US RETROPERITONEAL LIMITED    Result Date: 2/25/2023  EXAMINATION: ULTRASOUND OF THE KIDNEYS 2/25/2023 11:20 am COMPARISON: None. HISTORY: ORDERING SYSTEM PROVIDED HISTORY: JESSICA on CKDIII TECHNOLOGIST PROVIDED HISTORY: Reason for exam:->JESSICA on CKDIII FINDINGS: The right kidney measures 9.6 cm in length and the left kidney measures 9.6 cm in length. Kidneys demonstrate normal cortical echogenicity. No hydronephrosis or intrarenal stones. No focal lesions. No hydronephrosis.      Electronically signed by Jose Valenzuela MD on 2/26/2023 at 12:16 PM

## 2023-02-26 NOTE — CONSULTS
PHARMACY ANTICOAGULATION MONITORING SERVICE    Lacinda Cowden is a 80 y.o. male on warfarin therapy for Afib. Pharmacy consulted by Dr. Rivera Adorno for monitoring and adjustment of treatment. Indication for anticoagulation: AFib  INR goal: 2-3  Warfarin dose prior to admission: 5 mg daily    Pertinent Laboratory Values   Recent Labs     02/24/23  1216 02/24/23  1748 02/25/23  0539 02/26/23  0030   INR  --    < > 1.54 1.72   HGB 11.4*  --  10.0* 9.9*   HCT 37.1*  --  33.2* 33.2*   *  --  114* 115*    < > = values in this interval not displayed. Assessment/Plan:  Drug Interactions: Ceftriaxone, prn acetaminophen  INR 1.72  Will continue warfarin 5 mg daily  Pharmacy will continue to monitor and adjust warfarin therapy as indicated    Thank you for the consult.   Kris Mckeon, [facility], [address]  2/26/2023 1:28 PM

## 2023-02-26 NOTE — PROGRESS NOTES
Nephrology Progress Note  2/26/2023 11:17 AM  Subjective: Interval History: Po Barclay is a 80 y.o. male with weakness seen with family in the room legally blind and we discussed the overall plan of care        Data:   Scheduled Meds:   warfarin  5 mg Oral Daily    sodium chloride flush  5-40 mL IntraVENous 2 times per day    cefTRIAXone (ROCEPHIN) IV  1,000 mg IntraVENous Q24H    doxazosin  1 mg Oral Nightly    [Held by provider] sacubitril-valsartan  1 tablet Oral BID    isosorbide dinitrate  30 mg Oral Daily    [Held by provider] furosemide  40 mg Oral Every Other Day    carvedilol  12.5 mg Oral BID WC    warfarin placeholder: dosing by pharmacy   Other RX Placeholder     Continuous Infusions:   sodium chloride 25 mL (02/25/23 0913)         CBC   Recent Labs     02/24/23  1216 02/25/23  0539 02/26/23  0030   WBC 24.3* 21.4* 11.8*   HGB 11.4* 10.0* 9.9*   HCT 37.1* 33.2* 33.2*   * 114* 115*      BMP   Recent Labs     02/24/23  1216 02/25/23  0539 02/26/23  0030    137 139   K 4.7 4.0 4.0   CL 98* 99 100   CO2 29 29 29   BUN 39* 42* 45*   CREATININE 2.5* 2.7* 2.6*     Hepatic:   Recent Labs     02/24/23  1216 02/25/23  0539 02/26/23  0030   AST 18 25 24   ALT 6* 6* 7*   BILITOT 1.3* 0.5 0.4   ALKPHOS 72 66 64     Troponin: No results for input(s): TROPONINI in the last 72 hours. BNP: No results for input(s): BNP in the last 72 hours. Lipids: No results for input(s): CHOL, HDL in the last 72 hours.     Invalid input(s): LDLCALCU  ABGs: No results found for: PHART, PO2ART, RQV2AUF  INR:   Recent Labs     02/24/23  1748 02/25/23  0539 02/26/23  0030   INR 1.59 1.54 1.72     Renal Labs  Albumin:    Lab Results   Component Value Date/Time    LABALBU 3.1 02/26/2023 12:30 AM     Calcium:    Lab Results   Component Value Date/Time    CALCIUM 6.8 02/26/2023 12:30 AM     Phosphorus:    Lab Results   Component Value Date/Time    PHOS 3.0 01/13/2023 02:42 PM     U/A:    Lab Results   Component Value Date/Time    NITRU NEGATIVE 02/24/2023 01:00 PM    COLORU YELLOW 02/24/2023 01:00 PM    WBCUA 270 02/24/2023 01:00 PM    RBCUA 13 02/24/2023 01:00 PM    MUCUS RARE 02/24/2023 01:00 PM    TRICHOMONAS NONE SEEN 02/24/2023 01:00 PM    BACTERIA MODERATE 02/24/2023 01:00 PM    CLARITYU SLIGHTLY CLOUDY 02/24/2023 01:00 PM    Nicholaus Shires <1.005 02/24/2023 01:00 PM    UROBILINOGEN 1.0 02/24/2023 01:00 PM    BILIRUBINUR NEGATIVE 02/24/2023 01:00 PM    BLOODU SMALL NUMBER OR AMOUNT OBSERVED 02/24/2023 01:00 PM    KETUA NEGATIVE 02/24/2023 01:00 PM     ABG:  No results found for: PHART, EUN8JCV, PO2ART, LSO1JWJ, BEART, THGBART, PEU6TKF, E8SZMZOJ  HgBA1c:  No results found for: LABA1C  Microalbumen/Creatinine ratio:  No components found for: RUCREAT  TSH:  No results found for: TSH  IRON:  No results found for: IRON  Iron Saturation:  No components found for: PERCENTFE  TIBC:  No results found for: TIBC  FERRITIN:  No results found for: FERRITIN  RPR:  No results found for: RPR  ADRIAN:  No results found for: ANATITER, ADRIAN  24 Hour Urine for Creatinine Clearance:  No components found for: CREAT4, UHRS10, UTV10      Objective:   I/O: 02/25 0701 - 02/26 0700  In: 720 [P.O.:720]  Out: 700 [Urine:700]  I/O last 3 completed shifts: In: 720 [P.O.:720]  Out: 1100 [Urine:1100]  I/O this shift:  In: 240 [P.O.:240]  Out: -   Vitals: /68   Pulse 90   Temp 98.5 °F (36.9 °C) (Oral)   Resp 19   Ht 6' 4\" (1.93 m)   Wt 214 lb (97.1 kg)   SpO2 99%   BMI 26.05 kg/m²  {  General appearance: awake weak legally blind  HEENT: Head: Normal, normocephalic, atraumatic.   Neck: supple, symmetrical, trachea midline  Lungs: diminished breath sounds bilaterally  Heart: S1, S2 normal  Abdomen: abnormal findings:  soft nt  Extremities: edema trace  Neurologic: Mental status: alertness: alert        Assessment and Plan:      IMP:  1 ckd 4 with arf  2 uti  3 icm ef 20-25%  4 legal blind  5 paf    Plan     #1 creatinine holding 2.6 not worse not uremic  #2 treated for UTI no fever  #3 volume status cardiac status monitor good urine output  #4 aware lately  Family in the room with him we discussed overall plan of care  #5 heart rate stable    At this time the best plan is medical management no acute dialysis needs to be discussed that he is going to rehab or skilled nursing for going home we will see how patient does today and decide tomorrow           Peter Lindsey MD, MD

## 2023-02-26 NOTE — PROGRESS NOTES
Pt's family member to nurses station. Concerned about pt going home and care. They would like to speak with case management. Will passs along to oncoming nurse for family to speak with CM tomorrow regarding discharge planning.

## 2023-02-27 PROBLEM — R41.0 DELIRIUM: Status: ACTIVE | Noted: 2023-02-27

## 2023-02-27 PROBLEM — A49.8 BACTERIAL INFECTION DUE TO MORGANELLA MORGANII: Status: ACTIVE | Noted: 2023-02-27

## 2023-02-27 PROBLEM — N10 ACUTE PYELONEPHRITIS: Status: ACTIVE | Noted: 2023-02-27

## 2023-02-27 LAB
ALBUMIN SERPL-MCNC: 3.2 GM/DL (ref 3.4–5)
ALP BLD-CCNC: 57 IU/L (ref 40–128)
ALT SERPL-CCNC: 8 U/L (ref 10–40)
ANION GAP SERPL CALCULATED.3IONS-SCNC: 11 MMOL/L (ref 4–16)
AST SERPL-CCNC: 19 IU/L (ref 15–37)
BASOPHILS ABSOLUTE: 0 K/CU MM
BASOPHILS RELATIVE PERCENT: 0.4 % (ref 0–1)
BILIRUB SERPL-MCNC: 0.2 MG/DL (ref 0–1)
BUN SERPL-MCNC: 43 MG/DL (ref 6–23)
CALCIUM SERPL-MCNC: 7.4 MG/DL (ref 8.3–10.6)
CHLORIDE BLD-SCNC: 100 MMOL/L (ref 99–110)
CO2: 29 MMOL/L (ref 21–32)
CREAT SERPL-MCNC: 2 MG/DL (ref 0.9–1.3)
DIFFERENTIAL TYPE: ABNORMAL
EOSINOPHILS ABSOLUTE: 0.2 K/CU MM
EOSINOPHILS RELATIVE PERCENT: 2.1 % (ref 0–3)
GFR SERPL CREATININE-BSD FRML MDRD: 32 ML/MIN/1.73M2
GLUCOSE SERPL-MCNC: 119 MG/DL (ref 70–99)
HCT VFR BLD CALC: 34.4 % (ref 42–52)
HEMOGLOBIN: 10.3 GM/DL (ref 13.5–18)
IMMATURE NEUTROPHIL %: 0.2 % (ref 0–0.43)
INR BLD: 1.84 INDEX
LV EF: 20 %
LVEF MODALITY: NORMAL
LYMPHOCYTES ABSOLUTE: 1.9 K/CU MM
LYMPHOCYTES RELATIVE PERCENT: 22.5 % (ref 24–44)
MCH RBC QN AUTO: 28 PG (ref 27–31)
MCHC RBC AUTO-ENTMCNC: 29.9 % (ref 32–36)
MCV RBC AUTO: 93.5 FL (ref 78–100)
MONOCYTES ABSOLUTE: 1.2 K/CU MM
MONOCYTES RELATIVE PERCENT: 15.1 % (ref 0–4)
NUCLEATED RBC %: 0 %
PDW BLD-RTO: 14.3 % (ref 11.7–14.9)
PLATELET # BLD: 141 K/CU MM (ref 140–440)
PMV BLD AUTO: 10.4 FL (ref 7.5–11.1)
POTASSIUM SERPL-SCNC: 4.2 MMOL/L (ref 3.5–5.1)
PROCALCITONIN SERPL-MCNC: 0.45 NG/ML
PROTHROMBIN TIME: 23.9 SECONDS (ref 11.7–14.5)
RBC # BLD: 3.68 M/CU MM (ref 4.6–6.2)
SEGMENTED NEUTROPHILS ABSOLUTE COUNT: 4.9 K/CU MM
SEGMENTED NEUTROPHILS RELATIVE PERCENT: 59.7 % (ref 36–66)
SODIUM BLD-SCNC: 140 MMOL/L (ref 135–145)
TOTAL IMMATURE NEUTOROPHIL: 0.02 K/CU MM
TOTAL NUCLEATED RBC: 0 K/CU MM
TOTAL PROTEIN: 5.8 GM/DL (ref 6.4–8.2)
WBC # BLD: 8.2 K/CU MM (ref 4–10.5)

## 2023-02-27 PROCEDURE — 76937 US GUIDE VASCULAR ACCESS: CPT

## 2023-02-27 PROCEDURE — 2580000003 HC RX 258: Performed by: INTERNAL MEDICINE

## 2023-02-27 PROCEDURE — 80053 COMPREHEN METABOLIC PANEL: CPT

## 2023-02-27 PROCEDURE — 1200000000 HC SEMI PRIVATE

## 2023-02-27 PROCEDURE — 99223 1ST HOSP IP/OBS HIGH 75: CPT | Performed by: INTERNAL MEDICINE

## 2023-02-27 PROCEDURE — 85610 PROTHROMBIN TIME: CPT

## 2023-02-27 PROCEDURE — 6370000000 HC RX 637 (ALT 250 FOR IP): Performed by: FAMILY MEDICINE

## 2023-02-27 PROCEDURE — 97535 SELF CARE MNGMENT TRAINING: CPT

## 2023-02-27 PROCEDURE — 2580000003 HC RX 258

## 2023-02-27 PROCEDURE — 2700000000 HC OXYGEN THERAPY PER DAY

## 2023-02-27 PROCEDURE — 6370000000 HC RX 637 (ALT 250 FOR IP): Performed by: STUDENT IN AN ORGANIZED HEALTH CARE EDUCATION/TRAINING PROGRAM

## 2023-02-27 PROCEDURE — 93306 TTE W/DOPPLER COMPLETE: CPT

## 2023-02-27 PROCEDURE — 97166 OT EVAL MOD COMPLEX 45 MIN: CPT

## 2023-02-27 PROCEDURE — 84145 PROCALCITONIN (PCT): CPT

## 2023-02-27 PROCEDURE — 97162 PT EVAL MOD COMPLEX 30 MIN: CPT

## 2023-02-27 PROCEDURE — 6360000002 HC RX W HCPCS

## 2023-02-27 PROCEDURE — 36415 COLL VENOUS BLD VENIPUNCTURE: CPT

## 2023-02-27 PROCEDURE — APPSS60 APP SPLIT SHARED TIME 46-60 MINUTES: Performed by: NURSE PRACTITIONER

## 2023-02-27 PROCEDURE — 85025 COMPLETE CBC W/AUTO DIFF WBC: CPT

## 2023-02-27 PROCEDURE — 97530 THERAPEUTIC ACTIVITIES: CPT

## 2023-02-27 PROCEDURE — 6360000002 HC RX W HCPCS: Performed by: INTERNAL MEDICINE

## 2023-02-27 PROCEDURE — 2580000003 HC RX 258: Performed by: STUDENT IN AN ORGANIZED HEALTH CARE EDUCATION/TRAINING PROGRAM

## 2023-02-27 PROCEDURE — 99222 1ST HOSP IP/OBS MODERATE 55: CPT | Performed by: INTERNAL MEDICINE

## 2023-02-27 PROCEDURE — 94761 N-INVAS EAR/PLS OXIMETRY MLT: CPT

## 2023-02-27 RX ORDER — PANTOPRAZOLE SODIUM 40 MG/1
40 TABLET, DELAYED RELEASE ORAL
Status: DISCONTINUED | OUTPATIENT
Start: 2023-02-28 | End: 2023-03-01 | Stop reason: HOSPADM

## 2023-02-27 RX ADMIN — PIPERACILLIN AND TAZOBACTAM 2250 MG: 2; .25 INJECTION, POWDER, FOR SOLUTION INTRAVENOUS at 18:16

## 2023-02-27 RX ADMIN — WARFARIN SODIUM 5 MG: 5 TABLET ORAL at 18:18

## 2023-02-27 RX ADMIN — PIPERACILLIN AND TAZOBACTAM 2250 MG: 2; .25 INJECTION, POWDER, FOR SOLUTION INTRAVENOUS at 03:21

## 2023-02-27 RX ADMIN — SODIUM CHLORIDE, PRESERVATIVE FREE 10 ML: 5 INJECTION INTRAVENOUS at 21:34

## 2023-02-27 RX ADMIN — PIPERACILLIN AND TAZOBACTAM 2250 MG: 2; .25 INJECTION, POWDER, FOR SOLUTION INTRAVENOUS at 10:06

## 2023-02-27 RX ADMIN — ISOSORBIDE DINITRATE 30 MG: 20 TABLET ORAL at 10:06

## 2023-02-27 RX ADMIN — CALCIUM GLUCONATE 2000 MG: 98 INJECTION, SOLUTION INTRAVENOUS at 12:06

## 2023-02-27 RX ADMIN — DOXAZOSIN 1 MG: 1 TABLET ORAL at 21:35

## 2023-02-27 RX ADMIN — SODIUM CHLORIDE, PRESERVATIVE FREE 10 ML: 5 INJECTION INTRAVENOUS at 11:25

## 2023-02-27 RX ADMIN — CALCIUM CARBONATE 500 MG: 500 TABLET, CHEWABLE ORAL at 10:17

## 2023-02-27 RX ADMIN — Medication 6 MG: at 21:34

## 2023-02-27 RX ADMIN — PIPERACILLIN AND TAZOBACTAM 2250 MG: 2; .25 INJECTION, POWDER, FOR SOLUTION INTRAVENOUS at 21:39

## 2023-02-27 RX ADMIN — CARVEDILOL 12.5 MG: 6.25 TABLET, FILM COATED ORAL at 10:06

## 2023-02-27 RX ADMIN — CARVEDILOL 12.5 MG: 6.25 TABLET, FILM COATED ORAL at 18:18

## 2023-02-27 RX ADMIN — FAMOTIDINE 20 MG: 20 TABLET ORAL at 21:35

## 2023-02-27 ASSESSMENT — ENCOUNTER SYMPTOMS
BACK PAIN: 1
VOMITING: 0
EYE PAIN: 0
COUGH: 0
DIARRHEA: 0
ABDOMINAL PAIN: 0
BLOOD IN STOOL: 0
NAUSEA: 0
SINUS PRESSURE: 0
SINUS PAIN: 0
EYE DISCHARGE: 0
SHORTNESS OF BREATH: 0
VOICE CHANGE: 0
CHEST TIGHTNESS: 0

## 2023-02-27 NOTE — PROGRESS NOTES
02/27/23 1055   Encounter Summary   Encounter Overview/Reason  Initial Encounter   Service Provided For: Patient and family together   Referral/Consult From: [facility]   Last Encounter  02/27/23  (Had prayer and spiritual conversaton with donavon cheung the room)   Complexity of Encounter Moderate   Begin Time 1033   End Time  1055   Total Time Calculated 22 min   Encounter    Type Initial Screen/Assessment   Spiritual/Emotional needs   Type Spiritual Support   Assessment/Intervention/Outcome   Assessment Hopeful;Peaceful   Intervention Active listening;Discussed belief system/Congregational practices/mohinder;Discussed illness injury and its impact; Discussed meaning/purpose;Discussed relationship with God;Nurtured Hope;Prayer (assurance of)/[place];Sustaining Presence/Ministry of presence   Outcome Encouraged;Engaged in conversation;Expressed feelings of Petty, Peace and/or Love   Plan and Referrals   Plan/Referrals Continue to visit, (comment)

## 2023-02-27 NOTE — PROGRESS NOTES
Physical Therapy  [facility] ACUTE CARE PHYSICAL THERAPY EVALUATION  Glenn Ott, 1935, 2016/2016-A, 2/27/2023    History  [place]:  The primary encounter diagnosis was Delirium. Diagnoses of Acute cystitis with hematuria, Nausea and vomiting, unspecified vomiting type, and Elevated troponin were also pertinent to this visit. Patient  has a past medical history of Chronic kidney disease, Heart abnormality, Hypertension, and UTI (urinary tract infection). Patient  has no past surgical history on file. Subjective:  Patient states: \"You just talk me through it   Pain:  denies   Communication with other providers:  RN, co-eval with Clif BRAND +Student Chelsea BRAND   Restrictions: contact precautions, falls     Home Setup/Prior level of function  Social/Functional History  Lives With: Daughter (and son-in-law)  Type of Home: House  Home Layout: One level  Home Access: Ramped entrance  Bathroom Shower/Tub:  (has been sponge bathing)  Bathroom Toilet: Standard  Bathroom Equipment: Shower chair  Home Equipment: [address], [employer] [address] Help From: Family  ADL Assistance: Needs assistance (has a shower aide that comes 2x per week. Pt can typically manage dressing tasks on his own but family or aide help as needed. Has the most trouble with his socks. Inc time needed)  Toileting: Needs assistance  Homemaking Assistance: Needs assistance  Homemaking Responsibilities: No  Ambulation Assistance: Non-ambulatory (Jaleel with WC short distances in the home)  Transfer Assistance: Needs assistance (Only does pivot transfers. Family helps PRN with transfers. Sleeps in a recliner)  Active : No  Patient's  Info: son-in-law  Mode of Transportation: Car  Occupation: Retired    Examination of body systems (includes body structures/functions, activity/participation limitations):  Observation:  Supine in bed upon arrival. Cooperative with therapy. Daughter and son-in-law visiting.  Pt requires inc time and maximal VC/TCS for sequencing due to significant visual impairments. Vision:  legally blind. Right eye completely blind, left eye can see shadows   Hearing:  [facility]   Cardiopulmonary:  2.5L stable vitals   Orientation: [facility]     Musculoskeletal  ROM R/L:  [facility] BLEs  Strength R/L:  BLES grossly 4/5  Neuro: hx of neuropathy with numbness/tingling from shins to feet     Mobility/treatment:   Rolling L/R:  NT   Supine to sit:  modA for trunk support and constant VCS For sequencing   Transfers:   Sit to stand: initial stand from EOB Gregory x 2 demonstrating a full upright posture. Pt regressed to modA x 1-2 from recliner for ~6 more trials. Progressively weaker with subsequent trials and was unable to come to full upright stand on the last few trials  Pt pulled with single UE support from bed rail transitioning from recliner   Stand to sit: Gregory x 2 for controlling sit to EOB and recliner   Squat pivot: Gregory x 2 for steadying and facilitating turn. Hand over hand guidance for reaching across to arm rest of recliner. Allowed pt to feel the chair next to him prior to transfer due to visual impairments and to inc his overall safety on transferring between surfaces   Sitting balance:  SBA for safety at EOB, static sitting with BUE support progressing to no UE support   Standing balance:  initial stand pt was able to come to full upright stance with Gregory x 2 and slight blocking at knees tolerating ~40 seconds. Pt regressed on the next ~6 trials with only tolerated ~5 to 10 seconds and decreasing total knee extension keeping flexed knees and posture. Blocking to knees to provide inc support and prevent quick buckling. Total assist for deepika care management due to pt being incontinent of bowel and urine. Gait: NT, does not ambulate at baseline   Educated pt on POC, role of PT, DME use, discharge recommendations.  Cues provided for sequencing to inc safety and indep with mobility    [facility] 6 Clicks Inpatient Mobility:  AM-PAC Inpatient Mobility Raw Score : 8    Safety: patient left in chair with alarm, call light within reach,  gait belt used. Assessment:  Pt is an 80year old male admitted with nausea, vomiting, and weakness. Diagnosed with severe sepsis. Recommend subacute rehab once medically. At baseline he is able to manage some of his transfers and ADLS on his own and has family/aide to assist when needed. He performed below his baseline today with dec strength, balance, and activity tolerance. He would benefit from continue therapy to address his current deficits, dec potential fall risk, dec burden of care ,and restore PLOF. Complexity: Moderate  Prognosis: Good, no significant barriers to participation at this time. Plan:  3+x/week  Discharge Recommendations: Subacute/Skilled Nursing Facility  Equipment: continue to assess at next level of care     Goals:  Short Term Goals  Time Frame for Short Term Goals: 2 weeks  Short Term Goal 1: Pt will perform sit><supine SBA  Short Term Goal 2: Pt will transition sit><stand Gregory  Short Term Goal 3: Pt will tolerate 1 minute static stance CGA with BUE support  Short Term Goal 4: Pt will transfer between surfaces Gregory  Short Term Goal 5: Pt will propel WC 50ft SBA       Treatment plan:  Bed mobility, transfers, balance, gait, TA, TX, WC     Recommendations for NURSING mobility: squat pivot, 2 person recommended for safety due to visual impairments and low recliner.      Time:   Time in: 1357  Time out: 1441  Timed treatment minutes: 29  Total time: 44 minutes     Electronically signed by:    Adarsh Baltazar [id number]  2/27/2023, 3:42 PM

## 2023-02-27 NOTE — PROGRESS NOTES
V2.0  [facility] Hospitalist Progress Note      Name:  Mary Joy /Age/Sex: 1935  (80 y.o. male)   MRN & CSN:  [id number] & [id number] Encounter Date/Time: 2023 12:16 PM EST    Location:  -A PCP: Esteban Anthony MD       Hospital Day: 4    Assessment and Plan:   Mary Joy is a 80 y.o. male with pmh of hypertension, paroxysmal atrial fibrillation, chronic HFrEF, chronic respiratory failure on 2-3 L of O2 on and off, CKD stage III a and legally blind who presents with Severe sepsis ([facility] [address]    Plan:  Severe sepsis with acute encephalopathy in the setting of suspected UTI with intractable nausea and vomiting: UA positive for leukocyte esterase hematuria pyuria and bacteriuria. Blood cultures were drawn in which 1 out of 4 bottles are showing Enterobacter species. Urine culture shows Morganella. Await sensitivities and continue antibiotics. Change antibiotic based on sensitivity results. Symptoms are improving significantly. Was on IV antibiotics Rocephin, changed to zosyn IV. Delirium precautions. De-escalate antibiotics based on the results of the cultures and sensitivities. Continue to monitor. Infectious disease has been consulted case was discussed with Dr. Shivani Munoz  recommendations  COPD with suspected 2 to 3 L oxygen at home? Patient states patient uses oxygen at home? Documentations from the charts showed that the patient did not qualify in the beginning? Unsure regarding that. Will confirm with   JESSICA on CKD stage III AA: Baseline creatinine of around 2.1. Most recent creatinine is 2.6. Avoid nephrotoxic agents continue to monitor. Nephrology on board  Elevated troponin likely demand ischemia in the setting of underlying CKD stage IIIa  Benign essential hypertension, home medication included Coreg 12.5 mg twice daily, Entresto Isordil 30 mg daily Cardura 1 mg at night and Lasix 40 mg every other day. Currently holding Entresto and Lasix due to JESSICA.   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 Most recent stress test showed a large anterior septal defect was on Entresto Coreg Imdur and Lasix. Continue to monitor. Paroxysmal atrial fibrillation on Coumadin  Legally blind continue fall precautions. Diet ADULT DIET; Regular; Low Fat/Low Chol/High Fiber/PRISCILLA   DVT Prophylaxis [] Lovenox, []  Heparin, [] SCDs, [] Ambulation,  [] Eliquis, [] Xarelto  [] Coumadin   Code Status Full Code   Disposition From: Home  Expected Disposition: home  Estimated Date of Discharge: 2 to 3 days  Patient requires continued admission due to Enterobacter in the blood, needs IV antibiotics and ID recommendations for duration of antibiotics for that. Surrogate Decision Maker/ POA      Subjective:     Chief Complaint: Emesis and Fatigue     Patient was seen at the bedside patient is legally blind. All questions were answered at bedside. Patient is tolerating diet no acute events otherwise. Family was not there today. Patient was asking about blood cultures explained in detail why patient needs to stay in the hospital for IV antibiotics and why antibiotics were changed to Zosyn for now. He was also explained that given Zosyn will be changed once the sensitivities are back for the Enterobacter in blood  Review of Systems:    Review of Systems   Constitutional:  Positive for activity change and appetite change. Negative for chills, fever and unexpected weight change. HENT:  Negative for ear pain, hearing loss, sinus pressure, sinus pain and voice change. Eyes:  Positive for visual disturbance. Negative for pain and discharge. Respiratory:  Negative for cough, chest tightness and shortness of breath. Cardiovascular:  Negative for chest pain, palpitations and leg swelling.    Gastrointestinal: Negative for abdominal pain, blood in stool, diarrhea, nausea and vomiting. Genitourinary:  Negative for dysuria and frequency. Musculoskeletal:  Positive for arthralgias and back pain. Neurological:  Positive for weakness. Negative for dizziness, light-headedness and headaches. Psychiatric/Behavioral:  Positive for decreased concentration and sleep disturbance. Objective: Intake/Output Summary (Last 24 hours) at 2/27/2023 1124  Last data filed at 2/27/2023 0303  Gross per 24 hour   Intake 360 ml   Output 575 ml   Net -215 ml          Vitals:   Vitals:    02/27/23 1003   BP: 129/71   Pulse: 81   Resp: 22   Temp: 97.8 °F (36.6 °C)   SpO2: 99%       Physical Exam:   Physical Exam  Vitals reviewed. Constitutional:       Appearance: Normal appearance. He is normal weight. He is ill-appearing. HENT:      Head: Normocephalic. Right Ear: Tympanic membrane normal.      Left Ear: Tympanic membrane normal.      Nose: Nose normal.      Mouth/Throat:      Mouth: Mucous membranes are moist.   Eyes:      Conjunctiva/sclera: Conjunctivae normal.      Pupils: Pupils are equal, round, and reactive to light. Cardiovascular:      Rate and Rhythm: Regular rhythm. Tachycardia present. Pulses: Normal pulses. Heart sounds: Normal heart sounds. No murmur heard. Pulmonary:      Effort: Pulmonary effort is normal.      Breath sounds: Normal breath sounds. No wheezing, rhonchi or rales. Abdominal:      General: Abdomen is flat. Bowel sounds are normal. There is no distension. Palpations: Abdomen is soft. Tenderness: There is no abdominal tenderness. Musculoskeletal:         General: No deformity. Normal range of motion. Cervical back: Normal range of motion and neck supple. Right lower leg: Edema present. Left lower leg: Edema present. Skin:     General: Skin is dry. Coloration: Skin is not jaundiced or pale. Neurological:      General: No focal deficit present. Mental Status: He is alert and oriented to person, place, and time. Mental status is at baseline. Motor: Weakness present.    Psychiatric:         Mood and Affect: Mood normal.         Behavior: Behavior normal.          Medications:   Medications:    calcium gluconate IVPB  2,000 mg IntraVENous Once    piperacillin-tazobactam  2,250 mg IntraVENous Q6H    famotidine  20 mg Oral Nightly    warfarin  5 mg Oral Daily    sodium chloride flush  5-40 mL IntraVENous 2 times per day    doxazosin  1 mg Oral Nightly    [Held by provider] sacubitril-valsartan  1 tablet Oral BID    isosorbide dinitrate  30 mg Oral Daily    [Held by provider] furosemide  40 mg Oral Every Other Day    carvedilol  12.5 mg Oral BID WC    warfarin placeholder: dosing by pharmacy   Other RX Placeholder      Infusions:    sodium chloride 25 mL (02/25/23 0913)     PRN Meds: calcium carbonate, 500 mg, TID PRN  melatonin, 6 mg, Nightly PRN  guaiFENesin, 200 mg, Q4H PRN  sodium chloride, , PRN  acetaminophen, 650 mg, Q6H PRN   Or  acetaminophen, 650 mg, Q6H PRN      Labs      Recent Results (from the past 24 hour(s))   Comprehensive Metabolic Panel w/ Reflex to MG    Collection Time: 02/27/23  7:56 AM   Result Value Ref Range    Sodium 140 135 - 145 MMOL/L    Potassium 4.2 3.5 - 5.1 MMOL/L    Chloride 100 99 - 110 mMol/L    CO2 29 21 - 32 MMOL/L    BUN 43 (H) 6 - 23 MG/DL    Creatinine 2.0 (H) 0.9 - 1.3 MG/DL    Est, Glom Filt Rate 32 (L) >60 mL/min/1.73m2    Glucose 119 (H) 70 - 99 MG/DL    Calcium 7.4 (L) 8.3 - 10.6 MG/DL    Albumin 3.2 (L) 3.4 - 5.0 GM/DL    Total Protein 5.8 (L) 6.4 - 8.2 GM/DL    Total Bilirubin 0.2 0.0 - 1.0 MG/DL    ALT 8 (L) 10 - 40 U/L    AST 19 15 - 37 IU/L    Alkaline Phosphatase 57 40 - 128 IU/L    Anion Gap 11 4 - 16   Procalcitonin    Collection Time: 02/27/23  7:56 AM   Result Value Ref Range    Procalcitonin 0.452    CBC with Auto Differential    Collection Time: 02/27/23  7:56 AM   Result Value Ref Range    WBC 8.2 4.0 - 10.5 K/CU MM    RBC 3.68 (L) 4.6 - 6.2 M/CU MM    Hemoglobin 10.3 (L) 13.5 - 18.0 GM/DL    Hematocrit 34.4 (L) 42 - 52 %    MCV 93.5 78 - 100 FL    MCH 28.0 27 - 31 PG    MCHC 29.9 (L) 32.0 - 36.0 %    RDW 14.3 11.7 - 14.9 %    Platelets 445 751 - 150 K/CU MM    MPV 10.4 7.5 - 11.1 FL    Differential Type AUTOMATED DIFFERENTIAL     Segs Relative 59.7 36 - 66 %    Lymphocytes % 22.5 (L) 24 - 44 %    Monocytes % 15.1 (H) 0 - 4 %    Eosinophils % 2.1 0 - 3 %    Basophils % 0.4 0 - 1 %    Segs Absolute 4.9 K/CU MM    Lymphocytes Absolute 1.9 K/CU MM    Monocytes Absolute 1.2 K/CU MM    Eosinophils Absolute 0.2 K/CU MM    Basophils Absolute 0.0 K/CU MM    Nucleated RBC % 0.0 %    Total Nucleated RBC 0.0 K/CU MM    Total Immature Neutrophil 0.02 K/CU MM    Immature Neutrophil % 0.2 0 - 0.43 %   Protime-INR    Collection Time: 02/27/23  7:56 AM   Result Value Ref Range    Protime 23.9 (H) 11.7 - 14.5 SECONDS    INR 1.84 INDEX        Imaging/Diagnostics Last 24 Hours   CT HEAD WO CONTRAST    Result Date: 2/24/2023  EXAMINATION: CT OF THE HEAD WITHOUT CONTRAST  2/24/2023 12:22 pm TECHNIQUE: CT of the head was performed without the administration of intravenous contrast. Automated exposure control, iterative reconstruction, and/or weight based adjustment of the mA/kV was utilized to reduce the radiation dose to as low as reasonably achievable. COMPARISON: None. HISTORY: ORDERING SYSTEM PROVIDED HISTORY: Confusion FINDINGS: BRAIN/VENTRICLES:  No evidence of an acute infarct or other acute parenchymal process. No evidence of acute intracranial hemorrhage. There is no evidence of an intracranial mass or extraaxial fluid collection. No significant mass effect or midline shift. There is mild generalized volume loss. Ventricular enlargement concordant with the degree of parenchymal volume loss.  Scattered patchy foci of low attenuation are present within supratentorial white matter which is a nonspecific finding but likely represents mild chronic microvascular ischemia. Remote lacunar infarcts throughout the bilateral basal ganglia. ORBITS: The visualized portion of the orbits demonstrate no acute abnormality. Right scleral banding. Left lens extraction. SINUSES:  The visualized paranasal sinuses and mastoid air cells demonstrate no acute abnormality. SOFT TISSUES/SKULL: No acute abnormality of the visualized skull or soft tissues. No acute intracranial abnormality. XR CHEST PORTABLE    Result Date: 2/24/2023  EXAMINATION: ONE XRAY VIEW OF THE CHEST 2/24/2023 12:08 pm COMPARISON: 1/31/2023, 9/7/2022 HISTORY: ORDERING SYSTEM PROVIDED HISTORY: Confusion TECHNOLOGIST PROVIDED HISTORY: Reason for exam:->Confusion Reason for Exam: Confusion FINDINGS: The cardiomediastinal silhouette is unchanged. Bibasilar streaky opacities persist, favoring scarring or atelectasis. No new focal consolidation, pneumothorax, or pleural effusion. Osseous structures are diffusely demineralized and grossly unchanged. No acute process. US RETROPERITONEAL LIMITED    Result Date: 2/25/2023  EXAMINATION: ULTRASOUND OF THE KIDNEYS 2/25/2023 11:20 am COMPARISON: None. HISTORY: ORDERING SYSTEM PROVIDED HISTORY: JESSICA on CKDIII TECHNOLOGIST PROVIDED HISTORY: Reason for exam:->JESSICA on CKDIII FINDINGS: The right kidney measures 9.6 cm in length and the left kidney measures 9.6 cm in length. Kidneys demonstrate normal cortical echogenicity. No hydronephrosis or intrarenal stones. No focal lesions. No hydronephrosis.      Electronically signed by Chantelle Riddle MD on 2/27/2023 at 11:24 AM

## 2023-02-27 NOTE — PROGRESS NOTES
Occupational Therapy  [facility] ACUTE CARE OCCUPATIONAL THERAPY EVALUATION    Gifty Olivo, 1935, 2016/2016-A, 2/27/2023    Discharge Recommendation: Albert Li      History:  [place]:  The primary encounter diagnosis was Delirium. Diagnoses of Acute cystitis with hematuria, Nausea and vomiting, unspecified vomiting type, and Elevated troponin were also pertinent to this visit. Subjective:  Patient states: \"Tell me what you want me to do first and then I'll try my best to do as much as I can by myself! \"  Pain: Pt denied pain this date  Communication with other providers: co-dev w/ PT Katerina   Restrictions: General Precautions, Fall Risk, Contact Precautions, 2.5 L of O2, Telemetry, Pulse Ox    Home Setup/Prior level of function:  Social/Functional History  Lives With: Daughter (and son-in-law)  Type of Home: House  Home Layout: One level  Home Access: Ramped entrance  Bathroom Shower/Tub:  (has been sponge bathing)  Bathroom Toilet: Standard  Bathroom Equipment: Shower chair  Home Equipment: PetNewzulu , Nyxoah ByveJobaline 50 Help From: Family  ADL Assistance: Needs assistance (has a shower aide that comes 2x per week. Pt can typically manage dressing tasks on his own but family or aide help as needed. Has the most trouble with his socks. Inc time needed)  Toileting: Needs assistance  Homemaking Assistance: Needs assistance  Homemaking Responsibilities: No  Ambulation Assistance: Non-ambulatory (Jaleel with WC short distances in the home)  Transfer Assistance: Needs assistance (Only does pivot transfers. Family helps PRN with transfers. Sleeps in a recliner)  Active : No  Patient's  Info: son-in-law  Mode of Transportation: Car  Occupation: Retired    Examination:  Observation: Supine in bed upon arrival w/ daughter and son-in-law present. Pt agreeable to evaluation. Pt legally blind and requires significant verbal and tactile cues for sequencing and inc time. 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, can see shadows in L eye   Hearing: WFL  Vitals: Stable vitals throughout session on 2.5L O2    Body Systems and functions:  ROM: WFL in BL UEs   Strength: 4/5 MMT in BL UEs    Sensation: WFL (denies numbness/tingling in UEs; see PT eval for LEs)  Tone: Normal  Coordination: WFL  Perception: WNL    Activities of Daily Living (ADLs):  Feeding: Set-up/Supervision   Grooming: SBA (while seated)  UB bathing: SBA (w/ initial set up)   LB bathing: Max A (assist to reach distal BL LEs, buttocks, and posterior thighs)  UB dressing: SBA (w/ initial set up)  LB dressing: Dependent (pt dep for socks this date; dep to don depends)   Toileting: Dependent (pt attempted to have a BM while seated in chair w/ bedpan placed underneath bottom after being incontinent for stool and urine; pt dep for deepika hygiene; assist to manage clothing both directions)    Cognitive and Psychosocial Functioning:  Overall cognitive status: WFL- max cues (verbal and tactile) for sequencing and safety d/t visual impairment   Affect: Normal     Balance:   Sitting: SBA sitting unsupported EOB   Standing: Min A x2 (10-40 seconds)    Functional Mobility:  Bed Mobility: Mod A for uprighting trunk supine to sitting EOB w/ HOB elevated w/ max vcs for sequencing   Transfers: Min A x2 sit to stand from EOB; Pt regressed to Mod x1-2 for remaining ~5-6 trials and unable to reach a full upright stand on last few trials; Min A x2 stand to sit ~6-7 trials (for controlled descent); Min A x2 SPT from EOB to recliner (tactile and verbal cues for sequencing for all transfers d/t visual impairments)    Ambulation: NT- pt does not ambulate at baseline       AM-PAC 6 click short form for inpatient daily activity:   How much help from another person does the patient currently need. .. Unable  Dep A Lot  Max A A Lot   Mod A A Little  Min A A Little   CGA  SBA None   Mod I  Indep  Sup   1.   Putting on and taking off regular lower body clothing? [x] 1    [] 2   [] 2   [] 3   [] 3   [] 4      2. Bathing (including washing, rinsing, drying)? [] 1   [] 2   [x] 2 [] 3 [] 3 [] 4   3. Toileting, which includes using toilet, bedpan, or urinal? [x] 1    [] 2   [] 2   [] 3   [] 3   [] 4     4. Putting on and taking off regular upper body clothing? [] 1   [] 2   [] 2   [] 3   [x] 3    [] 4      5. Taking care of personal grooming such as brushing teeth? [] 1   [] 2    [] 2 [] 3    [x] 3   [] 4      6. Eating meals? [] 1   [] 2   [] 2   [] 3   [x] 3   [] 4      Raw Score:  13     [24=0% impaired(CH), 23=1-19%(CI), 20-22=20-39%(CJ), 15-19=40-59%(CK), 10-14=60-79%(CL), 7-9=80-99%(CM), 6=100%(CN)]     Treatment:  Self Care Training:   Cues were given for safety, sequence, UE/LE placement, visual cues, and balance. Activities performed today included LB dressing tasks, toileting   Therapeutic Activity Training:   Therapeutic activity training was instructed today. Cues were given for safety, sequence, UE/LE placement, awareness, and balance. Activities performed today included bed mobility training, transfer training, standing balance/tolerance, edu on role of OT, importance for OOB activity, POC, and d/c recs      Safety Measures: Gait belt used, Left in Chair, Alarm in place    Assessment:  Pt is a 79 yo M w/ a past medical history of Chronic kidney disease, Heart abnormality, Hypertension, and UTI (urinary tract infection). Pt admitted w/ nausea, vomiting, and weakness and dx w/ severe sepsis. Pt lives w/ daughter and son-in-law and at baseline needs some assist w/ ADLs (has a shower aide; can typically manage dressing tasks on his own but family or aide help PRN; inc time needed), needs assist for IADLs, and does not ambulate (is Jaleel with WC short distances in the home). Pt presents with the above impairments and would benefit from continued acute care OT services. Recommend SNF at discharge.        Complexity: Moderate  Prognosis: Good  Plan: 3x/week      Goals:  1. Pt will complete all aspects of bed mobility for EOB/OOB ADLs SBA   2. Pt will complete UB/LB bathing Min A w/ long handled sponge PRN   3. Pt will complete all aspects of LB dressing Mod A w/ AE/techniques PRN   4. Pt will complete all functional transfers to and from bed, chair, BSC Min A  5. Pt will complete all aspects of toileting task on [facility] Max A   6. Pt will complete oral hygiene/grooming routine in sitting unsupported EOB w/ supervision and initial set-up assistance  7.  Pt will complete ther ex/ther act with focus on UE strengthening and standing balance/tolerance >2 min             Time:   Time in: 1357  Time out: 1441  Timed treatment minutes: 29  Total time: 44      Electronically signed by:    Yamile Grubbs S/OT     Aida Kussmaul, OTR/L, [address], [place].[id number]

## 2023-02-27 NOTE — PROGRESS NOTES
Physician Progress Note      Amrita Blake  CSN #:                  [id number]  :                       1935  ADMIT DATE:       2023 10:56 AM  DISCH DATE:  RESPONDING  PROVIDER #:        Reddy Mathias MD          QUERY TEXT:    Pt admitted with sepsis and has encephalopathy documented. If possible, please   document in progress notes and discharge summary further specificity   regarding the type of encephalopathy:    The medical record reflects the following:  Risk Factors: age, sepsis, UTI  Clinical Indicators: JESSICA. Attending progress note on : Acute   encephalopathy suspected secondary to UTI. Head CT on : No acute   intracranial abnormality. Treatment: fall precautions, labs, imaging    LAZARO Prieto, RN, [facility]  Clinical   [phone]  Options provided:  -- Metabolic encephalopathy  -- Other - I will add my own diagnosis  -- Disagree - Not applicable / Not valid  -- Disagree - Clinically unable to determine / Unknown  -- Refer to Clinical Documentation Reviewer    PROVIDER RESPONSE TEXT:    This patient has metabolic encephalopathy.     Query created by: Angela Avelar on 2023 7:30 AM      Electronically signed by:  Reddy Mathias MD 2023 7:34 AM

## 2023-02-27 NOTE — CONSULTS
Consult completed. Nexiva 20g 1.75 inch peripheral IV initiated into right anterior lower forearm x 1 attempt with ultrasound guidance. Brisk blood return, flushes without resistance. Patient tolerated well. Primary nurse Amy Mckenzie notified. Please consult IV/PICC team if patient's needs change, questions, or concerns.

## 2023-02-27 NOTE — CARE COORDINATION
CM left a voicemail for [employer] at [facility] regarding possible referral for skilled therapy. CM will follow. 1:33 PM  CM received a return call from [employer] at [facility] stating that she will review the patient's chart and watch for the PT/OT evaluations which are pending at this time. CM will follow.

## 2023-02-27 NOTE — CONSULTS
Department of Internal Medicine  Gastroenterology Consult Note  Anat Vuong MD      Reason for Consult:  Vomiting    Primary Care Physician:  Wood Jackson MD    History Obtained From:  patient    HISTORY OF PRESENT ILLNESS:              The patient is a 80 y.o.  male who was admitted with urosepsis who has had some intermittent vomiting. He tells me that he suffers from reflux disease and had noted some intermittent vomiting as an outpatient. His daughter had been giving him some zofran as needed. He also had been on otc prilosec, he is legally blind and he is not sure if they are still giving him this. The chart lists that he was on pepcid. He had two episodes of vomiting undigested food in the last 48 hours. He denies any abdominal pain. Non-diabetic, denies early satiety or bloating. Past Medical History:        Diagnosis Date    Chronic kidney disease     Heart abnormality     Hypertension     UTI (urinary tract infection)        Past Surgical History:    History reviewed. No pertinent surgical history. Medications Prior to Admission:    Prior to Admission medications    Medication Sig Start Date End Date Taking?  Authorizing Provider   amLODIPine (NORVASC) 5 MG tablet Take 5 mg by mouth daily   Yes Historical Provider, MD   famotidine (PEPCID) 40 MG tablet Take 40 mg by mouth daily 1/31/23   Historical Provider, MD   finasteride (PROSCAR) 5 MG tablet Take 5 mg by mouth daily 2/22/23   Historical Provider, MD   sacubitril-valsartan (ENTRESTO) 49-51 MG per tablet Take 1 tablet by mouth 2 times daily  Patient not taking: Reported on 2/25/2023 1/16/23   John Gregg MD   carvedilol (COREG) 12.5 MG tablet Take 1 tablet by mouth 2 times daily (with meals) 1/16/23   John Gregg MD   Multiple Vitamins-Minerals (PRESERVISION AREDS 2 PO) Take 1 tablet by mouth daily    Historical Provider, MD   docusate sodium (COLACE) 100 MG capsule Take 100 mg by mouth daily 10/1/22   Historical Provider, MD latanoprost (XALATAN) 0.005 % ophthalmic solution INSTILL 1 DROP IN RIGHT EYE AT BEDTIME 9/14/22   Historical Provider, MD   isosorbide dinitrate (ISORDIL) 30 MG tablet Take 30 mg by mouth daily    Historical Provider, MD   ondansetron (ZOFRAN) 4 MG tablet Take 4 mg by mouth as needed for Nausea or Vomiting    Historical Provider, MD   Oxygen Concentrator     Historical Provider, MD   Omega-3 Fatty Acids (OMEGA-3 FISH OIL) 1000 MG CAPS Take by mouth  Patient not taking: Reported on 2/25/2023    Historical Provider, MD   furosemide (LASIX) 40 MG tablet Take 1 tablet by mouth every other day  Patient taking differently: Take 40 mg by mouth See Admin Instructions 40mg tab daily, then an additional dose of 40mg every other day for total of 80mg every other 8/22/22   Wesly Su MD   hydrALAZINE (APRESOLINE) 25 MG tablet Take 1 tablet by mouth 3 times daily 7/5/22 1/18/23  Anne Marie Shi MD   terazosin (HYTRIN) 5 MG capsule Take 1 capsule by mouth nightly 6/21/22   Anne Marie Shi MD   warfarin (COUMADIN) 5 MG tablet Take 1 tablet by mouth daily 6/21/22   Anne Marie Shi MD   potassium chloride (KLOR-CON M) 20 MEQ extended release tablet Take 1 tablet by mouth daily  Patient not taking: Reported on 2/25/2023 2/16/22   AnneM arie Shi MD       Allergies:  No Known Allergies. Social History:    TOBACCO:  No  ETOH:  No    Family History:   Family History   Problem Relation Age of Onset    Kidney Disease Mother     Hypertension Mother     Diabetes Mother     Diabetes Granddaughter     Hypertension Daughter     Cancer Daughter        REVIEW OF SYSTEMS: (POSITIVES WILL BE UNDERLINED)  CONSTITUTIONAL:    Weight change,fatigue, fever, chills  EYES:  Diplopia, change in vision  EARS:  hearing loss, tinnitus, vertigo  NOSE:   epistaxis  MOUTH/THROAT:     hoarseness, sore throat.   RESPIRATORY:  SOB,  cough, sputum, hemoptysis  CARDIOVASCULAR : chest pain,palpitations, dyspnea exertion, orthopnea, paroxysmal nocturnal dyspnea, pedal edema. GASTROINTESTINAL:  See HPI  GENITOURINARY:   dysuria, hematuria, . HEMATOLOGIC/LYMPHATIC:   Anemia, bleeding tendencies. MUSCULOSKELETAL:    myalgias,  joint pain  NEUROLOGICAL:   Loss of Consciousness, paresthesias, anesthesias, focal weakness  SKIN :   History of dermatitis, rashes  PSYCHIATRIC:  depression, , anxiety past psychosis  ENDOCRINE:  History of diabetes, thyroid disease  ALL/IMM : allergies, rashes    PHYSICAL EXAM:    Vitals:  BP (!) 148/100   Pulse 96   Temp 97.6 °F (36.4 °C) (Oral)   Resp 23   Ht 6' 4\" (1.93 m)   Wt 214 lb (97.1 kg)   SpO2 100%   BMI 26.05 kg/m²   CONSTITUTIONAL: alert, cooperative, no apparent distress,   EYES:  pupils equal, round and reactive to light and sclera clear  ENT:  normocepalic, without obvious abnormality  NECK:  supple, symmetrical, trachea midline  HEMATOLOGIC/LYMPHATICS:  no cervical lymphadenopathy and no supraclavicular lymphadenopathy  LUNGS:  clear to auscultation  CARDIOVASCULAR:  regular rate and rhythm and no murmur noted  ABDOMEN:  Soft, non-tender with normal bowel sounds. No organomegaly or masses  NEUROLOGIC: no focal deficit detected  SKIN:  no lesions  EXTREMITIES: no clubbing, cyanosis, or edema    IMPRESSION:  1) Vomiting likely from reflux. RECOMMENDATIONS:  1) Trial of ppi, if his symptoms continue egd as outpatient.            Electronically signed by Eleni Ware MD on 2/27/2023 at 8:18 AM

## 2023-02-27 NOTE — PROGRESS NOTES
Nephrology Progress Note  2/27/2023 9:18 AM  Subjective: Interval History: Alyssa Jernigan is a 80 y.o. male appears to be doing better is more awake today and seen by GI as well      Data:   Scheduled Meds:   piperacillin-tazobactam  2,250 mg IntraVENous Q6H    famotidine  20 mg Oral Nightly    warfarin  5 mg Oral Daily    sodium chloride flush  5-40 mL IntraVENous 2 times per day    doxazosin  1 mg Oral Nightly    [Held by provider] sacubitril-valsartan  1 tablet Oral BID    isosorbide dinitrate  30 mg Oral Daily    [Held by provider] furosemide  40 mg Oral Every Other Day    carvedilol  12.5 mg Oral BID WC    warfarin placeholder: dosing by pharmacy   Other RX Placeholder     Continuous Infusions:   sodium chloride 25 mL (02/25/23 0913)         CBC   Recent Labs     02/25/23  0539 02/26/23  0030 02/27/23  0756   WBC 21.4* 11.8* 8.2   HGB 10.0* 9.9* 10.3*   HCT 33.2* 33.2* 34.4*   * 115* 141      BMP   Recent Labs     02/24/23  1216 02/25/23  0539 02/26/23  0030    137 139   K 4.7 4.0 4.0   CL 98* 99 100   CO2 29 29 29   BUN 39* 42* 45*   CREATININE 2.5* 2.7* 2.6*     Hepatic:   Recent Labs     02/24/23  1216 02/25/23  0539 02/26/23  0030   AST 18 25 24   ALT 6* 6* 7*   BILITOT 1.3* 0.5 0.4   ALKPHOS 72 66 64     Troponin: No results for input(s): TROPONINI in the last 72 hours. BNP: No results for input(s): BNP in the last 72 hours. Lipids: No results for input(s): CHOL, HDL in the last 72 hours.     Invalid input(s): LDLCALCU  ABGs: No results found for: PHART, PO2ART, JSQ8SYI  INR:   Recent Labs     02/25/23  0539 02/26/23  0030 02/27/23  0756   INR 1.54 1.72 1.84     Renal Labs  Albumin:    Lab Results   Component Value Date/Time    LABALBU 3.1 02/26/2023 12:30 AM     Calcium:    Lab Results   Component Value Date/Time    CALCIUM 6.8 02/26/2023 12:30 AM     Phosphorus:    Lab Results   Component Value Date/Time    PHOS 3.0 01/13/2023 02:42 PM     U/A:    Lab Results   Component Value Date/Time    NITRU NEGATIVE 02/24/2023 01:00 PM    COLORU YELLOW 02/24/2023 01:00 PM    WBCUA 270 02/24/2023 01:00 PM    RBCUA 13 02/24/2023 01:00 PM    MUCUS RARE 02/24/2023 01:00 PM    TRICHOMONAS NONE SEEN 02/24/2023 01:00 PM    BACTERIA MODERATE 02/24/2023 01:00 PM    CLARITYU SLIGHTLY CLOUDY 02/24/2023 01:00 PM    [place] <1.005 02/24/2023 01:00 PM    UROBILINOGEN 1.0 02/24/2023 01:00 PM    BILIRUBINUR NEGATIVE 02/24/2023 01:00 PM    BLOODU SMALL NUMBER OR AMOUNT OBSERVED 02/24/2023 01:00 PM    KETUA NEGATIVE 02/24/2023 01:00 PM     ABG:  No results found for: PHART, KSR2PDK, PO2ART, YQV9GNN, BEART, THGBART, HQH4KVS, U0AOHROD  HgBA1c:  No results found for: LABA1C  Microalbumen/Creatinine ratio:  No components found for: RUCREAT  TSH:  No results found for: TSH  IRON:  No results found for: IRON  Iron Saturation:  No components found for: PERCENTFE  TIBC:  No results found for: TIBC  FERRITIN:  No results found for: FERRITIN  RPR:  No results found for: RPR  ADRIAN:  No results found for: ANATITER, ADRIAN  24 Hour Urine for Creatinine Clearance:  No components found for: CREAT4, UHRS10, UTV10      Objective:   I/O: 02/26 0701 - 02/27 0700  In: 600 [P.O.:600]  Out: 575 [Urine:575]  I/O last 3 completed shifts: In: 600 [P.O.:600]  Out: 950 [Urine:950]  No intake/output data recorded. Vitals: BP (!) 148/100   Pulse 96   Temp 97.6 °F (36.4 °C) (Oral)   Resp 23   Ht 6' 4\" (1.93 m)   Wt 214 lb (97.1 kg)   SpO2 100%   BMI 26.05 kg/m²  {  General appearance: awake weak legally blind  HEENT: Head: Normal, normocephalic, atraumatic.   Neck: supple, symmetrical, trachea midline  Lungs: diminished breath sounds bilaterally  Heart: S1, S2 normal  Abdomen: abnormal findings:  soft nt  Extremities: edema trace  Neurologic: Mental status: alertness: alert        Assessment and Plan:      IMP:  1 ckd 4 with arf  2 uti  3 icm ef 20-25%  4 legal blind  5 paf    Plan     #1 creatinine holding 2.6 slightly improved will monitor  #2 no fever treated for UTI  #3 cardiac status stable volume stable good urine output  #4 aware he is legally blind and discussed in detail with him about his care  #5 heart rate variable but stable for now  Follow-up GI recommendations in the above setting with reflux as well and replete calcium and possible EGD as outpatient if not improved           Karley Donaldson MD, MD

## 2023-02-27 NOTE — CARE COORDINATION
CM attempted to call the patient's daughter Janett Castillo ([phone]) to initiate discharge planning but there was no answer, voicemail was left requesting a return call. CM will follow.

## 2023-02-27 NOTE — CONSULTS
PHARMACY ANTICOAGULATION MONITORING SERVICE    Audra Lee is a 80 y.o. male on warfarin therapy for Afib. Pharmacy consulted by Dr. Archuleta Members for monitoring and adjustment of treatment. Indication for anticoagulation: AFib  INR goal: 2-3  Warfarin dose prior to admission: 5 mg daily    Pertinent Laboratory Values   Recent Labs     02/25/23  0539 02/26/23  0030 02/27/23  0756   INR 1.54 1.72 1.84   HGB 10.0* 9.9* 10.3*   HCT 33.2* 33.2* 34.4*   * 115* 141         Assessment/Plan:  Drug Interactions: Zosyn, prn acetaminophen  INR continues to trend up  Will continue warfarin 5 mg daily  Pharmacy will continue to monitor and adjust warfarin therapy as indicated    Thank you for the consult.   [id number] [facility] [address], [address] [facility]  2/27/2023 4:27 PM

## 2023-02-27 NOTE — CARE COORDINATION
Case Management Assessment  Initial Evaluation    Date/Time of Evaluation: 2/27/2023 12:22 PM  Assessment Completed by: Silvia Maldonado RN    If patient is discharged prior to next notation, then this note serves as note for discharge by case management. Patient Name: Janine Garg                   YOB: 1935  Diagnosis: Delirium [R41.0]  Elevated troponin [R77.8]  Acute cystitis with hematuria [N30.01]  Severe sepsis ([address] [A41.9, R65.20]  Nausea and vomiting, unspecified vomiting type [R11.2]                   Date / Time: 2/24/2023 10:56 AM    Patient Admission Status: Inpatient   Readmission Risk (Low < 19, Mod (19-27), High > 27): Readmission Risk Score: 19.6    Current PCP: Ishmael Milligan MD  PCP verified by CM? Yes    Chart Reviewed: Yes      History Provided by: Child/Family (Daughter Rex Cheney)  Patient Orientation: Other (see comment) (Spoke with patient's daughter Theorwandy Ebbs)    Patient Cognition: Other (see comment) (Spoke with patient's daughter Theora Ebbs)    Hospitalization in the last 30 days (Readmission):  No    If yes, Readmission Assessment in CM Navigator will be completed.     Advance Directives:      Code Status: Full Code   Patient's Primary Decision Maker is: Legal Next of Kin    Primary Decision MakerDiallo Hurtado - [phone]    Discharge Planning:    Patient lives with: Children Type of Home: Skilled Nursing Facility  Primary Care Giver: Family  Patient Support Systems include: Children, Family Members, Home Care Staff   Current Financial resources: Medicare  Current community resources: ECF/Home Care  Current services prior to admission: Home Care            Current DME:              Type of Home Care services:  [place], PT, Nursing Services    ADLS  Prior functional level: Assistance with the following:, Bathing, Dressing, Cooking, Housework, Shopping, Mobility  Current functional level: Assistance with the following:, Bathing, Dressing, Toileting, Cooking, Housework, Shopping, Mobility    PT AM-PAC:   /24  OT AM-PAC:   /24    Family can provide assistance at DC: Yes  Would you like Case Management to discuss the discharge plan with any other family members/significant others, and if so, who? No  Plans to Return to Present Housing: Unknown at present  Other Identified Issues/Barriers to RETURNING to current housing: Patient's daughter & son-in-law unsure they can continue to meet patient's needs at home   Potential Assistance needed at discharge: Albert Li            Potential DME: May need a hospital bed if returns to home   Patient expects to discharge to: [facility] [address] for transportation at discharge: Medical     Financial    Payor: MEDICARE / Plan: MEDICARE PART A AND B / Product Type: *No Product type* /     Does insurance require precert for SNF: No    Potential assistance Purchasing Medications: No  Meds-to-Beds request: Yes      [id number] [address], [place] [phone] - F [phone]  [facility] [address]  Phone: [phone] Fax: [phone]      Notes:    Factors facilitating achievement of predicted outcomes: Family support    Barriers to discharge: Severe sepsis & weakness     Additional Case Management Notes: CM spoke with the patient's daughter Carmen Lloyd ([phone]) on the phone to initiate discharge planning. Patient lives with her daughter Troy Hallman and her son-in-law, has medical insurance with Rx coverage & PCP, required assistance with ADLs prior to admission, and no longer drives (family provides transportation as needed). Dixonindigo Hallman advised that the patient normally was able to stand & pivot to transfer self from his wheelchair to other surfaces or commode. However, he has gotten much weaker and Troy Hallman does not believe the patient would be able to transfer himself. The patient has an oxygen concentrator at home and wore oxygen 24 hours/day (3 l/nc).   Troy Hallman reported that the patient normally slept in the recliner but she feels that if the patient returns to her home he would need a hospital bed. Patient was receiving [facility] [address] services weekly which included nursing 1 day/week, PT 1 day/week, and a bath aide 2 days/week, Billye Lennox believes the [place] [address] agency is through [facility]. Billye Lennox feels that the patient will likely need to be placed in a skilled facility for therapy and is concerned about her and her 's ability to meet his needs in the home setting while he is so weak. Billye Lennox stated that her first choice would be [facility] if they have beds available. The patient's insurance does not require pre-certification. PT/OT evaluations are pending at this time. CM will follow. The Plan for Transition of Care is related to the following treatment goals of Delirium [R41.0]  Elevated troponin [R77.8]  Acute cystitis with hematuria [N30.01]  Severe sepsis ([address] [A41.9, R65.20]  Nausea and vomiting, unspecified vomiting type [Q71.0]    IF APPLICABLE: The Patient and/or patient representative Junior Larry and his family were provided with a choice of provider and agrees with the discharge plan. Freedom of choice list with basic dialogue that supports the patient's individualized plan of care/goals and shares the quality data associated with the providers was provided to: Patient   Patient Representative Name:       The Patient and/or Patient Representative Agree with the Discharge Plan?       Kasi Reyes RN  Case Management Department  Ph:  Fax:

## 2023-02-27 NOTE — CONSULTS
Infectious Disease Consult Note  2023   Patient Name: Kylie Faria : 1935   Impression  Morganella morganii Bacteremia Secondary to Complicated UTI:  Altered Mental Status: Resolved  Afebrile with no leukocytosis  CrCl 32  No allergies to ABX reported  Pct 0.576, 0.452  -BC 1/ Morganella morganii  : UA , RBC 13   Urine Culture:  Morganella morganii    JESSICA on CKD3:  Dr. David Araujo onboard  HTN/ PAF/ Chronic HFrEF:  Vomiting: Resolved  Dr. Sukhdeep Maravilla onboard  Rec PPI trial and EGD as OP  Legally Blind:  Multi-morbidity: per PMHx    Plan:  Continue IV Zosyn 3,375 mg q8h  Trend CRP and Pct, ordered  Await BC sensi, plan to DC on oral Bactrim, if sensitive    Thank you for allowing me to consult in the care of this patient.  ------------------------  REASON FOR CONSULT: Infective syndrome \"BCx has enterobacter\"  Requested by: Dr. Lashae Restrepo is a 80 y.o.  male with a history of CKD, HTN, PAF, chronic HFrEF, and legally blind who was admitted 2023 for further evaluation and management of intractable vomiting and AMS. He has recently underwent a cystoscopy 3 days prior to admission at [facility] and since the procedure he developed urinary urgency and frequency. He presented with tachycardia and requiring oxygen per NC. He was initially started on IV ceftriaxone. BC returned growing  Morganella morganii. Urine culture grew Morganella morganii. He was then transitioned to IV Zosyn on 23. CXR and CT of the head WO Contrast showed no acute process. US retroperitoneal showed no hydronephrosis. Reports his urinary urgency and frequency has improved, denies CVA and/or supra-pubic tenderness. ? Infectious diseases service was consulted to evaluate the pt, and recommend further investigative and therapeutic measures.   ROS: Other systems reviewed Including eyes, ENT, respiratory, cardiovascular, GI, , dermatologic, neurologic, psych, hem/lymphatic, musculoskeletal and endocrine were negative other than what is mentioned above. Patient Active Problem List    Diagnosis Date Noted    Severe sepsis ([facility] 02/24/2023    Stage 3b chronic kidney disease ([facility] 11/17/2022    Urinary frequency 11/17/2022    CKD (chronic kidney disease) stage 4, GFR 15-29 ml/min ([facility]) 09/27/2022    Chronic systolic (congestive) heart failure 09/27/2022    Ischemic cardiomyopathy 09/27/2022    LBBB (left bundle branch block) 08/22/2022    SOB (shortness of breath) 08/22/2022    Paroxysmal atrial fibrillation ([facility] 08/22/2022    Acute on chronic diastolic congestive heart failure ([facility] 08/22/2022    Stage 3a chronic kidney disease ([facility] 08/22/2022    Legally blind 08/22/2022    Precordial pain 08/22/2022    BPH (benign prostatic hyperplasia) 02/16/2022    Hypertension 02/16/2022     Past Medical History:   Diagnosis Date    Chronic kidney disease     Heart abnormality     Hypertension     UTI (urinary tract infection)       History reviewed. No pertinent surgical history. Family History   Problem Relation Age of Onset    Kidney Disease Mother     Hypertension Mother     Diabetes Mother     Diabetes Granddaughter     Hypertension Daughter     Cancer Daughter       Infectious disease related family history - not contibutory. SOCIAL HISTORY  Social History     Tobacco Use    Smoking status: Never    Smokeless tobacco: Never   Substance Use Topics    Alcohol use: Never      Born: [place]  Lives: [place], [place] with daughter  Occupation: Retired   No recent travel of significance. No recent unusual exposures. Pets: 3 dogs  ? ALLERGIES  No Known Allergies   MEDICATIONS  Reviewed and are per the chart/EMR. ?   Antibiotics:   Present:  Zosyn 2/26-  Past:  Ceftriaxone 2/24-26?  -------------------------------------------------------------------------------------------------------------------    Vital Signs:  Vitals:    02/27/23 0502   BP: (!) 148/100   Pulse: 96   Resp: 23 Temp:    SpO2:          Exam:    VS: noted; wt 214 lb (97.1 kg) Height 6'4\"  Gen: alert and oriented X3, no distress  HEMT: AT/NC Oropharynx pink, moist, and without lesions or exudates; dentition in good state of repair, legally blind  Eyes: PERRLA, EOMI, conjunctiva pink, sclera anicteric. Neck: Supple. Trachea midline. No LAD. Chest: no distress and CTA. Good air movement. Oxygen per NC  Heart: Afib and no MRG. Abd: soft, non-distended, no tenderness, no hepatomegaly. Normoactive bowel sounds. Ext: no clubbing, cyanosis, or edema  Neuro: Mental status intact. CN 2-12 intact and no focal sensory or motor deficits    ? Diagnostic Studies: reviewed  2/24/2023 XR Chest Portable:     Impression   No acute process. 2/24/2023 CT Head WO Contrast:  Impression   No acute intracranial abnormality. 2/25/2023 US Retroperitoneal Limited:  Impression   No hydronephrosis. ??  I have examined this patient and available medical records on this date and have made the above observations, conclusions and recommendations. Electronically signed by: Electronically signed by Domi Reyes.  HILLARY Dinh CNP on 2/27/2023 at 9:43 AM

## 2023-02-28 LAB
CRP SERPL HS-MCNC: 37 MG/L
CULTURE: ABNORMAL
CULTURE: ABNORMAL
EKG ATRIAL RATE: 94 BPM
EKG DIAGNOSIS: NORMAL
EKG P AXIS: 94 DEGREES
EKG P-R INTERVAL: 192 MS
EKG Q-T INTERVAL: 424 MS
EKG QRS DURATION: 152 MS
EKG QTC CALCULATION (BAZETT): 530 MS
EKG R AXIS: 21 DEGREES
EKG T AXIS: 231 DEGREES
EKG VENTRICULAR RATE: 94 BPM
INR BLD: 1.86 INDEX
Lab: ABNORMAL
PROCALCITONIN SERPL-MCNC: 0.27 NG/ML
PROTHROMBIN TIME: 24.1 SECONDS (ref 11.7–14.5)
SPECIMEN: ABNORMAL

## 2023-02-28 PROCEDURE — 36415 COLL VENOUS BLD VENIPUNCTURE: CPT

## 2023-02-28 PROCEDURE — 6360000002 HC RX W HCPCS

## 2023-02-28 PROCEDURE — 86140 C-REACTIVE PROTEIN: CPT

## 2023-02-28 PROCEDURE — 85610 PROTHROMBIN TIME: CPT

## 2023-02-28 PROCEDURE — 6370000000 HC RX 637 (ALT 250 FOR IP): Performed by: NURSE PRACTITIONER

## 2023-02-28 PROCEDURE — 94761 N-INVAS EAR/PLS OXIMETRY MLT: CPT

## 2023-02-28 PROCEDURE — 1200000000 HC SEMI PRIVATE

## 2023-02-28 PROCEDURE — 2700000000 HC OXYGEN THERAPY PER DAY

## 2023-02-28 PROCEDURE — 2580000003 HC RX 258: Performed by: STUDENT IN AN ORGANIZED HEALTH CARE EDUCATION/TRAINING PROGRAM

## 2023-02-28 PROCEDURE — 6370000000 HC RX 637 (ALT 250 FOR IP): Performed by: STUDENT IN AN ORGANIZED HEALTH CARE EDUCATION/TRAINING PROGRAM

## 2023-02-28 PROCEDURE — 2580000003 HC RX 258

## 2023-02-28 PROCEDURE — 6370000000 HC RX 637 (ALT 250 FOR IP): Performed by: FAMILY MEDICINE

## 2023-02-28 PROCEDURE — 6370000000 HC RX 637 (ALT 250 FOR IP): Performed by: INTERNAL MEDICINE

## 2023-02-28 PROCEDURE — 93005 ELECTROCARDIOGRAM TRACING: CPT | Performed by: STUDENT IN AN ORGANIZED HEALTH CARE EDUCATION/TRAINING PROGRAM

## 2023-02-28 PROCEDURE — 99232 SBSQ HOSP IP/OBS MODERATE 35: CPT | Performed by: NURSE PRACTITIONER

## 2023-02-28 PROCEDURE — 84145 PROCALCITONIN (PCT): CPT

## 2023-02-28 PROCEDURE — 93010 ELECTROCARDIOGRAM REPORT: CPT | Performed by: INTERNAL MEDICINE

## 2023-02-28 PROCEDURE — 99233 SBSQ HOSP IP/OBS HIGH 50: CPT | Performed by: INTERNAL MEDICINE

## 2023-02-28 RX ORDER — WARFARIN SODIUM 6 MG/1
6 TABLET ORAL
Status: COMPLETED | OUTPATIENT
Start: 2023-02-28 | End: 2023-02-28

## 2023-02-28 RX ORDER — SULFAMETHOXAZOLE AND TRIMETHOPRIM 800; 160 MG/1; MG/1
1 TABLET ORAL EVERY 12 HOURS SCHEDULED
Status: DISCONTINUED | OUTPATIENT
Start: 2023-02-28 | End: 2023-03-01 | Stop reason: HOSPADM

## 2023-02-28 RX ORDER — WARFARIN SODIUM 5 MG/1
5 TABLET ORAL DAILY
Status: DISCONTINUED | OUTPATIENT
Start: 2023-03-01 | End: 2023-03-01

## 2023-02-28 RX ORDER — 0.9 % SODIUM CHLORIDE 0.9 %
500 INTRAVENOUS SOLUTION INTRAVENOUS ONCE
Status: DISCONTINUED | OUTPATIENT
Start: 2023-02-28 | End: 2023-02-28

## 2023-02-28 RX ADMIN — DOXAZOSIN 1 MG: 1 TABLET ORAL at 20:16

## 2023-02-28 RX ADMIN — FAMOTIDINE 20 MG: 20 TABLET ORAL at 20:16

## 2023-02-28 RX ADMIN — CARVEDILOL 12.5 MG: 6.25 TABLET, FILM COATED ORAL at 18:24

## 2023-02-28 RX ADMIN — PIPERACILLIN AND TAZOBACTAM 2250 MG: 2; .25 INJECTION, POWDER, FOR SOLUTION INTRAVENOUS at 05:16

## 2023-02-28 RX ADMIN — CARVEDILOL 12.5 MG: 6.25 TABLET, FILM COATED ORAL at 08:40

## 2023-02-28 RX ADMIN — CALCIUM CARBONATE 500 MG: 500 TABLET, CHEWABLE ORAL at 18:24

## 2023-02-28 RX ADMIN — PANTOPRAZOLE SODIUM 40 MG: 40 TABLET, DELAYED RELEASE ORAL at 06:35

## 2023-02-28 RX ADMIN — Medication 6 MG: at 20:41

## 2023-02-28 RX ADMIN — SODIUM CHLORIDE, PRESERVATIVE FREE 10 ML: 5 INJECTION INTRAVENOUS at 08:41

## 2023-02-28 RX ADMIN — ISOSORBIDE DINITRATE 30 MG: 20 TABLET ORAL at 08:40

## 2023-02-28 RX ADMIN — SULFAMETHOXAZOLE AND TRIMETHOPRIM 1 TABLET: 800; 160 TABLET ORAL at 20:16

## 2023-02-28 RX ADMIN — PIPERACILLIN AND TAZOBACTAM 2250 MG: 2; .25 INJECTION, POWDER, FOR SOLUTION INTRAVENOUS at 11:27

## 2023-02-28 RX ADMIN — WARFARIN SODIUM 6 MG: 6 TABLET ORAL at 18:24

## 2023-02-28 RX ADMIN — SODIUM CHLORIDE, PRESERVATIVE FREE 10 ML: 5 INJECTION INTRAVENOUS at 20:16

## 2023-02-28 ASSESSMENT — ENCOUNTER SYMPTOMS
COUGH: 0
ABDOMINAL PAIN: 0
SHORTNESS OF BREATH: 0
SINUS PRESSURE: 0
BACK PAIN: 1
VOICE CHANGE: 0
EYE PAIN: 0
NAUSEA: 0
DIARRHEA: 0
VOMITING: 0
BLOOD IN STOOL: 0
SINUS PAIN: 0
EYE DISCHARGE: 0
CHEST TIGHTNESS: 0

## 2023-02-28 NOTE — CARE COORDINATION
This CM received a call from [address] [place] [address],[address] at [facility] stating that they are agreeable to accept the patient once deemed medically stable for discharge. This CM notified the [facility] .

## 2023-02-28 NOTE — PROGRESS NOTES
Nephrology Progress Note  2/28/2023 10:41 AM  Subjective: Interval History: Isaias Boykin is a 80 y.o. male appears to be more stable interactive today and less abdominal discomfort      Data:   Scheduled Meds:   [START ON 3/1/2023] warfarin  5 mg Oral Daily    warfarin  6 mg Oral Once    pantoprazole  40 mg Oral QAM AC    piperacillin-tazobactam  2,250 mg IntraVENous Q6H    famotidine  20 mg Oral Nightly    sodium chloride flush  5-40 mL IntraVENous 2 times per day    doxazosin  1 mg Oral Nightly    [Held by provider] sacubitril-valsartan  1 tablet Oral BID    isosorbide dinitrate  30 mg Oral Daily    [Held by provider] furosemide  40 mg Oral Every Other Day    carvedilol  12.5 mg Oral BID WC     Continuous Infusions:   sodium chloride 25 mL (02/25/23 0913)         CBC   Recent Labs     02/26/23 0030 02/27/23 0756   WBC 11.8* 8.2   HGB 9.9* 10.3*   HCT 33.2* 34.4*   * 141      BMP   Recent Labs     02/26/23 0030 02/27/23  0756    140   K 4.0 4.2    100   CO2 29 29   BUN 45* 43*   CREATININE 2.6* 2.0*     Hepatic:   Recent Labs     02/26/23 0030 02/27/23  0756   AST 24 19   ALT 7* 8*   BILITOT 0.4 0.2   ALKPHOS 64 57     Troponin: No results for input(s): TROPONINI in the last 72 hours. BNP: No results for input(s): BNP in the last 72 hours. Lipids: No results for input(s): CHOL, HDL in the last 72 hours.     Invalid input(s): LDLCALCU  ABGs: No results found for: PHART, PO2ART, BVK5WZL  INR:   Recent Labs     02/26/23 0030 02/27/23 0756 02/28/23  0706   INR 1.72 1.84 1.86     Renal Labs  Albumin:    Lab Results   Component Value Date/Time    LABALBU 3.2 02/27/2023 07:56 AM     Calcium:    Lab Results   Component Value Date/Time    CALCIUM 7.4 02/27/2023 07:56 AM     Phosphorus:    Lab Results   Component Value Date/Time    PHOS 3.0 01/13/2023 02:42 PM     U/A:    Lab Results   Component Value Date/Time    NITRU NEGATIVE 02/24/2023 01:00 PM    COLORU YELLOW 02/24/2023 01:00 PM WBCUA 270 02/24/2023 01:00 PM    RBCUA 13 02/24/2023 01:00 PM    MUCUS RARE 02/24/2023 01:00 PM    TRICHOMONAS NONE SEEN 02/24/2023 01:00 PM    BACTERIA MODERATE 02/24/2023 01:00 PM    CLARITYU SLIGHTLY CLOUDY 02/24/2023 01:00 PM    SPECGRAV <1.005 02/24/2023 01:00 PM    UROBILINOGEN 1.0 02/24/2023 01:00 PM    BILIRUBINUR NEGATIVE 02/24/2023 01:00 PM    BLOODU SMALL NUMBER OR AMOUNT OBSERVED 02/24/2023 01:00 PM    KETUA NEGATIVE 02/24/2023 01:00 PM     ABG:  No results found for: PHART, PGH9HAR, PO2ART, OZL5YRK, BEART, THGBART, XHO3MLK, R1QRUPTH  HgBA1c:  No results found for: LABA1C  Microalbumen/Creatinine ratio:  No components found for: RUCREAT  TSH:  No results found for: TSH  IRON:  No results found for: IRON  Iron Saturation:  No components found for: PERCENTFE  TIBC:  No results found for: TIBC  FERRITIN:  No results found for: FERRITIN  RPR:  No results found for: RPR  ADRIAN:  No results found for: ANATITER, ADRIAN  24 Hour Urine for Creatinine Clearance:  No components found for: CREAT4, UHRS10, UTV10      Objective:   I/O: 02/27 0701 - 02/28 0700  In: -   Out: 600 [Urine:600]  I/O last 3 completed shifts:  In: -   Out: 950 [Urine:950]  No intake/output data recorded. Vitals: /69   Pulse 60   Temp 97.4 °F (36.3 °C) (Oral)   Resp 24   Ht 6' 4\" (1.93 m)   Wt 256 lb (116.1 kg)   SpO2 98%   BMI 31.16 kg/m²  {  General appearance: awake weak legally blind  HEENT: Head: Normal, normocephalic, atraumatic.   Neck: supple, symmetrical, trachea midline  Lungs: diminished breath sounds bilaterally  Heart: S1, S2 normal  Abdomen: abnormal findings:  soft nt  Extremities: edema trace  Neurologic: Mental status: alertness: alert        Assessment and Plan:      IMP:  1 ckd 4 with arf  2 uti  3 icm ef 20-25%  4 legal blind  5 paf    Plan     #1 creatinine holding 2.0 improved to baseline  #2 treated for UTI no fever  #3 cardiac and volume status stable good urine output  #4 aware legally blind  #5 heart rate controlled stable supportive care monitor hemoglobin  We will follow in the above setting no acute changes           Candi Ziegler MD, MD Post-Care Instructions: I reviewed with the patient in detail post-care instructions. Patient is not to engage in any heavy lifting, exercise, or swimming for the next 14 days. Should the patient develop any fevers, chills, bleeding, severe pain patient will contact the office immediately.

## 2023-02-28 NOTE — CONSULTS
PHARMACY ANTICOAGULATION MONITORING SERVICE    Glenn Ott is a 80 y.o. male on warfarin therapy for Afib. Pharmacy consulted by Dr. Gordon Oliver for monitoring and adjustment of treatment. Indication for anticoagulation: AFib  INR goal: 2-3  Warfarin dose prior to admission: 5 mg daily    Pertinent Laboratory Values   Recent Labs     02/26/23  0030 02/27/23  0756 02/28/23  0706   INR 1.72 1.84 1.86   HGB 9.9* 10.3*  --    HCT 33.2* 34.4*  --    * 141  --          Assessment/Plan:  Drug Interactions: Zosyn, prn acetaminophen  INR continues to trend up, but has leveled off  Administer a booster dose of warfarin 6mg x1 dose this evening given continued subtherapeutic INR levels then resume 5mg daily dosing  Pharmacy will continue to monitor and adjust warfarin therapy as indicated    Thank you for the consult.   [address], [facility]  2/28/2023 9:23 AM

## 2023-02-28 NOTE — PROGRESS NOTES
V2.0  [facility] Hospitalist Progress Note      Name:  Audra Lee /Age/Sex: 1935  (80 y.o. male)   MRN & CSN:  [id number] & [id number] Encounter Date/Time: 2023 12:16 PM EST    Location:  -A PCP: Kaycee Fletcher MD       Hospital Day: 5    Assessment and Plan:   Audra Lee is a 80 y.o. male with pmh of hypertension, paroxysmal atrial fibrillation, chronic HFrEF, chronic respiratory failure on 2-3 L of O2 on and off, CKD stage III a and legally blind who presents with Severe sepsis ([address]    Medically stable for DC. Plan:  Severe sepsis with acute encephalopathy in the setting of suspected UTI with intractable nausea and vomiting: UA positive for leukocyte esterase hematuria pyuria and bacteriuria. Blood cultures were drawn in which 1 out of 4 bottles are showing Enterobacter species. Urine and blood culture shows Morganella. Was on IV antibiotics Rocephin, changed to zosyn IV. Now DC Zosyn and start Bactrim per ID. Delirium precautions. Runs of SVT vs Afib? Normal rate. Will monitor overnight. EF 20%. Cardio consulted. GDMT. CHRONIC HYPOXIC RESPIRATORY FAILURE with suspected 2 to 3 L oxygen at home. JESSICA on CKD stage III AA: Baseline creatinine of around 2.1. Most recent creatinine is 2.6. Avoid nephrotoxic agents continue to monitor. Nephrology on board  Elevated troponin likely demand ischemia in the setting of underlying CKD stage IIIa  Benign essential hypertension, home medication included Coreg 12.5 mg twice daily, Entresto Isordil 30 mg daily Cardura 1 mg at night and Lasix 40 mg every other day. Currently holding Entresto and Lasix due to JESSICA. 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   Most recent stress test showed a large anterior septal defect was on Entresto Coreg Imdur and Lasix. Continue to monitor. Paroxysmal atrial fibrillation on Coumadin  Legally blind continue fall precautions. Diet ADULT DIET; Regular; Low Fat/Low Chol/High Fiber/PRISCILLA   DVT Prophylaxis [] Lovenox, []  Heparin, [] SCDs, [] Ambulation,  [] Eliquis, [] Xarelto  [] Coumadin   Code Status Full Code   Disposition From: Home  Expected Disposition: home  Estimated Date of Discharge: tomorrow  Patient requires continued admission due to Runs of SVT vs Afib? Normal rate. Will monitor overnight. EF 20%. Cardio consulted. Once cleared by cardio, will DC tomorrow. .   Surrogate Decision Maker/ POA      Subjective:     Chief Complaint: Emesis and Fatigue     Patient was seen at the bedside patient is legally blind. All questions were answered at bedside. Runs of SVT vs Afib? Normal rate. Will monitor overnight. EF 20%. Cardio consulted. Once cleared by cardio, will DC tomorrow. Abx changed to PO bactrim after talking to ID  Review of Systems:    Review of Systems   Constitutional:  Positive for activity change and appetite change. Negative for chills, fever and unexpected weight change. HENT:  Negative for ear pain, hearing loss, sinus pressure, sinus pain and voice change. Eyes:  Positive for visual disturbance. Negative for pain and discharge. Respiratory:  Negative for cough, chest tightness and shortness of breath. Cardiovascular:  Negative for chest pain, palpitations and leg swelling. Gastrointestinal:  Negative for abdominal pain, blood in stool, diarrhea, nausea and vomiting. Genitourinary:  Negative for dysuria and frequency. Musculoskeletal:  Positive for arthralgias and back pain. Neurological:  Positive for weakness. Negative for dizziness, light-headedness and headaches. Psychiatric/Behavioral:  Positive for decreased concentration and sleep disturbance. Objective:      Intake/Output Summary (Last 24 hours) at 2/28/2023 1341  Last data filed at 2/28/2023 0622  Gross per 24 hour Intake --   Output 600 ml   Net -600 ml          Vitals:   Vitals:    02/28/23 0600   BP: 137/69   Pulse: 60   Resp: 24   Temp:    SpO2: 98%       Physical Exam:   Physical Exam  Vitals reviewed. Constitutional:       Appearance: Normal appearance. He is normal weight. He is ill-appearing. HENT:      Head: Normocephalic. Right Ear: Tympanic membrane normal.      Left Ear: Tympanic membrane normal.      Nose: Nose normal.      Mouth/Throat:      Mouth: Mucous membranes are moist.   Eyes:      Conjunctiva/sclera: Conjunctivae normal.      Pupils: Pupils are equal, round, and reactive to light. Cardiovascular:      Rate and Rhythm: Regular rhythm. Tachycardia present. Pulses: Normal pulses. Heart sounds: Normal heart sounds. No murmur heard. Pulmonary:      Effort: Pulmonary effort is normal.      Breath sounds: Normal breath sounds. No wheezing, rhonchi or rales. Abdominal:      General: Abdomen is flat. Bowel sounds are normal. There is no distension. Palpations: Abdomen is soft. Tenderness: There is no abdominal tenderness. Musculoskeletal:         General: No deformity. Normal range of motion. Cervical back: Normal range of motion and neck supple. Right lower leg: Edema present. Left lower leg: Edema present. Skin:     General: Skin is dry. Coloration: Skin is not jaundiced or pale. Neurological:      General: No focal deficit present. Mental Status: He is alert and oriented to person, place, and time. Mental status is at baseline. Motor: Weakness present.    Psychiatric:         Mood and Affect: Mood normal.         Behavior: Behavior normal.          Medications:   Medications:    [START ON 3/1/2023] warfarin  5 mg Oral Daily    warfarin  6 mg Oral Once    sulfamethoxazole-trimethoprim  1 tablet Oral 2 times per day    pantoprazole  40 mg Oral QAM AC    famotidine  20 mg Oral Nightly    sodium chloride flush  5-40 mL IntraVENous 2 times per day    doxazosin  1 mg Oral Nightly    [Held by provider] sacubitril-valsartan  1 tablet Oral BID    isosorbide dinitrate  30 mg Oral Daily    [Held by provider] furosemide  40 mg Oral Every Other Day    carvedilol  12.5 mg Oral BID WC      Infusions:    sodium chloride 25 mL (02/25/23 0913)     PRN Meds: calcium carbonate, 500 mg, TID PRN  melatonin, 6 mg, Nightly PRN  guaiFENesin, 200 mg, Q4H PRN  sodium chloride, , PRN  acetaminophen, 650 mg, Q6H PRN   Or  acetaminophen, 650 mg, Q6H PRN      Labs      Recent Results (from the past 24 hour(s))   Protime-INR    Collection Time: 02/28/23  7:06 AM   Result Value Ref Range    Protime 24.1 (H) 11.7 - 14.5 SECONDS    INR 1.86 INDEX   C-Reactive Protein    Collection Time: 02/28/23  7:06 AM   Result Value Ref Range    CRP High Sensitivity 37.0 (H) <5.0 mg/L   Procalcitonin    Collection Time: 02/28/23  7:06 AM   Result Value Ref Range    Procalcitonin 0.265    EKG 12 Lead    Collection Time: 02/28/23 12:34 PM   Result Value Ref Range    Ventricular Rate 94 BPM    Atrial Rate 94 BPM    P-R Interval 192 ms    QRS Duration 152 ms    Q-T Interval 424 ms    QTc Calculation (Bazett) 530 ms    P Axis 94 degrees    R Axis 21 degrees    T Axis 231 degrees    Diagnosis       Sinus rhythm with premature atrial complexes with aberrant conduction  Left bundle branch block  Abnormal ECG  When compared with ECG of 24-FEB-2023 13:01,  T wave inversion more evident in Inferior leads  T wave inversion no longer evident in Lateral leads          Imaging/Diagnostics Last 24 Hours   CT HEAD WO CONTRAST    Result Date: 2/24/2023  EXAMINATION: CT OF THE HEAD WITHOUT CONTRAST  2/24/2023 12:22 pm TECHNIQUE: CT of the head was performed without the administration of intravenous contrast. Automated exposure control, iterative reconstruction, and/or weight based adjustment of the mA/kV was utilized to reduce the radiation dose to as low as reasonably achievable. COMPARISON: None.  HISTORY: ORDERING SYSTEM PROVIDED HISTORY: Confusion FINDINGS: BRAIN/VENTRICLES:  No evidence of an acute infarct or other acute parenchymal process. No evidence of acute intracranial hemorrhage. There is no evidence of an intracranial mass or extraaxial fluid collection. No significant mass effect or midline shift. There is mild generalized volume loss. Ventricular enlargement concordant with the degree of parenchymal volume loss. Scattered patchy foci of low attenuation are present within supratentorial white matter which is a nonspecific finding but likely represents mild chronic microvascular ischemia. Remote lacunar infarcts throughout the bilateral basal ganglia. ORBITS: The visualized portion of the orbits demonstrate no acute abnormality. Right scleral banding. Left lens extraction. SINUSES:  The visualized paranasal sinuses and mastoid air cells demonstrate no acute abnormality. SOFT TISSUES/SKULL: No acute abnormality of the visualized skull or soft tissues. No acute intracranial abnormality. XR CHEST PORTABLE    Result Date: 2/24/2023  EXAMINATION: ONE XRAY VIEW OF THE CHEST 2/24/2023 12:08 pm COMPARISON: 1/31/2023, 9/7/2022 HISTORY: ORDERING SYSTEM PROVIDED HISTORY: Confusion TECHNOLOGIST PROVIDED HISTORY: Reason for exam:->Confusion Reason for Exam: Confusion FINDINGS: The cardiomediastinal silhouette is unchanged. Bibasilar streaky opacities persist, favoring scarring or atelectasis. No new focal consolidation, pneumothorax, or pleural effusion. Osseous structures are diffusely demineralized and grossly unchanged. No acute process. US RETROPERITONEAL LIMITED    Result Date: 2/25/2023  EXAMINATION: ULTRASOUND OF THE KIDNEYS 2/25/2023 11:20 am COMPARISON: None. HISTORY: ORDERING SYSTEM PROVIDED HISTORY: JESSICA on CKDIII TECHNOLOGIST PROVIDED HISTORY: Reason for exam:->JESSICA on CKDIII FINDINGS: The right kidney measures 9.6 cm in length and the left kidney measures 9.6 cm in length.  Kidneys demonstrate normal cortical echogenicity. No hydronephrosis or intrarenal stones. No focal lesions. No hydronephrosis.      Electronically signed by Amor Gonzales MD on 2/28/2023 at 1:41 PM

## 2023-02-28 NOTE — CARE COORDINATION
BALAJI spoke with Andreia Blair regarding pt's DC. Informed Mavis that it's a possible DC for tomorrow.

## 2023-02-28 NOTE — PROGRESS NOTES
Infectious Disease Progress Note  2023   Patient Name: Álvaro Pozo : 1935   Impression  Morganella morganii Bacteremia Secondary to Complicated UTI:  Altered Mental Status: Resolved  Afebrile with no leukocytosis, patient has improved clinically  CrCl 36  No allergies to ABX reported  Pct 0.576, 0.452, 0.265  CRP 37  -BC  Morganella morganii  : UA , RBC 13   Urine Culture:  Morganella morganii     JESSICA on CKD3:  Dr. Leighann Thomas onboard  HTN/ PAF/ Chronic HFrEF:  Vomiting: Resolved  Dr. Yandel Pina onboard  Rec PPI trial and EGD as OP  Legally Blind:  Multi-morbidity: per PMHx     Plan:  DC IV Zosyn as BC sensi has returned  Start po Bactrim 1 DS bid x cumulative 14 days (end date 3/12/2023), CrCl 36  Labs drawn on Monday during the course of treatment (3/6/2023)  CBC with differential, CMP, CRP  Fax results to Attn: Richard Camilo Infectious Diseases Staff  # [phone]   ID will follow labs  Follow up with PCP/ urology after DC  OK from ID standpoint to DC when ready    Ongoing Antimicrobial Therapy  Bactrim -  Completed Antimicrobial Therapy  Ceftriaxone -?? Zosyn -? History:? Interval history noted. Chief complaint: Morganella morganii bacteremia from complicated UTI. Denies n/v/d/f or untoward effects of antibiotics  Physical Exam:  Vital Signs: /69   Pulse 60   Temp 97.4 °F (36.3 °C) (Oral)   Resp 24   Ht 6' 4\" (1.93 m)   Wt 256 lb (116.1 kg)   SpO2 98%   BMI 31.16 kg/m²     Gen: A&O x 3, pleasant, no distress  HEMT: AT/NC Oropharynx pink, moist, and without lesions or exudates; dentition in good state of repair, legally blind  Chest: no distress and CTA. Good air movement. Oxygen per NC  Heart: Afib and no MRG. Abd: soft, non-distended, no tenderness, no hepatomegaly. Normoactive bowel sounds. Ext: no clubbing, cyanosis, or edema  Neuro: Mental status intact.  CN 2-12 intact and no focal sensory or motor deficits     Radiologic / Imaging / TESTING  No results found. Labs:    Recent Results (from the past 24 hour(s))   Protime-INR    Collection Time: 02/28/23  7:06 AM   Result Value Ref Range    Protime 24.1 (H) 11.7 - 14.5 SECONDS    INR 1.86 INDEX   C-Reactive Protein    Collection Time: 02/28/23  7:06 AM   Result Value Ref Range    CRP High Sensitivity 37.0 (H) <5.0 mg/L   Procalcitonin    Collection Time: 02/28/23  7:06 AM   Result Value Ref Range    Procalcitonin 0.265      CULTURE results: Invalid input(s): BLOOD CULTURE,  URINE CULTURE, SURGICAL CULTURE    Diagnosis:  Patient Active Problem List   Diagnosis    BPH (benign prostatic hyperplasia)    Hypertension    LBBB (left bundle branch block)    SOB (shortness of breath)    Paroxysmal atrial fibrillation (HCC)    Acute on chronic diastolic congestive heart failure (HCC)    Stage 3a chronic kidney disease ([facility] [address]    Legally blind    Precordial pain    CKD (chronic kidney disease) stage 4, GFR 15-29 ml/min ([facility])    Chronic systolic (congestive) heart failure    Ischemic cardiomyopathy    Stage 3b chronic kidney disease ([facility] [address]    Urinary frequency    Severe sepsis (HCC)    Delirium    Bacterial infection due to Morganella morganii    Acute pyelonephritis       Active Problems  Principal Problem:    Severe sepsis (HCC)  Active Problems:    Delirium    Bacterial infection due to Morganella morganii    Acute pyelonephritis  Resolved Problems:    * No resolved hospital problems. *    Electronically signed by: Electronically signed by Edna Washburn.  HILLARY Dinh CNP on 2/28/2023 at 11:30 AM

## 2023-02-28 NOTE — DISCHARGE INSTR - COC
Continuity of Care Form    Patient Name: Meredith Maradiaga   :  1935  MRN:  [id number]    Admit date:  2023  Discharge date:  23    Code Status Order: Full Code   Advance Directives:     Admitting Physician:  No admitting provider for patient encounter. PCP: Shawn Nick MD    Discharging Nurse: April Moore Unit/Room#: -A  Discharging Unit Phone Number: [phone]    Emergency Contact:   Extended Emergency Contact Information  Primary Emergency Contact: [address] Phone: [phone]  Mobile Phone: [phone]  Relation: Child  Preferred language: English    Past Surgical History:  History reviewed. No pertinent surgical history. Immunization History: There is no immunization history on file for this patient.     Active Problems:  Patient Active Problem List   Diagnosis Code    BPH (benign prostatic hyperplasia) N40.0    Hypertension I10    LBBB (left bundle branch block) I44.7    SOB (shortness of breath) R06.02    Paroxysmal atrial fibrillation (HCC) I48.0    Acute on chronic diastolic congestive heart failure (HCC) I50.33    Stage 3a chronic kidney disease (HCC) N18.31    Legally blind H54.8    Precordial pain R07.2    CKD (chronic kidney disease) stage 4, GFR 15-29 ml/min ([facility]) N18.4    Chronic systolic (congestive) heart failure I50.22    Ischemic cardiomyopathy I25.5    Stage 3b chronic kidney disease (HCC) N18.32    Urinary frequency R35.0    Severe sepsis (HCC) A41.9, R65.20    Delirium R41.0    Bacterial infection due to Morganella morganii A49.8    Acute pyelonephritis N10       Isolation/Infection:   Isolation            Contact          Patient Infection Status       Infection Onset Added Last Indicated Last Indicated By Review Planned Expiration Resolved Resolved By    MDRO (multi-drug resistant organism) 23 Jose Cruz English, RN        HERNANDO CHAUDHARY [facility] - Urine            Nurse Assessment:  Last Vital Signs: BP 120/76   Pulse 83   Temp 98 °F (36.7 °C) (Oral)   Resp 17   Ht 6' 4\" (1.93 m)   Wt 256 lb (116.1 kg)   SpO2 98%   BMI 31.16 kg/m²     Last documented pain score (0-10 scale):    Last Weight:   Wt Readings from Last 1 Encounters:   02/28/23 256 lb (116.1 kg)     Mental Status:  oriented, alert, coherent, logical, thought processes intact, and able to concentrate and follow conversation    IV Access:  - None    Nursing Mobility/ADLs:  Walking   Dependent  Transfer  Assisted  Bathing  Dependent  Dressing  Dependent  Toileting  Dependent  Feeding  Assisted  Med Admin  Assisted  Med Delivery   whole    Wound Care Documentation and Therapy:  Wound 02/24/23 Buttocks Left;Right cluster, red (Active)   Wound Cleansed Soap and water 02/27/23 1005   Dressing/Treatment Protective barrier 02/27/23 1927   Wound Assessment Pink/red 02/27/23 1005   Drainage Amount None 02/27/23 1927   Odor None 02/27/23 1927   Nae-wound Assessment Fragile 02/27/23 1005   Margins Attached edges 02/27/23 1005   Number of days: 3        Elimination:  Continence:    Bowel: Yes  Bladder: Yes  Urinary Catheter: None   Colostomy/Ileostomy/Ileal Conduit: No       Date of Last BM: 03/01/23      Intake/Output Summary (Last 24 hours) at 2/28/2023 1505  Last data filed at 2/28/2023 4805  Gross per 24 hour   Intake --   Output 600 ml   Net -600 ml     I/O last 3 completed shifts:  In: -   Out: 950 [Urine:950]    Safety Concerns:     None    Impairments/Disabilities:      Vision    Patient's personal belongings (please select all that are sent with patient):  None    RN SIGNATURE:  Electronically signed by Ila Dixon RN on 3/1/23 at 11:12 AM EST    CASE MANAGEMENT/SOCIAL WORK SECTION    Inpatient Status Date: 2/24/2023    Readmission Risk Assessment Score:  Readmission Risk              Risk of Unplanned Readmission:  18           Discharging to Facility/ Agency   Name:   Address:  Phone:  Fax:    Dialysis Facility (if applicable) Name:  Address:  Dialysis Schedule:  Phone:  Fax:    / signature: {Esignature:[id number]}    PHYSICIAN SECTION    Prognosis: Good    Condition at Discharge: Stable    Rehab Potential (if transferring to Rehab): Good    Nutrition Therapy:  Current Nutrition Therapy:   - Oral Diet:  General    Routes of Feeding: Oral  Liquids: No Restrictions  Daily Fluid Restriction: no  Last Modified Barium Swallow with Video (Video Swallowing Test): not done    Treatments at the Time of Hospital Discharge:   Respiratory Treatments: 2 l nc  Oxygen Therapy:  is on oxygen at 2 L/min per nasal cannula. Ventilator:    - No ventilator support    Rehab Therapies: Physical Therapy  Weight Bearing Status/Restrictions: No weight bearing restrictions  Other Treatments: legally blind    Physician Certification: I certify the above information and transfer of Álvaro Pozo  is necessary for the continuing treatment of the diagnosis listed and that he requires [facility] for greater 30 days.      Update Admission H&P: No change in H&P    PHYSICIAN SIGNATURE:  Electronically signed by Amor Gonzales MD on 3/1/23 at 8:05 AM EST

## 2023-02-28 NOTE — CONSULTS
CARDIOLOGY CONSULT NOTE   Reason for consultation:  cardiomyopathy    Referring physician:  No admitting provider for patient encounter. Primary care physician: Miguelito Munoz MD      Dear  Dr. Hill Barksdale admitting provider for patient encounter. Thanks for the consult. Chief Complaints :  Chief Complaint   Patient presents with    Emesis    Fatigue        History of present illness:Duarte is a 80 y. o.year old who presents with fatigue and weakness sob mostly abdominal symptoms with vomiting and nausea found to have urinary tract infection with bacteremia complicated by renal failure,   On telemetry also had runs of SVT A-fib hence cardiology consulted  He is known to me from outpatient service due to severe cardiomyopathy ejection fraction of 25% with a large anterior wall defect on stress test he does have history of A-fib he is on chronic anticoagulation Coumadin medication is being titrated up with plans for ICD if EF does not improve left bundle branch block at baseline  Bnp Is very high but cxr is normal   Inspite of medical therapy his EF has not improved      Past medical history:    has a past medical history of Chronic kidney disease, Heart abnormality, Hypertension, and UTI (urinary tract infection). Past surgical history:   has no past surgical history on file. Social History:   reports that he has never smoked. He has never used smokeless tobacco. He reports that he does not drink alcohol and does not use drugs.   Family history:   no family history of CAD, STROKE of DM at early age    No Known Allergies    [START ON 3/1/2023] warfarin (COUMADIN) tablet 5 mg, Daily  warfarin (COUMADIN) tablet 6 mg, Once  sulfamethoxazole-trimethoprim (BACTRIM DS;SEPTRA DS) 800-160 MG per tablet 1 tablet, 2 times per day  pantoprazole (PROTONIX) tablet 40 mg, QAM AC  calcium carbonate (TUMS) chewable tablet 500 mg, TID PRN  famotidine (PEPCID) tablet 20 mg, Nightly  melatonin tablet 6 mg, Nightly PRN  guaiFENesin (ROBITUSSIN) 100 MG/5ML liquid 200 mg, Q4H PRN  sodium chloride flush 0.9 % injection 5-40 mL, 2 times per day  0.9 % sodium chloride infusion, PRN  acetaminophen (TYLENOL) tablet 650 mg, Q6H PRN   Or  acetaminophen (TYLENOL) suppository 650 mg, Q6H PRN  doxazosin (CARDURA) tablet 1 mg, Nightly  [Held by provider] sacubitril-valsartan (ENTRESTO) 49-51 MG per tablet 1 tablet, BID  isosorbide dinitrate (ISORDIL) tablet 30 mg, Daily  [Held by provider] furosemide (LASIX) tablet 40 mg, Every Other Day  carvedilol (COREG) tablet 12.5 mg, BID     Current Facility-Administered Medications   Medication Dose Route Frequency Provider Last Rate Last Admin    [START ON 3/1/2023] warfarin (COUMADIN) tablet 5 mg  5 mg Oral Daily Irma Ryan MD        warfarin (COUMADIN) tablet 6 mg  6 mg Oral Once Amina Pineda MD        sulfamethoxazole-trimethoprim (BACTRIM DS;SEPTRA DS) 800-160 MG per tablet 1 tablet  1 tablet Oral 2 times per day HILLARY Nagy - ANNIKA        pantoprazole (PROTONIX) tablet 40 mg  40 mg Oral QAM AC Natalee Sun MD   40 mg at 02/28/23 4523    calcium carbonate (TUMS) chewable tablet 500 mg  500 mg Oral TID PRN Grover Mattson MD   500 mg at 02/27/23 1017    famotidine (PEPCID) tablet 20 mg  20 mg Oral Nightly Grover Mattson MD   20 mg at 02/27/23 2135    melatonin tablet 6 mg  6 mg Oral Nightly PRN HILLARY Hutson CNP   6 mg at 02/27/23 2134    guaiFENesin (ROBITUSSIN) 100 MG/5ML liquid 200 mg  200 mg Oral Q4H PRN HILLARY Hutson - CNP   200 mg at 02/25/23 2157    sodium chloride flush 0.9 % injection 5-40 mL  5-40 mL IntraVENous 2 times per day Amina Pineda MD   10 mL at 02/28/23 0841    0.9 % sodium chloride infusion   IntraVENous PRN Amina Pineda  mL/hr at 02/25/23 0913 25 mL at 02/25/23 0913    acetaminophen (TYLENOL) tablet 650 mg  650 mg Oral Q6H PRN Amina Pineda MD        Or    acetaminophen (TYLENOL) suppository 650 mg  650 mg Rectal Q6H PRN Irma Ryan MD        doxazosin (CARDURA) tablet 1 mg  1 mg Oral Nightly Amina Pineda MD   1 mg at 02/27/23 2135    [Held by provider] sacubitril-valsartan (ENTRESTO) 49-51 MG per tablet 1 tablet  1 tablet Oral BID Amina Pineda MD   1 tablet at 02/25/23 0913    isosorbide dinitrate (ISORDIL) tablet 30 mg  30 mg Oral Daily Irma Ryan MD   30 mg at 02/28/23 0840    [Held by provider] furosemide (LASIX) tablet 40 mg  40 mg Oral Every Other Day Amina Pineda MD   40 mg at 02/25/23 0910    carvedilol (COREG) tablet 12.5 mg  12.5 mg Oral BID WC Amina Pineda MD   12.5 mg at 02/28/23 0840     Review of Systems:   Constitutional: No Fever or Weight Loss   Eyes: Legally blind  ENT: No Headaches, Hearing Loss or Vertigo  Cardiovascular: As per HPI  Respiratory: As per HPI  Gastrointestinal: No abdominal pain, appetite loss, blood in stools, constipation, diarrhea or heartburn  Genitourinary: No dysuria, trouble voiding, or hematuria  Musculoskeletal:  No gait disturbance, weakness or joint complaints  Integumentary: No rash or pruritis  Neurological: No TIA or stroke symptoms  Psychiatric: No anxiety or depression  Endocrine: No malaise, fatigue or temperature intolerance  Hematologic/Lymphatic: No bleeding problems, blood clots or swollen lymph nodes  Allergic/Immunologic: No nasal congestion or hives  All systems negative except as marked. Physical Examination:    Vitals:    02/28/23 0400 02/28/23 0500 02/28/23 0600 02/28/23 0900   BP: 138/62 (!) 130/58 137/69 120/76   Pulse: 88 63 60 83   Resp: 22 21 24 17   Temp: 97.4 °F (36.3 °C)   98 °F (36.7 °C)   TempSrc: Oral   Oral   SpO2: 98%  98%    Weight:  256 lb (116.1 kg)     Height:           General Appearance:  No distress, conversant    Constitutional:  Well developed, Well nourished, No acute distress, Non-toxic appearance.    HENT: Normocephalic, Atraumatic, Bilateral external ears normal, Oropharynx moist, No oral exudates, Nose normal. Neck- Normal range of motion, No tenderness, Supple, No stridor,no apical-carotid delay  Lymphatics : no palpable lymph nodes  Eyes:  PERRL, EOMI, Conjunctiva normal, No discharge.   Respiratory:  Normal breath sounds, No respiratory distress, No wheezing, No chest tenderness.,no use of accessory muscles, crackles Absent   Cardiovascular: (PMI) apex non displaced,no lifts no thrills, ankle swelling Absent  , 1+, s1 and s2 audible,Murmur.Absent , JVD not noted    Abdomen /GI:  Bowel sounds normal, Soft, No tenderness, No masses, No gross visceromegaly   :  No costovertebral angle tenderness   Musculoskeletal:  No edema, no tenderness, no deformities. Back- no tenderness  Integument:  Well hydrated, no rash   Lymphatic:  No lymphadenopathy noted   Neurologic:  Alert & oriented x 3, CN 2-12 normal, normal motor function, normal sensory function, no focal deficits noted           Medical decision making and Data review:    Lab Review   Recent Labs     02/27/23  0756   WBC 8.2   HGB 10.3*   HCT 34.4*         Recent Labs     02/27/23  0756      K 4.2      CO2 29   BUN 43*   CREATININE 2.0*     Recent Labs     02/27/23  0756   AST 19   ALT 8*   BILITOT 0.2   ALKPHOS 57     No results for input(s): TROPONINT in the last 72 hours.    Recent Labs     02/26/23  0030   PROBNP 14,667*     Lab Results   Component Value Date    INR 1.86 02/28/2023    PROTIME 24.1 (H) 02/28/2023       EKG: (reviewed by myself)    ECHO:(reviewed by myself)    Chest Xray:(reviewed by myself)  XR CHEST (2 VW)    Result Date: 1/31/2023  EXAMINATION: TWO XRAY VIEWS OF THE CHEST 1/31/2023 10:43 am COMPARISON: 09/07/2022 HISTORY: ORDERING SYSTEM PROVIDED HISTORY: SOB (shortness of breath) TECHNOLOGIST PROVIDED HISTORY: Reason for Exam: patient reports SOB on exertion FINDINGS: Cardiac silhouette is within normal limits.  Pulmonary vasculature is within normal limits. The lungs are clear. No pneumothorax is identified. Bony structures are unremarkable. No active cardiopulmonary process. CT HEAD WO CONTRAST    Result Date: 2/24/2023  EXAMINATION: CT OF THE HEAD WITHOUT CONTRAST  2/24/2023 12:22 pm TECHNIQUE: CT of the head was performed without the administration of intravenous contrast. Automated exposure control, iterative reconstruction, and/or weight based adjustment of the mA/kV was utilized to reduce the radiation dose to as low as reasonably achievable. COMPARISON: None. HISTORY: ORDERING SYSTEM PROVIDED HISTORY: Confusion FINDINGS: BRAIN/VENTRICLES:  No evidence of an acute infarct or other acute parenchymal process. No evidence of acute intracranial hemorrhage. There is no evidence of an intracranial mass or extraaxial fluid collection. No significant mass effect or midline shift. There is mild generalized volume loss. Ventricular enlargement concordant with the degree of parenchymal volume loss. Scattered patchy foci of low attenuation are present within supratentorial white matter which is a nonspecific finding but likely represents mild chronic microvascular ischemia. Remote lacunar infarcts throughout the bilateral basal ganglia. ORBITS: The visualized portion of the orbits demonstrate no acute abnormality. Right scleral banding. Left lens extraction. SINUSES:  The visualized paranasal sinuses and mastoid air cells demonstrate no acute abnormality. SOFT TISSUES/SKULL: No acute abnormality of the visualized skull or soft tissues. No acute intracranial abnormality. XR CHEST PORTABLE    Result Date: 2/27/2023  EXAMINATION: ONE XRAY VIEW OF THE CHEST 2/24/2023 12:08 pm COMPARISON: 1/31/2023, 9/7/2022 HISTORY: ORDERING SYSTEM PROVIDED HISTORY: Confusion TECHNOLOGIST PROVIDED HISTORY: Reason for exam:->Confusion Reason for Exam: Confusion FINDINGS: The cardiomediastinal silhouette is unchanged.   Bibasilar streaky opacities persist, favoring scarring or atelectasis. No new focal consolidation, pneumothorax, or pleural effusion. Osseous structures are diffusely demineralized and grossly unchanged. No acute process. US RETROPERITONEAL LIMITED    Result Date: 2/25/2023  EXAMINATION: ULTRASOUND OF THE KIDNEYS 2/25/2023 11:20 am COMPARISON: None. HISTORY: ORDERING SYSTEM PROVIDED HISTORY: JESSICA on CKDIII TECHNOLOGIST PROVIDED HISTORY: Reason for exam:->JESSICA on CKDIII FINDINGS: The right kidney measures 9.6 cm in length and the left kidney measures 9.6 cm in length. Kidneys demonstrate normal cortical echogenicity. No hydronephrosis or intrarenal stones. No focal lesions. No hydronephrosis. All labs, medications and tests reviewed by myself including data  from outside source , patient and available family . Continue all other medications of all above medical condition listed as is. Impression:  Principal Problem:    Severe sepsis ([address]  Active Problems:    Delirium    Bacterial infection due to Morganella morganii    Acute pyelonephritis  Resolved Problems:    * No resolved hospital problems. *      Assessment: 80 y. o.year old with PMH of  has a past medical history of Chronic kidney disease, Heart abnormality, Hypertension, and UTI (urinary tract infection). Plan and Recommendations:    Severe ischemic cardiomyopathy with h/o afib currently appears euvolemic admitted for bacteremia on antibiotics is legally blind  Given cardiomyopathy ejection fraction of 20% on guideline recommended medical therapy we will plan outpatient follow-up for ICD  HTN: stable, continue present medications   Atrial Fibrillation: on coumadin   Bacteremia treatment as per primary team  DVT prophylaxis if no contraindication  6. Dyslipidemia: continue statins           Thank you  much for consult and giving us the opportunity in contributing in the care of this patient. Please feel free to call me for any questions. Denzel Adams MD, 2/28/2023 3:09 PM

## 2023-02-28 NOTE — CONSULTS
[facility]at [facility], [address]   Consult Note  [facility] 1 2 3 4 5    Date: 2023   Patient: Gifty Olivo   : 1935   DOA: 2023   MRN: [id number]   ROOM#: -[id number]     Reason for Consult: UTI  Requesting Physician: Dr. Julianne Pintoigham   Collaborating Urologist on Call at time of admission: Dr. Marcey Lundborg: AMS and nausea/vomiting    History Obtained From:  patient, electronic medical record    HISTORY OF PRESENT ILLNESS:                The patient is a 80 y.o. male with significant past medical history of PAF, CKDIIIa, HTN, BPH, and UTI's who presented with AMS and nausea/vomiting 23. Work-up in the ED revealed sepsis secondary to a UTI, for which he was admitted and placed on Rocephin. His urine/blood cultures grew Morganella morganii and he was switched to IV Zosyn and ID consulted. Today, pt reports feeling much improved and denies any difficulty voiding. Of note, pt recently had a diagnostic cystoscopy with Dr. Angelito Nielsen 23. ED Provider's HPI : 15-year-old gentleman with a past medical history of HTN, paroxysmal A-fib, chronic systolic CHF, stage IIIa CKD, and legally blind who presents to the ED from home with complaints of intractable vomiting as well as alteration in mentation. Family he recently had a cystoscopy and has since then developed urinary frequency and urgency. Presentation he is hemodynamically stable but tachycardic to 99 and requiring 3 L of O2 via nasal cannula up CHEM panel is notable for creatinine of 2.5 (, baseline is around 2.1-2.2), initial troponin 0.017, EKG is unremarkable for ischemic events, bilirubin elevated to 1.3. His CBC is significant for white blood cell count elevated at 24.3, H&H 11.4/37.1 respectively. UA is positive for large amount of leukocyte Estrace, some hematuria with pyuria and bacteriuria.   Chest x-ray is negative for any acute process and CT head without contrast is negative for an acute intracranial abnormality. Been admitted for severe sepsis secondary to acute cystitis with AMS. Past Medical History:        Diagnosis Date    Chronic kidney disease     Heart abnormality     Hypertension     UTI (urinary tract infection)      Past Surgical History:    History reviewed. No pertinent surgical history. Current Medications:   Current Facility-Administered Medications: [START ON 3/1/2023] warfarin (COUMADIN) tablet 5 mg, 5 mg, Oral, Daily  warfarin (COUMADIN) tablet 6 mg, 6 mg, Oral, Once  sulfamethoxazole-trimethoprim (BACTRIM DS;SEPTRA DS) 800-160 MG per tablet 1 tablet, 1 tablet, Oral, 2 times per day  pantoprazole (PROTONIX) tablet 40 mg, 40 mg, Oral, QAM AC  calcium carbonate (TUMS) chewable tablet 500 mg, 500 mg, Oral, TID PRN  famotidine (PEPCID) tablet 20 mg, 20 mg, Oral, Nightly  melatonin tablet 6 mg, 6 mg, Oral, Nightly PRN  guaiFENesin (ROBITUSSIN) 100 MG/5ML liquid 200 mg, 200 mg, Oral, Q4H PRN  sodium chloride flush 0.9 % injection 5-40 mL, 5-40 mL, IntraVENous, 2 times per day  0.9 % sodium chloride infusion, , IntraVENous, PRN  acetaminophen (TYLENOL) tablet 650 mg, 650 mg, Oral, Q6H PRN **OR** acetaminophen (TYLENOL) suppository 650 mg, 650 mg, Rectal, Q6H PRN  doxazosin (CARDURA) tablet 1 mg, 1 mg, Oral, Nightly  [Held by provider] sacubitril-valsartan (ENTRESTO) 49-51 MG per tablet 1 tablet, 1 tablet, Oral, BID  isosorbide dinitrate (ISORDIL) tablet 30 mg, 30 mg, Oral, Daily  [Held by provider] furosemide (LASIX) tablet 40 mg, 40 mg, Oral, Every Other Day  carvedilol (COREG) tablet 12.5 mg, 12.5 mg, Oral, BID WC    Allergies:  Patient has no known allergies. Social History:   TOBACCO:   reports that he has never smoked. He has never used smokeless tobacco.  ETOH:   reports no history of alcohol use. DRUGS:   reports no history of drug use.     Family History:       Problem Relation Age of Onset    Kidney Disease Mother     Hypertension Mother     Diabetes Mother     Diabetes Granddaughter Hypertension Daughter     Cancer Daughter        REVIEW OF SYSTEMS:     CONSTITUTIONAL:  positive for  fatigue  RESPIRATORY:  negative  CARDIOVASCULAR:  negative  GASTROINTESTINAL:  negative  GENITOURINARY:  negative    PHYSICAL EXAM:      VITALS:  /69   Pulse 60   Temp 97.4 °F (36.3 °C) (Oral)   Resp 24   Ht 6' 4\" (1.93 m)   Wt 256 lb (116.1 kg)   SpO2 98%   BMI 31.16 kg/m²      TEMPERATURE:  Current - Temp: 97.4 °F (36.3 °C); Max - Temp  Av.6 °F (36.4 °C)  Min: 97.4 °F (36.3 °C)  Max: 97.7 °F (36.5 °C)  24HR BLOOD PRESSURE RANGE:  Systolic (81WFM), KFK:804 , Min:130 , QJR:006   ; Diastolic (38MLG), TGE:59, Min:47, Max:90    Physical Exam:  General appearance: alert, appears stated age, cooperative, no distress, and mildly obese  Head: Normocephalic, without obvious abnormality, atraumatic  Back:  No CVA tenderness  Abdomen:  Soft, non-tender, non-distended  : Uncircumcised phallus with no lesions/discharge.     DATA:    WBC:    Lab Results   Component Value Date/Time    WBC 8.2 2023 07:56 AM     Hemoglobin/Hematocrit:    Lab Results   Component Value Date/Time    HGB 10.3 2023 07:56 AM    HCT 34.4 2023 07:56 AM     BMP:    Lab Results   Component Value Date/Time     2023 07:56 AM    K 4.2 2023 07:56 AM     2023 07:56 AM    CO2 29 2023 07:56 AM    BUN 43 2023 07:56 AM    LABALBU 3.2 2023 07:56 AM    CREATININE 2.0 2023 07:56 AM    CALCIUM 7.4 2023 07:56 AM    GFRAA 35 2022 11:24 AM    LABGLOM 32 2023 07:56 AM     PT/INR:    Lab Results   Component Value Date/Time    PROTIME 24.1 2023 07:06 AM    PROTIME 1.9 2022 09:59 AM    INR 1.86 2023 07:06 AM       Blood Culture:  bottles +Morganella morganii    Urine Culture: Susceptibility  Morganella morganii (2)    Antibiotic Interpretation Microscan Method Status    ampicillin Resistant >16 BACTERIAL SUSCEPTIBILITY PANEL ANA Final ampicillin-sulbactam Resistant >16/8 BACTERIAL SUSCEPTIBILITY PANEL ANA Final    ceFAZolin Resistant >16 BACTERIAL SUSCEPTIBILITY PANEL ANA Final    cefepime Sensitive <=2 BACTERIAL SUSCEPTIBILITY PANEL ANA Final    cefuroxime Resistant >16 BACTERIAL SUSCEPTIBILITY PANEL ANA Final    ciprofloxacin Sensitive <=1 BACTERIAL SUSCEPTIBILITY PANEL ANA Final    ertapenem Sensitive <=0.5 BACTERIAL SUSCEPTIBILITY PANEL ANA Final    gentamicin Sensitive <=4 BACTERIAL SUSCEPTIBILITY PANEL ANA Final    meropenem Sensitive <=1 BACTERIAL SUSCEPTIBILITY PANEL ANA Final    piperacillin-tazobactam Sensitive <=16 BACTERIAL SUSCEPTIBILITY PANEL ANA Final    trimethoprim-sulfamethoxazole Sensitive <=2/38 BACTERIAL SUSCEPTIBILITY PANEL ANA Final    amoxicillin-clavulanate (f) Resistant >16/8 BACTERIAL SUSCEPTIBILITY PANEL ANA Final    nitrofurantoin Resistant 64 BACTERIAL SUSCEPTIBILITY PANEL ANA Final        Imaging:  XR CHEST (2 VW)    Result Date: 1/31/2023  EXAMINATION: TWO XRAY VIEWS OF THE CHEST 1/31/2023 10:43 am COMPARISON: 09/07/2022 HISTORY: ORDERING SYSTEM PROVIDED HISTORY: SOB (shortness of breath) TECHNOLOGIST PROVIDED HISTORY: Reason for Exam: patient reports SOB on exertion FINDINGS: Cardiac silhouette is within normal limits. Pulmonary vasculature is within normal limits. The lungs are clear. No pneumothorax is identified. Bony structures are unremarkable. No active cardiopulmonary process. CT HEAD WO CONTRAST    Result Date: 2/24/2023  EXAMINATION: CT OF THE HEAD WITHOUT CONTRAST  2/24/2023 12:22 pm TECHNIQUE: CT of the head was performed without the administration of intravenous contrast. Automated exposure control, iterative reconstruction, and/or weight based adjustment of the mA/kV was utilized to reduce the radiation dose to as low as reasonably achievable. COMPARISON: None.  HISTORY: ORDERING SYSTEM PROVIDED HISTORY: Confusion FINDINGS: BRAIN/VENTRICLES:  No evidence of an acute infarct or other acute parenchymal process.  No evidence of acute intracranial hemorrhage.  There is no evidence of an intracranial mass or extraaxial fluid collection. No significant mass effect or midline shift. There is mild generalized volume loss. Ventricular enlargement concordant with the degree of parenchymal volume loss. Scattered patchy foci of low attenuation are present within supratentorial white matter which is a nonspecific finding but likely represents mild chronic microvascular ischemia.  Remote lacunar infarcts throughout the bilateral basal ganglia. ORBITS: The visualized portion of the orbits demonstrate no acute abnormality.  Right scleral banding.  Left lens extraction. SINUSES:  The visualized paranasal sinuses and mastoid air cells demonstrate no acute abnormality. SOFT TISSUES/SKULL: No acute abnormality of the visualized skull or soft tissues.     No acute intracranial abnormality.     XR CHEST PORTABLE    Result Date: 2/27/2023  EXAMINATION: ONE XRAY VIEW OF THE CHEST 2/24/2023 12:08 pm COMPARISON: 1/31/2023, 9/7/2022 HISTORY: ORDERING SYSTEM PROVIDED HISTORY: Confusion TECHNOLOGIST PROVIDED HISTORY: Reason for exam:->Confusion Reason for Exam: Confusion FINDINGS: The cardiomediastinal silhouette is unchanged.  Bibasilar streaky opacities persist, favoring scarring or atelectasis.  No new focal consolidation, pneumothorax, or pleural effusion.  Osseous structures are diffusely demineralized and grossly unchanged.     No acute process.     US RETROPERITONEAL LIMITED    Result Date: 2/25/2023  EXAMINATION: ULTRASOUND OF THE KIDNEYS 2/25/2023 11:20 am COMPARISON: None. HISTORY: ORDERING SYSTEM PROVIDED HISTORY: JESSICA on CKDIII TECHNOLOGIST PROVIDED HISTORY: Reason for exam:->JESSICA on CKDIII FINDINGS: The right kidney measures 9.6 cm in length and the left kidney measures 9.6 cm in length. Kidneys demonstrate normal cortical echogenicity.  No hydronephrosis or intrarenal stones.  No focal lesions.     No hydronephrosis.  Assessment & Plan:      Gifty Olivo is a 80y.o. year old male admitted 2/24/2023 for sepsis, UTI. Pt known to Dr. Angelito Nielsen. 1) Urosepsis w Complicated UTI: 1/4 blood cultures +Morganella morganii   Urine cx +Morganella morganii, sens to Bactrim   Urine cx 2/22/23 in our office +Morganella morganii   WBC 8.2, afebrile   Finished Ceftriaxone 2/24-2/26 and Zosyn 2/26-2/28. Now on po Bactrim DS BID x14 days; ID following. 2) BPH: S/p diagnostic cystoscopy 2/22/23   On Terazosin and Proscar   Obtain a PVR bladder scan to ensure proper bladder emptying. Follow up in 2wks for reevaluation. Pt stable from a  standpoint. Will sign off, please call with any questions. Pt to follow up in our office with Dr. Angelito Nielsen in 2 weeks for reevaluation and urine check. Patient seen and examined, chart reviewed.      Electronically signed by Lori Gan PA-C on 2/28/2023 at 1:03 PM

## 2023-03-01 VITALS
DIASTOLIC BLOOD PRESSURE: 70 MMHG | WEIGHT: 255.9 LBS | HEIGHT: 76 IN | RESPIRATION RATE: 20 BRPM | HEART RATE: 83 BPM | TEMPERATURE: 98 F | BODY MASS INDEX: 31.16 KG/M2 | SYSTOLIC BLOOD PRESSURE: 136 MMHG | OXYGEN SATURATION: 98 %

## 2023-03-01 PROBLEM — A41.9 SEVERE SEPSIS (HCC): Status: RESOLVED | Noted: 2023-02-24 | Resolved: 2023-03-01

## 2023-03-01 PROBLEM — R41.0 DELIRIUM: Status: RESOLVED | Noted: 2023-02-27 | Resolved: 2023-03-01

## 2023-03-01 PROBLEM — A49.8 BACTERIAL INFECTION DUE TO MORGANELLA MORGANII: Status: RESOLVED | Noted: 2023-02-27 | Resolved: 2023-03-01

## 2023-03-01 PROBLEM — N10 ACUTE PYELONEPHRITIS: Status: RESOLVED | Noted: 2023-02-27 | Resolved: 2023-03-01

## 2023-03-01 PROBLEM — R65.20 SEVERE SEPSIS (HCC): Status: RESOLVED | Noted: 2023-02-24 | Resolved: 2023-03-01

## 2023-03-01 LAB
CRP SERPL HS-MCNC: 23.3 MG/L
CULTURE: NORMAL
INR BLD: 2.13 INDEX
Lab: NORMAL
PROTHROMBIN TIME: 27.7 SECONDS (ref 11.7–14.5)
SPECIMEN: NORMAL

## 2023-03-01 PROCEDURE — 36415 COLL VENOUS BLD VENIPUNCTURE: CPT

## 2023-03-01 PROCEDURE — 6370000000 HC RX 637 (ALT 250 FOR IP): Performed by: INTERNAL MEDICINE

## 2023-03-01 PROCEDURE — 86140 C-REACTIVE PROTEIN: CPT

## 2023-03-01 PROCEDURE — 99231 SBSQ HOSP IP/OBS SF/LOW 25: CPT | Performed by: NURSE PRACTITIONER

## 2023-03-01 PROCEDURE — 6370000000 HC RX 637 (ALT 250 FOR IP): Performed by: NURSE PRACTITIONER

## 2023-03-01 PROCEDURE — 94761 N-INVAS EAR/PLS OXIMETRY MLT: CPT

## 2023-03-01 PROCEDURE — 2700000000 HC OXYGEN THERAPY PER DAY

## 2023-03-01 PROCEDURE — 6370000000 HC RX 637 (ALT 250 FOR IP): Performed by: STUDENT IN AN ORGANIZED HEALTH CARE EDUCATION/TRAINING PROGRAM

## 2023-03-01 PROCEDURE — 85610 PROTHROMBIN TIME: CPT

## 2023-03-01 PROCEDURE — 2580000003 HC RX 258: Performed by: STUDENT IN AN ORGANIZED HEALTH CARE EDUCATION/TRAINING PROGRAM

## 2023-03-01 RX ORDER — WARFARIN SODIUM 2.5 MG/1
2.5 TABLET ORAL DAILY
Status: DISCONTINUED | OUTPATIENT
Start: 2023-03-01 | End: 2023-03-01 | Stop reason: HOSPADM

## 2023-03-01 RX ORDER — SULFAMETHOXAZOLE AND TRIMETHOPRIM 800; 160 MG/1; MG/1
1 TABLET ORAL EVERY 12 HOURS SCHEDULED
Qty: 22 TABLET | Refills: 0 | Status: SHIPPED | OUTPATIENT
Start: 2023-03-01 | End: 2023-03-12

## 2023-03-01 RX ADMIN — CARVEDILOL 12.5 MG: 6.25 TABLET, FILM COATED ORAL at 09:13

## 2023-03-01 RX ADMIN — SODIUM CHLORIDE, PRESERVATIVE FREE 10 ML: 5 INJECTION INTRAVENOUS at 09:13

## 2023-03-01 RX ADMIN — PANTOPRAZOLE SODIUM 40 MG: 40 TABLET, DELAYED RELEASE ORAL at 06:58

## 2023-03-01 RX ADMIN — ISOSORBIDE DINITRATE 30 MG: 20 TABLET ORAL at 09:13

## 2023-03-01 RX ADMIN — SACUBITRIL AND VALSARTAN 1 TABLET: 24; 26 TABLET, FILM COATED ORAL at 10:47

## 2023-03-01 RX ADMIN — SULFAMETHOXAZOLE AND TRIMETHOPRIM 1 TABLET: 800; 160 TABLET ORAL at 09:13

## 2023-03-01 NOTE — DISCHARGE SUMMARY
V2.0  Discharge Summary    Name:  Guero Clark /Age/Sex: 1935 (75 y.o. male)   Admit Date: 2023  Discharge Date: 3/1/23    MRN & CSN:  [id number] & [id number] Encounter Date and Time 3/1/23 11:51 AM EST    Attending:  Pat Vickers MD Discharging Provider: Pat Vickers MD       Hospital Course:     Brief HPI: Guero Clark is a 80 y.o. male with pmh of hypertension, paroxysmal atrial fibrillation, chronic HFrEF, chronic respiratory failure on 2-3 L of O2 on and off, CKD stage III a and legally blind who presents with Severe sepsis ([address]     Medically stable for DC to SNF. Plan:  Severe sepsis with acute encephalopathy in the setting of suspected UTI with intractable nausea and vomiting: UA positive for leukocyte esterase hematuria pyuria and bacteriuria. Blood cultures were drawn in which 1 out of 4 bottles are showing Enterobacter species. Urine and blood culture shows Morganella. Was on IV antibiotics Rocephin, changed to zosyn IV. Now DC Zosyn and start Bactrim per ID. Delirium precautions. Continue Bactrim with end of treatment date 3/12/2023. Stable for discharge  Runs of SVT vs Afib? Normal rate. Will monitor overnight. EF 20%. Cardio consulted. GDMT. CHRONIC HYPOXIC RESPIRATORY FAILURE with 2 to 3 L oxygen at home. JESSICA on CKD stage IIIA: Baseline creatinine of around 2.1. Most recent creatinine is 2.6. Avoid nephrotoxic agents continue to monitor. Nephrology on board  Elevated troponin likely demand ischemia in the setting of underlying CKD stage IIIa  Benign essential hypertension, home medication included Coreg 12.5 mg twice daily, Entresto Isordil 30 mg daily Cardura 1 mg at night and Lasix 40 mg every other day. Currently holding Entresto and Lasix due to JESSICA.   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 Most recent stress test showed a large anterior septal defect was on Entresto Coreg Imdur and Lasix. Continue to monitor. Paroxysmal atrial fibrillation on Coumadin  Legally blind continue fall precautions. The patient expressed appropriate understanding of, and agreement with the discharge recommendations, medications, and plan.      Consults this admission:  PHARMACY TO DOSE WARFARIN  IP CONSULT TO NEPHROLOGY  IP CONSULT TO GI  IP CONSULT TO INFECTIOUS DISEASES  IP CONSULT TO IV TEAM  IP CONSULT TO UROLOGY  IP CONSULT TO CARDIOLOGY    Discharge Diagnosis:   Severe sepsis [facility])      Discharge Instruction:   Follow up appointments: PCP, urology, ID, nephrology  Primary care physician: Aaron Nevarez MD within 2 weeks  Diet: renal diet   Activity: activity as tolerated  Disposition: Discharged to:   []Home, []C, [x]SNF, []Acute Rehab, []Hospice   Condition on discharge: Stable  Labs and Tests to be Followed up as an outpatient by PCP or Specialist: Needs to continue oral Bactrim antibiotic for 10 days to complete until 12/3/2023    Discharge Medications:        Medication List        START taking these medications      sacubitril-valsartan 49-51 MG per tablet  Commonly known as: ENTRESTO  Take 1 tablet by mouth 2 times daily     sulfamethoxazole-trimethoprim 800-160 MG per tablet  Commonly known as: BACTRIM DS;SEPTRA DS  Take 1 tablet by mouth every 12 hours for 11 days            CONTINUE taking these medications      amLODIPine 5 MG tablet  Commonly known as: NORVASC     carvedilol 12.5 MG tablet  Commonly known as: COREG  Take 1 tablet by mouth 2 times daily (with meals)     docusate sodium 100 MG capsule  Commonly known as: COLACE     famotidine 40 MG tablet  Commonly known as: PEPCID     finasteride 5 MG tablet  Commonly known as: PROSCAR     hydrALAZINE 25 MG tablet  Commonly known as: APRESOLINE  Take 1 tablet by mouth 3 times daily     isosorbide dinitrate 30 MG tablet  Commonly known as: ISORDIL     latanoprost 0.005 % ophthalmic solution  Commonly known as: XALATAN     ondansetron 4 MG tablet  Commonly known as: ZOFRAN     Oxygen Concentrator     PRESERVISION AREDS 2 PO     terazosin 5 MG capsule  Commonly known as: HYTRIN  Take 1 capsule by mouth nightly     warfarin 5 MG tablet  Commonly known as: Coumadin  Take 1 tablet by mouth daily            STOP taking these medications      furosemide 40 MG tablet  Commonly known as: Lasix     omega-3 fish oil 1000 MG Caps     potassium chloride 20 MEQ extended release tablet  Commonly known as: KLOR-CON M               Where to Get Your Medications        These medications were sent to [address] [place] [address]. - [phone] - F [phone]  [address] [place] [id number]      Phone: [phone]   sulfamethoxazole-trimethoprim 800-160 MG per tablet        Objective Findings at Discharge:   /70   Pulse 83   Temp 98 °F (36.7 °C) (Oral)   Resp 20   Ht 6' 4\" (1.93 m)   Wt 255 lb 14.4 oz (116.1 kg)   SpO2 98%   BMI 31.15 kg/m²       Physical Exam:   Physical Exam       Vitals reviewed. Constitutional:       Appearance: Normal appearance. He is normal weight. HENT:      Head: Normocephalic. Right Ear: Tympanic membrane normal.      Left Ear: Tympanic membrane normal.      Nose: Nose normal.      Mouth/Throat:      Mouth: Mucous membranes are moist.   Eyes:      Conjunctiva/sclera: Conjunctivae normal.      Pupils: Pupils are equal, round, and reactive to light. Cardiovascular:      Rate and Rhythm: Regular rhythm. Pulses: Normal pulses. Heart sounds: Normal heart sounds. No murmur heard. Pulmonary:      Effort: Pulmonary effort is normal.      Breath sounds: Normal breath sounds. No wheezing, rhonchi or rales. Abdominal:      General: Abdomen is flat. Bowel sounds are normal. There is no distension. Palpations: Abdomen is soft. Tenderness:  There is no abdominal tenderness. Musculoskeletal:        General: No deformity. Normal range of motion. Cervical back: Normal range of motion and neck supple. Skin:     General: Skin is dry. Coloration: Skin is not jaundiced or pale. Neurological:      General: No focal deficit present. Mental Status: He is alert and oriented to person, place, and time. Mental status is at baseline. Psychiatric:         Mood and Affect: Mood normal.         Behavior: Behavior normal.   Labs and Imaging   CT HEAD WO CONTRAST    Result Date: 2/24/2023  EXAMINATION: CT OF THE HEAD WITHOUT CONTRAST  2/24/2023 12:22 pm TECHNIQUE: CT of the head was performed without the administration of intravenous contrast. Automated exposure control, iterative reconstruction, and/or weight based adjustment of the mA/kV was utilized to reduce the radiation dose to as low as reasonably achievable. COMPARISON: None. HISTORY: ORDERING SYSTEM PROVIDED HISTORY: Confusion FINDINGS: BRAIN/VENTRICLES:  No evidence of an acute infarct or other acute parenchymal process. No evidence of acute intracranial hemorrhage. There is no evidence of an intracranial mass or extraaxial fluid collection. No significant mass effect or midline shift. There is mild generalized volume loss. Ventricular enlargement concordant with the degree of parenchymal volume loss. Scattered patchy foci of low attenuation are present within supratentorial white matter which is a nonspecific finding but likely represents mild chronic microvascular ischemia. Remote lacunar infarcts throughout the bilateral basal ganglia. ORBITS: The visualized portion of the orbits demonstrate no acute abnormality. Right scleral banding. Left lens extraction. SINUSES:  The visualized paranasal sinuses and mastoid air cells demonstrate no acute abnormality. SOFT TISSUES/SKULL: No acute abnormality of the visualized skull or soft tissues. No acute intracranial abnormality.      XR CHEST PORTABLE    Result Date: 2/27/2023  EXAMINATION: ONE XRAY VIEW OF THE CHEST 2/24/2023 12:08 pm COMPARISON: 1/31/2023, 9/7/2022 HISTORY: ORDERING SYSTEM PROVIDED HISTORY: Confusion TECHNOLOGIST PROVIDED HISTORY: Reason for exam:->Confusion Reason for Exam: Confusion FINDINGS: The cardiomediastinal silhouette is unchanged. Bibasilar streaky opacities persist, favoring scarring or atelectasis. No new focal consolidation, pneumothorax, or pleural effusion. Osseous structures are diffusely demineralized and grossly unchanged. No acute process. US RETROPERITONEAL LIMITED    Result Date: 2/25/2023  EXAMINATION: ULTRASOUND OF THE KIDNEYS 2/25/2023 11:20 am COMPARISON: None. HISTORY: ORDERING SYSTEM PROVIDED HISTORY: JESSICA on CKDIII TECHNOLOGIST PROVIDED HISTORY: Reason for exam:->JESSICA on CKDIII FINDINGS: The right kidney measures 9.6 cm in length and the left kidney measures 9.6 cm in length. Kidneys demonstrate normal cortical echogenicity. No hydronephrosis or intrarenal stones. No focal lesions. No hydronephrosis. CBC:   Recent Labs     02/27/23  0756   WBC 8.2   HGB 10.3*        BMP:    Recent Labs     02/27/23  0756      K 4.2      CO2 29   BUN 43*   CREATININE 2.0*   GLUCOSE 119*     Hepatic:   Recent Labs     02/27/23  0756   AST 19   ALT 8*   BILITOT 0.2   ALKPHOS 57     Lipids: No results found for: CHOL, HDL, TRIG  Hemoglobin A1C: No results found for: LABA1C  TSH: No results found for: TSH  Troponin:   Lab Results   Component Value Date/Time    TROPONINT 0.015 02/25/2023 05:39 AM    TROPONINT 0.017 02/24/2023 05:48 PM    TROPONINT 0.017 02/24/2023 12:16 PM     Lactic Acid: No results for input(s): LACTA in the last 72 hours. BNP: No results for input(s): PROBNP in the last 72 hours.   UA:  Lab Results   Component Value Date/Time    NITRU NEGATIVE 02/24/2023 01:00 PM    COLORU YELLOW 02/24/2023 01:00 PM    WBCUA 270 02/24/2023 01:00 PM    RBCUA 13 02/24/2023 01:00 PM MUCUS RARE 02/24/2023 01:00 PM    TRICHOMONAS NONE SEEN 02/24/2023 01:00 PM    BACTERIA MODERATE 02/24/2023 01:00 PM    CLARITYU SLIGHTLY CLOUDY 02/24/2023 01:00 PM    SPECGRAV <1.005 02/24/2023 01:00 PM    LEUKOCYTESUR LARGE NUMBER OR AMOUNT OBSERVED 02/24/2023 01:00 PM    UROBILINOGEN 1.0 02/24/2023 01:00 PM    BILIRUBINUR NEGATIVE 02/24/2023 01:00 PM    BLOODU SMALL NUMBER OR AMOUNT OBSERVED 02/24/2023 01:00 PM    [address] 02/24/2023 01:00 PM     Urine Cultures: No results found for: LABURIN  Blood Cultures: No results found for: BC  No results found for: BLOODCULT2  Organism: No results found for: ORG    Time Spent Discharging patient 35 minutes    Electronically signed by Mehrdad Castillo MD on 3/1/2023 at 11:51 AM

## 2023-03-01 NOTE — DISCHARGE INSTRUCTIONS
Continue po Bactrim 1 DS bid x cumulative 14 days (end date 3/12/2023), CrCl 36  Following, labs pending this am  When ready for DC:   Labs drawn on Monday during the course of treatment (3/6/2023)  CBC with differential, CMP, CRP  Fax results to Attn: [facility] Infectious Diseases Staff  # [phone]   ID will follow labs  Follow up with PCP/ urology after DC  OK from ID standpoint to DC when ready

## 2023-03-01 NOTE — PROGRESS NOTES
This nurse gave report to Reji wolf at [facility], told her pt is getting picked up at 1130 by [facility]

## 2023-03-01 NOTE — DISCHARGE INSTR - DIET

## 2023-03-01 NOTE — PROGRESS NOTES
Patient states that he is feeling much better today than he had been. He is jovial and does converse during discussion about ischemic cardiomyopathy. Patient Marvin Torres has been on hold due to hypotension. Plan:   Cardiomyopathy: appears euvolemic. EF 20%. Continue coreg. Will resume Entresto 24 to 26 mg twice daily and monitor. He is not on MRA or SLGT2 due to renal dysfunction. Would prefer him to be back on home dose but we will see what his blood pressure does. Will further discuss ICD as outpatient.

## 2023-03-01 NOTE — PROGRESS NOTES
Infectious Disease Progress Note  3/1/2023   Patient Name: Amara Poster : 1935   Impression  Morganella morganii Bacteremia Secondary to Complicated UTI:  Altered Mental Status: Resolved  Afebrile with no leukocytosis, patient has improved clinically  CrCl 36  Pct has trended down well, CRP 37  No allergies to ABX reported  -BC  Morganella morganii  : UA , RBC 13   Urine Culture:  Morganella morganii  Dr. Jose L Silveira, urology, onboard, rec follow up in office in 2 weeks for re-eval     JESSICA on CKD3:  Dr. India Ferrer onboard  Afib on coumadin/ HTN/ PAF/ Chronic HFrEF/ Cardiomyopathy:  Dr. Nima Farfan onboard  EF 20%, recommend per guidelines to follow up as OP for possible ICD placement  Vomiting: Resolved  Dr. Kathy Cuevas onboard  Rec PPI trial and EGD as OP  Legally Blind:  Multi-morbidity: per PMHx     Plan:  Continue po Bactrim 1 DS bid x cumulative 14 days (end date 3/12/2023), CrCl 36  Following, labs pending this am  Labs drawn on Monday during the course of treatment (3/6/2023)  CBC with differential, CMP, CRP  Fax results to Attn: [facility] Infectious Diseases Staff  # [phone]   ID will follow labs  Follow up with PCP/ urology after DC  OK from ID standpoint to DC when ready    Ongoing Antimicrobial Therapy  Bactrim -  Completed Antimicrobial Therapy  Ceftriaxone -?? Zosyn -? History:? Interval history noted. Chief complaint: Morganella morganii bacteremia from complicated UTI. Denies n/v/d/f or untoward effects of antibiotics  Physical Exam:  Vital Signs: /67   Pulse 71   Temp 97.8 °F (36.6 °C) (Oral)   Resp 19   Ht 6' 4\" (1.93 m)   Wt 255 lb 14.4 oz (116.1 kg)   SpO2 98%   BMI 31.15 kg/m²     Gen: remains A&O x 4, pleasant,  no distress  HEMT: AT/NC Oropharynx pink, moist, and without lesions or exudates; dentition in good state of repair, legally blind  Chest: no distress and CTA. Good air movement. Oxygen per NC (baseline)  Heart: Afib and no MRG. Abd: soft, non-distended, no tenderness, no hepatomegaly. Normoactive bowel sounds. Ext: no clubbing, cyanosis, or edema  Neuro: Mental status intact. CN 2-12 intact and no focal sensory or motor deficits     Radiologic / Imaging / TESTING  2/24/2023 XR Chest Portable:      Impression   No acute process. 2/24/2023 CT Head WO Contrast:  Impression   No acute intracranial abnormality. 2/25/2023 US Retroperitoneal Limited:  Impression   No hydronephrosis. Labs:    Recent Results (from the past 24 hour(s))   EKG 12 Lead    Collection Time: 02/28/23 12:34 PM   Result Value Ref Range    Ventricular Rate 94 BPM    Atrial Rate 94 BPM    P-R Interval 192 ms    QRS Duration 152 ms    Q-T Interval 424 ms    QTc Calculation (Bazett) 530 ms    P Axis 94 degrees    R Axis 21 degrees    T Axis 231 degrees    Diagnosis       Sinus rhythm with premature atrial complexes with aberrant conduction  Left bundle branch block  Abnormal ECG  When compared with ECG of 24-FEB-2023 13:01,  T wave inversion more evident in Inferior leads  T wave inversion no longer evident in Lateral leads  Confirmed by HANK Watt ([id number]) on 2/28/2023 6:20:18 PM       CULTURE results: Invalid input(s): BLOOD CULTURE,  URINE CULTURE, SURGICAL CULTURE    Diagnosis:  Patient Active Problem List   Diagnosis    BPH (benign prostatic hyperplasia)    Hypertension    LBBB (left bundle branch block)    SOB (shortness of breath)    Paroxysmal atrial fibrillation (HCC)    Acute on chronic diastolic congestive heart failure (HCC)    Stage 3a chronic kidney disease (HCC)    Legally blind    Precordial pain    CKD (chronic kidney disease) stage 4, 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  *  Resolved Problems:    Delirium    Bacterial infection due to Morganella morganii    Acute pyelonephritis    Electronically signed by: Electronically signed by Riki Portillo.  HILLARY Dinh CNP on 3/1/2023 at 9:15 AM

## 2023-03-01 NOTE — PROGRESS NOTES
PHARMACY ANTICOAGULATION MONITORING SERVICE    Shaye Moore is a 80 y.o. male on warfarin therapy for Afib. Pharmacy consulted by Dr. Yocasta Carmona for monitoring and adjustment of treatment. Indication for anticoagulation: AFib  INR goal: 2-3  Warfarin dose prior to admission: 5 mg daily    Pertinent Laboratory Values   Recent Labs     02/27/23  0756 02/28/23  0706 03/01/23  0940   INR 1.84   < > 2.13   HGB 10.3*  --   --    HCT 34.4*  --   --      --   --     < > = values in this interval not displayed. Assessment/Plan:  Drug Interactions: Bactrim started 2/28/23  INR will increase due to bactrim/warfarin interaction, will reduce dose to warfarin 2.5mg daily   Pharmacy will continue to monitor and adjust warfarin therapy as indicated    Thank you for the consult.   Sherrell Medina, [facility]  3/1/2023 11:50 AM

## 2023-03-01 NOTE — PLAN OF CARE
Problem: Discharge Planning  Goal: Discharge to home or other facility with appropriate resources  Outcome: Completed     Problem: Safety - Adult  Goal: Free from fall injury  Outcome: Completed     Problem: ABCDS Injury Assessment  Goal: Absence of physical injury  Outcome: Completed     Problem: Skin/Tissue Integrity  Goal: Absence of new skin breakdown  Description: 1. Monitor for areas of redness and/or skin breakdown  2. Assess vascular access sites hourly  3. Every 4-6 hours minimum:  Change oxygen saturation probe site  4. Every 4-6 hours:  If on nasal continuous positive airway pressure, respiratory therapy assess nares and determine need for appliance change or resting period.   Outcome: Completed     Problem: Chronic Conditions and Co-morbidities  Goal: Patient's chronic conditions and co-morbidity symptoms are monitored and maintained or improved  Outcome: Completed

## 2023-03-03 ENCOUNTER — HOSPITAL ENCOUNTER (OUTPATIENT)
Age: 88
Setting detail: SPECIMEN
Discharge: HOME | End: 2023-03-03

## 2023-03-07 ENCOUNTER — HOSPITAL ENCOUNTER (OUTPATIENT)
Age: 88
Setting detail: SPECIMEN
Discharge: HOME | End: 2023-03-07

## 2023-03-07 LAB
ALBUMIN SERPL-MCNC: 3.4 GM/DL (ref 3.4–5)
ALP BLD-CCNC: 65 IU/L (ref 40–128)
ALT SERPL-CCNC: 9 U/L (ref 10–40)
ANION GAP SERPL CALCULATED.3IONS-SCNC: 11 MMOL/L (ref 4–16)
AST SERPL-CCNC: 17 IU/L (ref 15–37)
BILIRUB SERPL-MCNC: 0.2 MG/DL (ref 0–1)
BUN SERPL-MCNC: 28 MG/DL (ref 6–23)
CALCIUM SERPL-MCNC: 7.3 MG/DL (ref 8.3–10.6)
CHLORIDE BLD-SCNC: 107 MMOL/L (ref 99–110)
CO2: 20 MMOL/L (ref 21–32)
CREAT SERPL-MCNC: 2.3 MG/DL (ref 0.9–1.3)
GFR SERPL CREATININE-BSD FRML MDRD: 27 ML/MIN/1.73M2
GLUCOSE SERPL-MCNC: 88 MG/DL (ref 70–99)
HCT VFR BLD CALC: 30.3 % (ref 42–52)
HEMOGLOBIN: 9.1 GM/DL (ref 13.5–18)
MCH RBC QN AUTO: 28.2 PG (ref 27–31)
MCHC RBC AUTO-ENTMCNC: 30 % (ref 32–36)
MCV RBC AUTO: 93.8 FL (ref 78–100)
PDW BLD-RTO: 15 % (ref 11.7–14.9)
PLATELET # BLD: 233 K/CU MM (ref 140–440)
PMV BLD AUTO: 10.1 FL (ref 7.5–11.1)
POTASSIUM SERPL-SCNC: 5.3 MMOL/L (ref 3.5–5.1)
RBC # BLD: 3.23 M/CU MM (ref 4.6–6.2)
SODIUM BLD-SCNC: 138 MMOL/L (ref 135–145)
TOTAL PROTEIN: 5.8 GM/DL (ref 6.4–8.2)
WBC # BLD: 9.7 K/CU MM (ref 4–10.5)

## 2023-03-07 PROCEDURE — 80053 COMPREHEN METABOLIC PANEL: CPT

## 2023-03-07 PROCEDURE — 86480 TB TEST CELL IMMUN MEASURE: CPT

## 2023-03-07 PROCEDURE — 85027 COMPLETE CBC AUTOMATED: CPT

## 2023-03-07 PROCEDURE — 36415 COLL VENOUS BLD VENIPUNCTURE: CPT

## 2023-03-09 LAB
QUANTI TB1 MINUS NIL: 0 IU/ML (ref 0–0.34)
QUANTI TB2 MINUS NIL: 0 IU/ML (ref 0–0.34)
QUANTIFERON (R) TB GOLD (INCUBATED): NEGATIVE IU/ML
QUANTIFERON MITOGEN MINUS NIL: >10 IU/ML
QUANTIFERON NIL: 0.03 IU/ML

## 2023-03-13 ENCOUNTER — HOSPITAL ENCOUNTER (OUTPATIENT)
Age: 88
Setting detail: SPECIMEN
Discharge: HOME | End: 2023-03-13

## 2023-03-13 PROCEDURE — 87077 CULTURE AEROBIC IDENTIFY: CPT

## 2023-03-13 PROCEDURE — 87186 SC STD MICRODIL/AGAR DIL: CPT

## 2023-03-13 PROCEDURE — 87086 URINE CULTURE/COLONY COUNT: CPT

## 2023-03-14 ENCOUNTER — HOSPITAL ENCOUNTER (OUTPATIENT)
Age: 88
Setting detail: SPECIMEN
Discharge: HOME | End: 2023-03-14

## 2023-03-14 LAB
ALBUMIN SERPL-MCNC: 3.4 GM/DL (ref 3.4–5)
ALBUMIN SERPL-MCNC: 3.5 GM/DL (ref 3.4–5)
ALP BLD-CCNC: 74 IU/L (ref 40–129)
ALP BLD-CCNC: 75 IU/L (ref 40–128)
ALT SERPL-CCNC: 7 U/L (ref 10–40)
ALT SERPL-CCNC: 7 U/L (ref 10–40)
ANION GAP SERPL CALCULATED.3IONS-SCNC: 6 MMOL/L (ref 4–16)
ANION GAP SERPL CALCULATED.3IONS-SCNC: 8 MMOL/L (ref 4–16)
AST SERPL-CCNC: 13 IU/L (ref 15–37)
AST SERPL-CCNC: 13 IU/L (ref 15–37)
BILIRUB SERPL-MCNC: 0.2 MG/DL (ref 0–1)
BILIRUB SERPL-MCNC: 0.2 MG/DL (ref 0–1)
BUN SERPL-MCNC: 41 MG/DL (ref 6–23)
BUN SERPL-MCNC: 41 MG/DL (ref 6–23)
CALCIUM SERPL-MCNC: 7.2 MG/DL (ref 8.3–10.6)
CALCIUM SERPL-MCNC: 7.4 MG/DL (ref 8.3–10.6)
CHLORIDE BLD-SCNC: 109 MMOL/L (ref 99–110)
CHLORIDE BLD-SCNC: 110 MMOL/L (ref 99–110)
CO2: 21 MMOL/L (ref 21–32)
CO2: 23 MMOL/L (ref 21–32)
CREAT SERPL-MCNC: 2.5 MG/DL (ref 0.9–1.3)
CREAT SERPL-MCNC: 2.6 MG/DL (ref 0.9–1.3)
GFR SERPL CREATININE-BSD FRML MDRD: 23 ML/MIN/1.73M2
GFR SERPL CREATININE-BSD FRML MDRD: 24 ML/MIN/1.73M2
GLUCOSE SERPL-MCNC: 123 MG/DL (ref 70–99)
GLUCOSE SERPL-MCNC: 83 MG/DL (ref 70–99)
HCT VFR BLD CALC: 29.1 % (ref 42–52)
HCT VFR BLD CALC: 29.4 % (ref 42–52)
HEMOGLOBIN: 8.5 GM/DL (ref 13.5–18)
HEMOGLOBIN: 8.5 GM/DL (ref 13.5–18)
MCH RBC QN AUTO: 27.7 PG (ref 27–31)
MCH RBC QN AUTO: 28.2 PG (ref 27–31)
MCHC RBC AUTO-ENTMCNC: 28.9 % (ref 32–36)
MCHC RBC AUTO-ENTMCNC: 29.2 % (ref 32–36)
MCV RBC AUTO: 95.8 FL (ref 78–100)
MCV RBC AUTO: 96.7 FL (ref 78–100)
PDW BLD-RTO: 14.8 % (ref 11.7–14.9)
PDW BLD-RTO: 14.9 % (ref 11.7–14.9)
PLATELET # BLD: 200 K/CU MM (ref 140–440)
PLATELET # BLD: 201 K/CU MM (ref 140–440)
PMV BLD AUTO: 9.4 FL (ref 7.5–11.1)
PMV BLD AUTO: 9.8 FL (ref 7.5–11.1)
POTASSIUM SERPL-SCNC: 6.8 MMOL/L (ref 3.5–5.1)
POTASSIUM SERPL-SCNC: 7.2 MMOL/L (ref 3.5–5.1)
RBC # BLD: 3.01 M/CU MM (ref 4.6–6.2)
RBC # BLD: 3.07 M/CU MM (ref 4.6–6.2)
SODIUM BLD-SCNC: 138 MMOL/L (ref 135–145)
SODIUM BLD-SCNC: 139 MMOL/L (ref 135–145)
TOTAL PROTEIN: 5.9 GM/DL (ref 6.4–8.2)
TOTAL PROTEIN: 6 GM/DL (ref 6.4–8.2)
WBC # BLD: 8.1 K/CU MM (ref 4–10.5)
WBC # BLD: 8.8 K/CU MM (ref 4–10.5)

## 2023-03-14 PROCEDURE — 9900360100 HC STAT COLLECTION FEE SNF

## 2023-03-14 PROCEDURE — 85027 COMPLETE CBC AUTOMATED: CPT

## 2023-03-14 PROCEDURE — 36415 COLL VENOUS BLD VENIPUNCTURE: CPT

## 2023-03-14 PROCEDURE — 80053 COMPREHEN METABOLIC PANEL: CPT

## 2023-03-16 ENCOUNTER — HOSPITAL ENCOUNTER (OUTPATIENT)
Age: 88
Setting detail: SPECIMEN
Discharge: HOME | End: 2023-03-16

## 2023-03-16 ENCOUNTER — HOSPITAL ENCOUNTER (INPATIENT)
Age: 88
LOS: 3 days | Discharge: OTHER INSTITUTION | DRG: 690 | End: 2023-03-20
Attending: EMERGENCY MEDICINE | Admitting: SURGERY
Payer: MEDICARE

## 2023-03-16 DIAGNOSIS — N39.0 URINARY TRACT INFECTION WITHOUT HEMATURIA, SITE UNSPECIFIED: Primary | ICD-10-CM

## 2023-03-16 DIAGNOSIS — E87.5 HYPERKALEMIA: ICD-10-CM

## 2023-03-16 LAB
ALBUMIN SERPL-MCNC: 3.5 GM/DL (ref 3.4–5)
ALP BLD-CCNC: 78 IU/L (ref 40–129)
ALT SERPL-CCNC: 7 U/L (ref 10–40)
ANION GAP SERPL CALCULATED.3IONS-SCNC: 8 MMOL/L (ref 4–16)
AST SERPL-CCNC: 11 IU/L (ref 15–37)
BILIRUB SERPL-MCNC: 0.3 MG/DL (ref 0–1)
BUN SERPL-MCNC: 46 MG/DL (ref 6–23)
CALCIUM SERPL-MCNC: 7.3 MG/DL (ref 8.3–10.6)
CHLORIDE BLD-SCNC: 114 MMOL/L (ref 99–110)
CO2: 22 MMOL/L (ref 21–32)
CREAT SERPL-MCNC: 2.2 MG/DL (ref 0.9–1.3)
CULTURE: ABNORMAL
GFR SERPL CREATININE-BSD FRML MDRD: 28 ML/MIN/1.73M2
GLUCOSE SERPL-MCNC: 94 MG/DL (ref 70–99)
Lab: ABNORMAL
POTASSIUM SERPL-SCNC: 6 MMOL/L (ref 3.5–5.1)
SODIUM BLD-SCNC: 144 MMOL/L (ref 135–145)
SPECIMEN: ABNORMAL
TOTAL PROTEIN: 5.9 GM/DL (ref 6.4–8.2)

## 2023-03-16 PROCEDURE — 80053 COMPREHEN METABOLIC PANEL: CPT

## 2023-03-16 PROCEDURE — 99285 EMERGENCY DEPT VISIT HI MDM: CPT

## 2023-03-16 PROCEDURE — 86140 C-REACTIVE PROTEIN: CPT

## 2023-03-16 PROCEDURE — 96375 TX/PRO/DX INJ NEW DRUG ADDON: CPT

## 2023-03-16 PROCEDURE — 36415 COLL VENOUS BLD VENIPUNCTURE: CPT

## 2023-03-16 PROCEDURE — 93005 ELECTROCARDIOGRAM TRACING: CPT | Performed by: EMERGENCY MEDICINE

## 2023-03-16 PROCEDURE — 84145 PROCALCITONIN (PCT): CPT

## 2023-03-16 PROCEDURE — 85025 COMPLETE CBC W/AUTO DIFF WBC: CPT

## 2023-03-16 PROCEDURE — 96374 THER/PROPH/DIAG INJ IV PUSH: CPT

## 2023-03-16 PROCEDURE — 81003 URINALYSIS AUTO W/O SCOPE: CPT

## 2023-03-16 PROCEDURE — 87040 BLOOD CULTURE FOR BACTERIA: CPT

## 2023-03-16 PROCEDURE — 87086 URINE CULTURE/COLONY COUNT: CPT

## 2023-03-16 RX ORDER — CALCIUM GLUCONATE 20 MG/ML
1000 INJECTION, SOLUTION INTRAVENOUS ONCE
Status: COMPLETED | OUTPATIENT
Start: 2023-03-16 | End: 2023-03-17

## 2023-03-16 ASSESSMENT — PAIN - FUNCTIONAL ASSESSMENT: PAIN_FUNCTIONAL_ASSESSMENT: NONE - DENIES PAIN

## 2023-03-17 PROBLEM — N39.0 UTI (URINARY TRACT INFECTION): Status: ACTIVE | Noted: 2023-03-17

## 2023-03-17 LAB
ALBUMIN SERPL-MCNC: 3.3 GM/DL (ref 3.4–5)
ALBUMIN SERPL-MCNC: 3.5 GM/DL (ref 3.4–5)
ALP BLD-CCNC: 77 IU/L (ref 40–128)
ALP BLD-CCNC: 83 IU/L (ref 40–129)
ALT SERPL-CCNC: 7 U/L (ref 10–40)
ALT SERPL-CCNC: 7 U/L (ref 10–40)
ANION GAP SERPL CALCULATED.3IONS-SCNC: 5 MMOL/L (ref 4–16)
ANION GAP SERPL CALCULATED.3IONS-SCNC: 6 MMOL/L (ref 4–16)
AST SERPL-CCNC: 11 IU/L (ref 15–37)
AST SERPL-CCNC: 14 IU/L (ref 15–37)
BACTERIA: NEGATIVE /HPF
BACTERIA: NEGATIVE /HPF
BASOPHILS ABSOLUTE: 0.1 K/CU MM
BASOPHILS RELATIVE PERCENT: 0.7 % (ref 0–1)
BILIRUB SERPL-MCNC: 0.2 MG/DL (ref 0–1)
BILIRUB SERPL-MCNC: 0.2 MG/DL (ref 0–1)
BILIRUBIN URINE: NEGATIVE
BILIRUBIN URINE: NEGATIVE MG/DL
BLOOD, URINE: ABNORMAL
BLOOD, URINE: NORMAL
BUN SERPL-MCNC: 29 MG/DL (ref 6–23)
BUN SERPL-MCNC: 41 MG/DL (ref 6–23)
CALCIUM SERPL-MCNC: 7.3 MG/DL (ref 8.3–10.6)
CALCIUM SERPL-MCNC: 7.5 MG/DL (ref 8.3–10.6)
CHLORIDE BLD-SCNC: 111 MMOL/L (ref 99–110)
CHLORIDE BLD-SCNC: 113 MMOL/L (ref 99–110)
CLARITY: ABNORMAL
CLARITY: NORMAL
CO2: 22 MMOL/L (ref 21–32)
CO2: 22 MMOL/L (ref 21–32)
COLOR: ABNORMAL
COLOR: YELLOW
CREAT SERPL-MCNC: 1.4 MG/DL (ref 0.9–1.3)
CREAT SERPL-MCNC: 1.9 MG/DL (ref 0.9–1.3)
CREATININE URINE: 58.9 MG/DL (ref 39–259)
CRP SERPL HS-MCNC: 8.5 MG/L
DIFFERENTIAL TYPE: ABNORMAL
EKG ATRIAL RATE: 94 BPM
EKG DIAGNOSIS: NORMAL
EKG P AXIS: 16 DEGREES
EKG P-R INTERVAL: 178 MS
EKG Q-T INTERVAL: 426 MS
EKG QRS DURATION: 148 MS
EKG QTC CALCULATION (BAZETT): 526 MS
EKG R AXIS: 20 DEGREES
EKG T AXIS: 203 DEGREES
EKG VENTRICULAR RATE: 92 BPM
EOSINOPHILS ABSOLUTE: 0.2 K/CU MM
EOSINOPHILS RELATIVE PERCENT: 3 % (ref 0–3)
GFR SERPL CREATININE-BSD FRML MDRD: 34 ML/MIN/1.73M2
GFR SERPL CREATININE-BSD FRML MDRD: 49 ML/MIN/1.73M2
GLUCOSE BLD-MCNC: 103 MG/DL (ref 70–99)
GLUCOSE BLD-MCNC: 121 MG/DL (ref 70–99)
GLUCOSE BLD-MCNC: 71 MG/DL (ref 70–99)
GLUCOSE SERPL-MCNC: 136 MG/DL (ref 70–99)
GLUCOSE SERPL-MCNC: 95 MG/DL (ref 70–99)
GLUCOSE, URINE: NEGATIVE MG/DL
GLUCOSE, URINE: NEGATIVE MG/DL
HCT VFR BLD CALC: 29.2 % (ref 42–52)
HEMOGLOBIN: 8.5 GM/DL (ref 13.5–18)
IMMATURE NEUTROPHIL %: 0.3 % (ref 0–0.43)
INR BLD: 3.89 INDEX
KETONES, URINE: ABNORMAL MG/DL
KETONES, URINE: NEGATIVE MG/DL
LACTATE: 0.5 MMOL/L (ref 0.5–1.9)
LACTATE: 0.8 MMOL/L (ref 0.5–1.9)
LACTATE: 0.9 MMOL/L (ref 0.5–1.9)
LACTATE: 0.9 MMOL/L (ref 0.5–1.9)
LACTATE: 1.3 MMOL/L (ref 0.5–1.9)
LEUKOCYTE ESTERASE, URINE: ABNORMAL
LEUKOCYTE ESTERASE, URINE: NORMAL
LYMPHOCYTES ABSOLUTE: 2 K/CU MM
LYMPHOCYTES RELATIVE PERCENT: 27.8 % (ref 24–44)
MCH RBC QN AUTO: 28.1 PG (ref 27–31)
MCHC RBC AUTO-ENTMCNC: 29.1 % (ref 32–36)
MCV RBC AUTO: 96.4 FL (ref 78–100)
MONOCYTES ABSOLUTE: 0.9 K/CU MM
MONOCYTES RELATIVE PERCENT: 12.3 % (ref 0–4)
NITRITE URINE, QUANTITATIVE: NEGATIVE
NITRITE URINE, QUANTITATIVE: POSITIVE
NUCLEATED RBC %: 0 %
PDW BLD-RTO: 15 % (ref 11.7–14.9)
PH, URINE: 6
PH, URINE: 6.5 (ref 5–8)
PLATELET # BLD: 196 K/CU MM (ref 140–440)
PMV BLD AUTO: 9.5 FL (ref 7.5–11.1)
POTASSIUM SERPL-SCNC: 5.6 MMOL/L (ref 3.5–5.1)
POTASSIUM SERPL-SCNC: 5.9 MMOL/L (ref 3.5–5.1)
POTASSIUM, UR: 19.1 MMOL/L (ref 22–119)
PROCALCITONIN SERPL-MCNC: 0.05 NG/ML
PROT/CREAT RATIO, UR: 4.9
PROTEIN UA: >300 MG/DL
PROTEIN UA: NORMAL MG/DL
PROTHROMBIN TIME: 50.9 SECONDS (ref 11.7–14.5)
RBC # BLD: 3.03 M/CU MM (ref 4.6–6.2)
RBC URINE: 130 /HPF
RBC URINE: ABNORMAL /HPF (ref 0–3)
SEGMENTED NEUTROPHILS ABSOLUTE COUNT: 3.9 K/CU MM
SEGMENTED NEUTROPHILS RELATIVE PERCENT: 55.9 % (ref 36–66)
SODIUM BLD-SCNC: 138 MMOL/L (ref 135–145)
SODIUM BLD-SCNC: 141 MMOL/L (ref 135–145)
SODIUM URINE: 130 MMOL/L (ref 35–167)
SPECIFIC GRAVITY UA: 1.01
SPECIFIC GRAVITY UA: 1.01 (ref 1–1.03)
TOTAL IMMATURE NEUTOROPHIL: 0.02 K/CU MM
TOTAL NUCLEATED RBC: 0 K/CU MM
TOTAL PROTEIN: 5.8 GM/DL (ref 6.4–8.2)
TOTAL PROTEIN: 6.5 GM/DL (ref 6.4–8.2)
TRICHOMONAS: ABNORMAL /HPF
TRICHOMONAS: NORMAL /HPF
URINE TOTAL PROTEIN: 291.5 MG/DL
UROBILINOGEN, URINE: 0.2 MG/DL
UROBILINOGEN, URINE: 2 MG/DL (ref 0.2–1)
WBC # BLD: 7.1 K/CU MM (ref 4–10.5)
WBC UA: 3 /HPF
WBC UA: ABNORMAL /HPF (ref 0–2)

## 2023-03-17 PROCEDURE — 83605 ASSAY OF LACTIC ACID: CPT

## 2023-03-17 PROCEDURE — 6360000002 HC RX W HCPCS: Performed by: STUDENT IN AN ORGANIZED HEALTH CARE EDUCATION/TRAINING PROGRAM

## 2023-03-17 PROCEDURE — 87040 BLOOD CULTURE FOR BACTERIA: CPT

## 2023-03-17 PROCEDURE — 97530 THERAPEUTIC ACTIVITIES: CPT

## 2023-03-17 PROCEDURE — 2500000003 HC RX 250 WO HCPCS: Performed by: EMERGENCY MEDICINE

## 2023-03-17 PROCEDURE — 94761 N-INVAS EAR/PLS OXIMETRY MLT: CPT

## 2023-03-17 PROCEDURE — 86900 BLOOD TYPING SEROLOGIC ABO: CPT

## 2023-03-17 PROCEDURE — 82962 GLUCOSE BLOOD TEST: CPT

## 2023-03-17 PROCEDURE — APPSS60 APP SPLIT SHARED TIME 46-60 MINUTES: Performed by: NURSE PRACTITIONER

## 2023-03-17 PROCEDURE — 86850 RBC ANTIBODY SCREEN: CPT

## 2023-03-17 PROCEDURE — 86901 BLOOD TYPING SEROLOGIC RH(D): CPT

## 2023-03-17 PROCEDURE — 6370000000 HC RX 637 (ALT 250 FOR IP): Performed by: EMERGENCY MEDICINE

## 2023-03-17 PROCEDURE — 51798 US URINE CAPACITY MEASURE: CPT

## 2023-03-17 PROCEDURE — 97535 SELF CARE MNGMENT TRAINING: CPT

## 2023-03-17 PROCEDURE — 85610 PROTHROMBIN TIME: CPT

## 2023-03-17 PROCEDURE — 2580000003 HC RX 258: Performed by: EMERGENCY MEDICINE

## 2023-03-17 PROCEDURE — 97166 OT EVAL MOD COMPLEX 45 MIN: CPT

## 2023-03-17 PROCEDURE — 6370000000 HC RX 637 (ALT 250 FOR IP): Performed by: INTERNAL MEDICINE

## 2023-03-17 PROCEDURE — 80053 COMPREHEN METABOLIC PANEL: CPT

## 2023-03-17 PROCEDURE — 6360000002 HC RX W HCPCS: Performed by: EMERGENCY MEDICINE

## 2023-03-17 PROCEDURE — 99222 1ST HOSP IP/OBS MODERATE 55: CPT | Performed by: INTERNAL MEDICINE

## 2023-03-17 PROCEDURE — 6370000000 HC RX 637 (ALT 250 FOR IP): Performed by: STUDENT IN AN ORGANIZED HEALTH CARE EDUCATION/TRAINING PROGRAM

## 2023-03-17 PROCEDURE — 1200000000 HC SEMI PRIVATE

## 2023-03-17 PROCEDURE — 2580000003 HC RX 258: Performed by: STUDENT IN AN ORGANIZED HEALTH CARE EDUCATION/TRAINING PROGRAM

## 2023-03-17 PROCEDURE — 82272 OCCULT BLD FECES 1-3 TESTS: CPT

## 2023-03-17 PROCEDURE — 36415 COLL VENOUS BLD VENIPUNCTURE: CPT

## 2023-03-17 PROCEDURE — 85027 COMPLETE CBC AUTOMATED: CPT

## 2023-03-17 PROCEDURE — 93010 ELECTROCARDIOGRAM REPORT: CPT | Performed by: INTERNAL MEDICINE

## 2023-03-17 PROCEDURE — 84300 ASSAY OF URINE SODIUM: CPT

## 2023-03-17 PROCEDURE — 85730 THROMBOPLASTIN TIME PARTIAL: CPT

## 2023-03-17 PROCEDURE — 82570 ASSAY OF URINE CREATININE: CPT

## 2023-03-17 PROCEDURE — 81001 URINALYSIS AUTO W/SCOPE: CPT

## 2023-03-17 PROCEDURE — 2700000000 HC OXYGEN THERAPY PER DAY

## 2023-03-17 PROCEDURE — 82270 OCCULT BLOOD FECES: CPT

## 2023-03-17 PROCEDURE — 84133 ASSAY OF URINE POTASSIUM: CPT

## 2023-03-17 PROCEDURE — 97162 PT EVAL MOD COMPLEX 30 MIN: CPT

## 2023-03-17 PROCEDURE — 84156 ASSAY OF PROTEIN URINE: CPT

## 2023-03-17 RX ORDER — ONDANSETRON 4 MG/1
4 TABLET, ORALLY DISINTEGRATING ORAL EVERY 8 HOURS PRN
Status: DISCONTINUED | OUTPATIENT
Start: 2023-03-17 | End: 2023-03-20 | Stop reason: HOSPADM

## 2023-03-17 RX ORDER — WARFARIN SODIUM 5 MG/1
5 TABLET ORAL DAILY
Status: DISCONTINUED | OUTPATIENT
Start: 2023-03-17 | End: 2023-03-17

## 2023-03-17 RX ORDER — ACETAMINOPHEN 650 MG/1
650 SUPPOSITORY RECTAL EVERY 6 HOURS PRN
Status: DISCONTINUED | OUTPATIENT
Start: 2023-03-17 | End: 2023-03-20 | Stop reason: HOSPADM

## 2023-03-17 RX ORDER — CARVEDILOL 6.25 MG/1
12.5 TABLET ORAL 2 TIMES DAILY WITH MEALS
Status: DISCONTINUED | OUTPATIENT
Start: 2023-03-17 | End: 2023-03-20 | Stop reason: HOSPADM

## 2023-03-17 RX ORDER — M-VIT,TX,IRON,MINS/CALC/FOLIC 27MG-0.4MG
1 TABLET ORAL DAILY
Status: DISCONTINUED | OUTPATIENT
Start: 2023-03-17 | End: 2023-03-20 | Stop reason: HOSPADM

## 2023-03-17 RX ORDER — TERAZOSIN 5 MG/1
5 CAPSULE ORAL NIGHTLY
Status: DISCONTINUED | OUTPATIENT
Start: 2023-03-17 | End: 2023-03-20 | Stop reason: HOSPADM

## 2023-03-17 RX ORDER — ONDANSETRON 2 MG/ML
4 INJECTION INTRAMUSCULAR; INTRAVENOUS EVERY 6 HOURS PRN
Status: DISCONTINUED | OUTPATIENT
Start: 2023-03-17 | End: 2023-03-20 | Stop reason: HOSPADM

## 2023-03-17 RX ORDER — FINASTERIDE 5 MG/1
5 TABLET, FILM COATED ORAL DAILY
Status: DISCONTINUED | OUTPATIENT
Start: 2023-03-17 | End: 2023-03-20 | Stop reason: HOSPADM

## 2023-03-17 RX ORDER — MAGNESIUM HYDROXIDE/ALUMINUM HYDROXICE/SIMETHICONE 120; 1200; 1200 MG/30ML; MG/30ML; MG/30ML
30 SUSPENSION ORAL EVERY 6 HOURS PRN
Status: DISCONTINUED | OUTPATIENT
Start: 2023-03-17 | End: 2023-03-20 | Stop reason: HOSPADM

## 2023-03-17 RX ORDER — ONDANSETRON 4 MG/1
4 TABLET, FILM COATED ORAL PRN
Status: DISCONTINUED | OUTPATIENT
Start: 2023-03-17 | End: 2023-03-17 | Stop reason: SDUPTHER

## 2023-03-17 RX ORDER — DIPHENHYDRAMINE HCL 25 MG
25 TABLET ORAL NIGHTLY
COMMUNITY

## 2023-03-17 RX ORDER — SODIUM CHLORIDE 0.9 % (FLUSH) 0.9 %
5-40 SYRINGE (ML) INJECTION PRN
Status: DISCONTINUED | OUTPATIENT
Start: 2023-03-17 | End: 2023-03-20 | Stop reason: HOSPADM

## 2023-03-17 RX ORDER — ACETAMINOPHEN 325 MG/1
650 TABLET ORAL EVERY 6 HOURS PRN
Status: DISCONTINUED | OUTPATIENT
Start: 2023-03-17 | End: 2023-03-20 | Stop reason: HOSPADM

## 2023-03-17 RX ORDER — HYDRALAZINE HYDROCHLORIDE 25 MG/1
25 TABLET, FILM COATED ORAL 3 TIMES DAILY
Status: DISCONTINUED | OUTPATIENT
Start: 2023-03-17 | End: 2023-03-20 | Stop reason: HOSPADM

## 2023-03-17 RX ORDER — FAMOTIDINE 20 MG/1
40 TABLET, FILM COATED ORAL DAILY
Status: DISCONTINUED | OUTPATIENT
Start: 2023-03-17 | End: 2023-03-20 | Stop reason: HOSPADM

## 2023-03-17 RX ORDER — SODIUM CHLORIDE 9 MG/ML
INJECTION, SOLUTION INTRAVENOUS CONTINUOUS
Status: DISCONTINUED | OUTPATIENT
Start: 2023-03-17 | End: 2023-03-17

## 2023-03-17 RX ORDER — DOCUSATE SODIUM 100 MG/1
100 CAPSULE, LIQUID FILLED ORAL DAILY
Status: DISCONTINUED | OUTPATIENT
Start: 2023-03-17 | End: 2023-03-20 | Stop reason: HOSPADM

## 2023-03-17 RX ORDER — POLYETHYLENE GLYCOL 3350 17 G/17G
17 POWDER, FOR SOLUTION ORAL DAILY PRN
Status: DISCONTINUED | OUTPATIENT
Start: 2023-03-17 | End: 2023-03-20 | Stop reason: HOSPADM

## 2023-03-17 RX ORDER — LATANOPROST 50 UG/ML
1 SOLUTION/ DROPS OPHTHALMIC NIGHTLY
Status: DISCONTINUED | OUTPATIENT
Start: 2023-03-17 | End: 2023-03-20 | Stop reason: HOSPADM

## 2023-03-17 RX ORDER — SODIUM CHLORIDE 0.9 % (FLUSH) 0.9 %
5-40 SYRINGE (ML) INJECTION EVERY 12 HOURS SCHEDULED
Status: DISCONTINUED | OUTPATIENT
Start: 2023-03-17 | End: 2023-03-20 | Stop reason: HOSPADM

## 2023-03-17 RX ORDER — ANTIOX #8/OM3/DHA/EPA/LUT/ZEAX 250-2.5 MG
1 CAPSULE ORAL DAILY
Status: DISCONTINUED | OUTPATIENT
Start: 2023-03-17 | End: 2023-03-17 | Stop reason: CLARIF

## 2023-03-17 RX ORDER — SODIUM CHLORIDE 9 MG/ML
INJECTION, SOLUTION INTRAVENOUS PRN
Status: DISCONTINUED | OUTPATIENT
Start: 2023-03-17 | End: 2023-03-19 | Stop reason: SDUPTHER

## 2023-03-17 RX ORDER — AMLODIPINE BESYLATE 5 MG/1
5 TABLET ORAL DAILY
Status: DISCONTINUED | OUTPATIENT
Start: 2023-03-17 | End: 2023-03-17

## 2023-03-17 RX ADMIN — HYDRALAZINE HYDROCHLORIDE 25 MG: 25 TABLET ORAL at 22:01

## 2023-03-17 RX ADMIN — FAMOTIDINE 40 MG: 20 TABLET ORAL at 08:36

## 2023-03-17 RX ADMIN — SODIUM CHLORIDE, PRESERVATIVE FREE 10 ML: 5 INJECTION INTRAVENOUS at 08:37

## 2023-03-17 RX ADMIN — CARVEDILOL 12.5 MG: 6.25 TABLET, FILM COATED ORAL at 17:34

## 2023-03-17 RX ADMIN — HYDRALAZINE HYDROCHLORIDE 25 MG: 25 TABLET ORAL at 14:29

## 2023-03-17 RX ADMIN — DOCUSATE SODIUM 100 MG: 100 CAPSULE, LIQUID FILLED ORAL at 08:36

## 2023-03-17 RX ADMIN — TERAZOSIN HYDROCHLORIDE 5 MG: 5 CAPSULE ORAL at 23:37

## 2023-03-17 RX ADMIN — MULTIPLE VITAMINS W/ MINERALS TAB 1 TABLET: TAB at 08:36

## 2023-03-17 RX ADMIN — SODIUM CHLORIDE: 9 INJECTION, SOLUTION INTRAVENOUS at 01:39

## 2023-03-17 RX ADMIN — ACETAMINOPHEN 650 MG: 325 TABLET ORAL at 23:42

## 2023-03-17 RX ADMIN — LATANOPROST 1 DROP: 50 SOLUTION OPHTHALMIC at 23:37

## 2023-03-17 RX ADMIN — AMLODIPINE BESYLATE 5 MG: 5 TABLET ORAL at 08:36

## 2023-03-17 RX ADMIN — SODIUM CHLORIDE: 9 INJECTION, SOLUTION INTRAVENOUS at 06:07

## 2023-03-17 RX ADMIN — CARVEDILOL 12.5 MG: 6.25 TABLET, FILM COATED ORAL at 08:35

## 2023-03-17 RX ADMIN — SODIUM ZIRCONIUM CYCLOSILICATE 10 G: 10 POWDER, FOR SUSPENSION ORAL at 15:55

## 2023-03-17 RX ADMIN — INSULIN HUMAN 5 UNITS: 100 INJECTION, SOLUTION PARENTERAL at 01:45

## 2023-03-17 RX ADMIN — CALCIUM GLUCONATE 1000 MG: 20 INJECTION, SOLUTION INTRAVENOUS at 01:44

## 2023-03-17 RX ADMIN — CEFEPIME 2000 MG: 2 INJECTION, POWDER, FOR SOLUTION INTRAVENOUS at 02:55

## 2023-03-17 RX ADMIN — CEFEPIME 2000 MG: 2 INJECTION, POWDER, FOR SOLUTION INTRAVENOUS at 14:29

## 2023-03-17 RX ADMIN — ACETAMINOPHEN 650 MG: 325 TABLET ORAL at 10:48

## 2023-03-17 RX ADMIN — HYDRALAZINE HYDROCHLORIDE 25 MG: 25 TABLET ORAL at 08:36

## 2023-03-17 RX ADMIN — SODIUM ZIRCONIUM CYCLOSILICATE 10 G: 10 POWDER, FOR SUSPENSION ORAL at 22:01

## 2023-03-17 RX ADMIN — SODIUM ZIRCONIUM CYCLOSILICATE 10 G: 10 POWDER, FOR SUSPENSION ORAL at 08:34

## 2023-03-17 RX ADMIN — SODIUM CHLORIDE, PRESERVATIVE FREE 10 ML: 5 INJECTION INTRAVENOUS at 22:01

## 2023-03-17 ASSESSMENT — PAIN DESCRIPTION - DESCRIPTORS: DESCRIPTORS: DULL

## 2023-03-17 ASSESSMENT — PAIN SCALES - GENERAL
PAINLEVEL_OUTOF10: 0
PAINLEVEL_OUTOF10: 3
PAINLEVEL_OUTOF10: 3

## 2023-03-17 ASSESSMENT — PAIN - FUNCTIONAL ASSESSMENT: PAIN_FUNCTIONAL_ASSESSMENT: ACTIVITIES ARE NOT PREVENTED

## 2023-03-17 ASSESSMENT — PAIN DESCRIPTION - ORIENTATION: ORIENTATION: RIGHT;POSTERIOR

## 2023-03-17 ASSESSMENT — PAIN DESCRIPTION - LOCATION: LOCATION: EAR

## 2023-03-17 NOTE — ED PROVIDER NOTES
(ZOFRAN) 4 MG tablet Take 4 mg by mouth as needed for Nausea or Vomiting      Oxygen Concentrator       hydrALAZINE (APRESOLINE) 25 MG tablet Take 1 tablet by mouth 3 times daily 90 tablet 0    terazosin (HYTRIN) 5 MG capsule Take 1 capsule by mouth nightly 30 capsule 3    warfarin (COUMADIN) 5 MG tablet Take 1 tablet by mouth daily 30 tablet 3     No Known Allergies    Nursing Notes Reviewed    Physical Exam:  ED Triage Vitals [03/16/23 2302]   Enc Vitals Group      BP (!) 150/62      Heart Rate 93      Resp 18      Temp 98.2 °F (36.8 °C)      Temp Source Oral      SpO2 97 %      Weight       Height       Head Circumference       Peak Flow       Pain Score       Pain Loc       Pain Edu? Excl. in [address]? GENERAL APPEARANCE: Awake and alert. Cooperative. No acute distress. HEENT: Head NC/AT, EOM's grossly intact. Conjunctiva anicteric. Mucous membranes moist. Tolerates saliva. No trismus. Neck supple. Trachea midline. HEART: RRR. Radial pulses 2+. LUNGS: Respirations unlabored. CTAB  ABDOMEN: Soft. Non-tender. No guarding or rebound. EXTREMITIES: No acute deformities. SKIN: Warm and dry. NEUROLOGICAL: No gross facial drooping. Moves all 4 extremities spontaneously. PSYCHIATRIC: Normal mood.     I have reviewed and interpreted all of the currently available lab results from this visit (if applicable):  Results for orders placed or performed during the hospital encounter of 03/16/23   CBC with Auto Differential   Result Value Ref Range    WBC 7.1 4.0 - 10.5 K/CU MM    RBC 3.03 (L) 4.6 - 6.2 M/CU MM    Hemoglobin 8.5 (L) 13.5 - 18.0 GM/DL    Hematocrit 29.2 (L) 42 - 52 %    MCV 96.4 78 - 100 FL    MCH 28.1 27 - 31 PG    MCHC 29.1 (L) 32.0 - 36.0 %    RDW 15.0 (H) 11.7 - 14.9 %    Platelets 190 557 - 238 K/CU MM    MPV 9.5 7.5 - 11.1 FL    Differential Type AUTOMATED DIFFERENTIAL     Segs Relative 55.9 36 - 66 %    Lymphocytes % 27.8 24 - 44 %    Monocytes % 12.3 (H) 0 - 4 %    Eosinophils % 3.0 0 - 3 %

## 2023-03-17 NOTE — ED NOTES
ED TO INPATIENT SBAR HANDOFF    Patient Name: Ebonie Araujo   :  1935  80 y.o. MRN:  [id number]  Preferred Name  Kathi Miller  ED Room #:  ED31/ED-31  Family/Caregiver Present yes   Restraints no   Sitter no   Sepsis Risk Score Sepsis Risk Score: 3.8    Situation  Code Status: Full Code No additional code details. Allergies: Patient has no known allergies. Weight: Patient Vitals for the past 96 hrs (Last 3 readings):   Weight   23 0826 255 lb (115.7 kg)     Arrived from: nursing home  Chief Complaint:   Chief Complaint   Patient presents with    Urinary Tract Infection     Sent from [facility] stating a need for IV abx due to recent UTI.   Finished oral abx      Imaging:   No orders to display     Abnormal labs:   Abnormal Labs Reviewed   CBC WITH AUTO DIFFERENTIAL - Abnormal; Notable for the following components:       Result Value    RBC 3.03 (*)     Hemoglobin 8.5 (*)     Hematocrit 29.2 (*)     MCHC 29.1 (*)     RDW 15.0 (*)     Monocytes % 12.3 (*)     All other components within normal limits   COMPREHENSIVE METABOLIC PANEL W/ REFLEX TO MG FOR LOW K - Abnormal; Notable for the following components:    Potassium 5.6 (*)     Chloride 113 (*)     BUN 41 (*)     Creatinine 1.9 (*)     Est, Glom Filt Rate 34 (*)     Glucose 136 (*)     Calcium 7.5 (*)     ALT 7 (*)     AST 11 (*)     All other components within normal limits   C-REACTIVE PROTEIN - Abnormal; Notable for the following components:    CRP High Sensitivity 8.5 (*)     All other components within normal limits   PROTIME-INR - Abnormal; Notable for the following components:    Protime 50.9 (*)     All other components within normal limits   URINALYSIS - Abnormal; Notable for the following components:    Color, UA RED (*)     Clarity, UA TURBID (*)     Ketones, Urine TRACE (*)     Blood, Urine LARGE NUMBER OR AMOUNT OBSERVED (*)     Protein, UA >300 (*)     Urobilinogen, Urine 2.0 (*)     Nitrite Urine, Quantitative POSITIVE (*)

## 2023-03-17 NOTE — H&P
Medical History:   PMHx   Past Medical History:   Diagnosis Date    Chronic kidney disease     Heart abnormality     Hypertension     UTI (urinary tract infection)      PSHX:  has no past surgical history on file. Allergies: No Known Allergies  Fam HX:  family history includes Cancer in his daughter; Diabetes in his granddaughter and mother; Hypertension in his daughter and mother; Kidney Disease in his mother. Soc HX:   Social History     Socioeconomic History    Marital status: Single   Tobacco Use    Smoking status: Never    Smokeless tobacco: Never   Vaping Use    Vaping Use: Never used   Substance and Sexual Activity    Alcohol use: Never    Drug use: Never       Medications:   Medications:    cefepime  2,000 mg IntraVENous Once    calcium gluconate-NaCl  1,000 mg IntraVENous Once      Infusions:    sodium chloride 100 mL/hr at 03/17/23 0139     PRN Meds:      Labs      CBC:   Recent Labs     03/14/23  0625 03/14/23  0942 03/16/23  2340   WBC 8.1 8.8 7.1   HGB 8.5* 8.5* 8.5*    200 196     BMP:    Recent Labs     03/14/23  0942 03/16/23  0641 03/16/23  2340    144 141   K 6.8* 6.0* 5.6*    114* 113*   CO2 23 22 22   BUN 41* 46* 41*   CREATININE 2.5* 2.2* 1.9*   GLUCOSE 123* 94 136*     Hepatic:   Recent Labs     03/14/23  0942 03/16/23  0641 03/16/23  2340   AST 13* 11* 11*   ALT 7* 7* 7*   BILITOT 0.2 0.3 0.2   ALKPHOS 74 78 83     Lipids: No results found for: CHOL, HDL, TRIG  Hemoglobin A1C: No results found for: LABA1C  TSH: No results found for: TSH  Troponin:   Lab Results   Component Value Date/Time    TROPONINT 0.015 02/25/2023 05:39 AM    TROPONINT 0.017 02/24/2023 05:48 PM    TROPONINT 0.017 02/24/2023 12:16 PM     Lactic Acid: No results for input(s): LACTA in the last 72 hours. BNP: No results for input(s): PROBNP in the last 72 hours.   UA:  Lab Results   Component Value Date/Time    NITRU NEGATIVE 03/16/2023 11:30 PM    COLORU YELLOW 03/16/2023 11:30 PM    [address]

## 2023-03-17 NOTE — ED NOTES
Medication History  [facility]    Patient Name: Diana Zamora 1935     Medication history has been completed by: Fabrice Boogie CPhT    Source(s) of information: medication list provided by [facility] [id number] [facility]    Primary Care Physician: Ramana Gray MD     Pharmacy:     Allergies as of 03/16/2023    (No Known Allergies)        Prior to Admission medications    Medication Sig Start Date End Date Taking?  Authorizing Provider   diphenhydrAMINE (BENADRYL) 25 MG tablet Take 25 mg by mouth nightly   Yes Historical Provider, MD   famotidine (PEPCID) 40 MG tablet Take 40 mg by mouth daily 1/31/23   Historical Provider, MD   finasteride (PROSCAR) 5 MG tablet Take 5 mg by mouth daily 2/22/23   Historical Provider, MD   amLODIPine (NORVASC) 5 MG tablet Take 5 mg by mouth daily    Historical Provider, MD   sacubitril-valsartan (ENTRESTO) 49-51 MG per tablet Take 1 tablet by mouth 2 times daily 1/16/23   Paula Cheng MD   carvedilol (COREG) 12.5 MG tablet Take 1 tablet by mouth 2 times daily (with meals) 1/16/23   Paula Cheng MD   Multiple Vitamins-Minerals (PRESERVISION AREDS 2 PO) Take 2 capsules by mouth daily    Historical Provider, MD   docusate sodium (COLACE) 100 MG capsule Take 100 mg by mouth daily 10/1/22   Historical Provider, MD   latanoprost (XALATAN) 0.005 % ophthalmic solution Place 1 drop into the right eye nightly 9/14/22   Historical Provider, MD   isosorbide dinitrate (ISORDIL) 30 MG tablet Take 30 mg by mouth daily    Historical Provider, MD   ondansetron (ZOFRAN) 4 MG tablet Take 4 mg by mouth as needed for Nausea or Vomiting    Historical Provider, MD   Oxygen Concentrator     Historical Provider, MD   hydrALAZINE (APRESOLINE) 25 MG tablet  Take 25 mg by mouth daily 7/5/22   Tanner Farrell MD   terazosin (HYTRIN) 5 MG capsule Take 1 capsule by mouth nightly 6/21/22   Tanner Farrell MD   warfarin (COUMADIN) 5 MG tablet Take 1 tablet by mouth daily

## 2023-03-18 LAB
ABO/RH: NORMAL
ALBUMIN SERPL-MCNC: 3 GM/DL (ref 3.4–5)
ALP BLD-CCNC: 68 IU/L (ref 40–128)
ALT SERPL-CCNC: 6 U/L (ref 10–40)
ANION GAP SERPL CALCULATED.3IONS-SCNC: 5 MMOL/L (ref 4–16)
ANION GAP SERPL CALCULATED.3IONS-SCNC: 6 MMOL/L (ref 4–16)
ANTIBODY SCREEN: NEGATIVE
AST SERPL-CCNC: 13 IU/L (ref 15–37)
BASOPHILS ABSOLUTE: 0 K/CU MM
BASOPHILS RELATIVE PERCENT: 0.5 % (ref 0–1)
BILIRUB SERPL-MCNC: 0.2 MG/DL (ref 0–1)
BUN SERPL-MCNC: 26 MG/DL (ref 6–23)
BUN SERPL-MCNC: 27 MG/DL (ref 6–23)
CALCIUM SERPL-MCNC: 6.9 MG/DL (ref 8.3–10.6)
CALCIUM SERPL-MCNC: 7 MG/DL (ref 8.3–10.6)
CHLORIDE BLD-SCNC: 112 MMOL/L (ref 99–110)
CHLORIDE BLD-SCNC: 113 MMOL/L (ref 99–110)
CO2: 22 MMOL/L (ref 21–32)
CO2: 22 MMOL/L (ref 21–32)
CREAT SERPL-MCNC: 1.4 MG/DL (ref 0.9–1.3)
CREAT SERPL-MCNC: 1.5 MG/DL (ref 0.9–1.3)
CRP SERPL HS-MCNC: 4.8 MG/L
CULTURE: NORMAL
DIFFERENTIAL TYPE: ABNORMAL
EOSINOPHILS ABSOLUTE: 0.2 K/CU MM
EOSINOPHILS RELATIVE PERCENT: 2.7 % (ref 0–3)
GFR SERPL CREATININE-BSD FRML MDRD: 45 ML/MIN/1.73M2
GFR SERPL CREATININE-BSD FRML MDRD: 49 ML/MIN/1.73M2
GLUCOSE BLD-MCNC: 100 MG/DL (ref 70–99)
GLUCOSE BLD-MCNC: 106 MG/DL (ref 70–99)
GLUCOSE BLD-MCNC: 119 MG/DL (ref 70–99)
GLUCOSE BLD-MCNC: 140 MG/DL (ref 70–99)
GLUCOSE SERPL-MCNC: 108 MG/DL (ref 70–99)
GLUCOSE SERPL-MCNC: 99 MG/DL (ref 70–99)
HCT VFR BLD CALC: 25.9 % (ref 42–52)
HCT VFR BLD CALC: 27.2 % (ref 42–52)
HCT VFR BLD CALC: 28.4 % (ref 42–52)
HEMOCCULT STL QL: POSITIVE
HEMOGLOBIN: 7.4 GM/DL (ref 13.5–18)
HEMOGLOBIN: 7.7 GM/DL (ref 13.5–18)
HEMOGLOBIN: 8.1 GM/DL (ref 13.5–18)
IMMATURE NEUTROPHIL %: 0.3 % (ref 0–0.43)
INR BLD: 3.39 INDEX
LYMPHOCYTES ABSOLUTE: 2.2 K/CU MM
LYMPHOCYTES RELATIVE PERCENT: 30 % (ref 24–44)
Lab: NORMAL
MAGNESIUM: 2.3 MG/DL (ref 1.8–2.4)
MCH RBC QN AUTO: 27.7 PG (ref 27–31)
MCH RBC QN AUTO: 27.8 PG (ref 27–31)
MCHC RBC AUTO-ENTMCNC: 28.3 % (ref 32–36)
MCHC RBC AUTO-ENTMCNC: 28.5 % (ref 32–36)
MCV RBC AUTO: 97.6 FL (ref 78–100)
MCV RBC AUTO: 97.8 FL (ref 78–100)
MONOCYTES ABSOLUTE: 0.9 K/CU MM
MONOCYTES RELATIVE PERCENT: 11.9 % (ref 0–4)
NUCLEATED RBC %: 0 %
PDW BLD-RTO: 14.8 % (ref 11.7–14.9)
PDW BLD-RTO: 14.9 % (ref 11.7–14.9)
PHOSPHORUS: 2.9 MG/DL (ref 2.5–4.9)
PLATELET # BLD: 170 K/CU MM (ref 140–440)
PLATELET # BLD: 180 K/CU MM (ref 140–440)
PMV BLD AUTO: 9.2 FL (ref 7.5–11.1)
PMV BLD AUTO: 9.7 FL (ref 7.5–11.1)
POTASSIUM SERPL-SCNC: 4.9 MMOL/L (ref 3.5–5.1)
POTASSIUM SERPL-SCNC: 5.4 MMOL/L (ref 3.5–5.1)
PROTHROMBIN TIME: 44.3 SECONDS (ref 11.7–14.5)
RBC # BLD: 2.78 M/CU MM (ref 4.6–6.2)
RBC # BLD: 2.91 M/CU MM (ref 4.6–6.2)
SEGMENTED NEUTROPHILS ABSOLUTE COUNT: 4.1 K/CU MM
SEGMENTED NEUTROPHILS RELATIVE PERCENT: 54.6 % (ref 36–66)
SODIUM BLD-SCNC: 140 MMOL/L (ref 135–145)
SODIUM BLD-SCNC: 140 MMOL/L (ref 135–145)
SPECIMEN: NORMAL
TOTAL IMMATURE NEUTOROPHIL: 0.02 K/CU MM
TOTAL NUCLEATED RBC: 0 K/CU MM
TOTAL PROTEIN: 5.3 GM/DL (ref 6.4–8.2)
WBC # BLD: 7.4 K/CU MM (ref 4–10.5)
WBC # BLD: 7.8 K/CU MM (ref 4–10.5)

## 2023-03-18 PROCEDURE — 1200000000 HC SEMI PRIVATE

## 2023-03-18 PROCEDURE — 85025 COMPLETE CBC W/AUTO DIFF WBC: CPT

## 2023-03-18 PROCEDURE — 94761 N-INVAS EAR/PLS OXIMETRY MLT: CPT

## 2023-03-18 PROCEDURE — 82962 GLUCOSE BLOOD TEST: CPT

## 2023-03-18 PROCEDURE — 36415 COLL VENOUS BLD VENIPUNCTURE: CPT

## 2023-03-18 PROCEDURE — 6370000000 HC RX 637 (ALT 250 FOR IP): Performed by: STUDENT IN AN ORGANIZED HEALTH CARE EDUCATION/TRAINING PROGRAM

## 2023-03-18 PROCEDURE — 80053 COMPREHEN METABOLIC PANEL: CPT

## 2023-03-18 PROCEDURE — 83605 ASSAY OF LACTIC ACID: CPT

## 2023-03-18 PROCEDURE — 85018 HEMOGLOBIN: CPT

## 2023-03-18 PROCEDURE — 85610 PROTHROMBIN TIME: CPT

## 2023-03-18 PROCEDURE — 6360000002 HC RX W HCPCS: Performed by: STUDENT IN AN ORGANIZED HEALTH CARE EDUCATION/TRAINING PROGRAM

## 2023-03-18 PROCEDURE — 6370000000 HC RX 637 (ALT 250 FOR IP): Performed by: INTERNAL MEDICINE

## 2023-03-18 PROCEDURE — 2580000003 HC RX 258: Performed by: STUDENT IN AN ORGANIZED HEALTH CARE EDUCATION/TRAINING PROGRAM

## 2023-03-18 PROCEDURE — 80048 BASIC METABOLIC PNL TOTAL CA: CPT

## 2023-03-18 PROCEDURE — 86140 C-REACTIVE PROTEIN: CPT

## 2023-03-18 PROCEDURE — 84100 ASSAY OF PHOSPHORUS: CPT

## 2023-03-18 PROCEDURE — 2700000000 HC OXYGEN THERAPY PER DAY

## 2023-03-18 PROCEDURE — 83735 ASSAY OF MAGNESIUM: CPT

## 2023-03-18 PROCEDURE — 85014 HEMATOCRIT: CPT

## 2023-03-18 RX ADMIN — DOCUSATE SODIUM 100 MG: 100 CAPSULE, LIQUID FILLED ORAL at 09:00

## 2023-03-18 RX ADMIN — SODIUM ZIRCONIUM CYCLOSILICATE 10 G: 10 POWDER, FOR SUSPENSION ORAL at 08:59

## 2023-03-18 RX ADMIN — MULTIPLE VITAMINS W/ MINERALS TAB 1 TABLET: TAB at 09:00

## 2023-03-18 RX ADMIN — CARVEDILOL 12.5 MG: 6.25 TABLET, FILM COATED ORAL at 18:31

## 2023-03-18 RX ADMIN — SODIUM ZIRCONIUM CYCLOSILICATE 10 G: 10 POWDER, FOR SUSPENSION ORAL at 15:03

## 2023-03-18 RX ADMIN — ACETAMINOPHEN 650 MG: 325 TABLET ORAL at 09:07

## 2023-03-18 RX ADMIN — SODIUM CHLORIDE, PRESERVATIVE FREE 10 ML: 5 INJECTION INTRAVENOUS at 09:03

## 2023-03-18 RX ADMIN — HYDRALAZINE HYDROCHLORIDE 25 MG: 25 TABLET ORAL at 09:00

## 2023-03-18 RX ADMIN — FINASTERIDE 5 MG: 5 TABLET, FILM COATED ORAL at 09:00

## 2023-03-18 RX ADMIN — CEFEPIME 2000 MG: 2 INJECTION, POWDER, FOR SOLUTION INTRAVENOUS at 04:12

## 2023-03-18 RX ADMIN — CEFEPIME 2000 MG: 2 INJECTION, POWDER, FOR SOLUTION INTRAVENOUS at 15:10

## 2023-03-18 RX ADMIN — SODIUM ZIRCONIUM CYCLOSILICATE 10 G: 10 POWDER, FOR SUSPENSION ORAL at 21:37

## 2023-03-18 RX ADMIN — TERAZOSIN HYDROCHLORIDE 5 MG: 5 CAPSULE ORAL at 21:37

## 2023-03-18 RX ADMIN — FAMOTIDINE 40 MG: 20 TABLET ORAL at 09:00

## 2023-03-18 RX ADMIN — ISOSORBIDE DINITRATE 30 MG: 20 TABLET ORAL at 09:01

## 2023-03-18 RX ADMIN — HYDRALAZINE HYDROCHLORIDE 25 MG: 25 TABLET ORAL at 21:37

## 2023-03-18 RX ADMIN — LATANOPROST 1 DROP: 50 SOLUTION OPHTHALMIC at 21:38

## 2023-03-18 RX ADMIN — HYDRALAZINE HYDROCHLORIDE 25 MG: 25 TABLET ORAL at 15:03

## 2023-03-18 RX ADMIN — CARVEDILOL 12.5 MG: 6.25 TABLET, FILM COATED ORAL at 09:00

## 2023-03-18 ASSESSMENT — PAIN SCALES - GENERAL: PAINLEVEL_OUTOF10: 4

## 2023-03-18 ASSESSMENT — PAIN DESCRIPTION - LOCATION: LOCATION: HEAD

## 2023-03-18 ASSESSMENT — PAIN DESCRIPTION - DESCRIPTORS: DESCRIPTORS: ACHING

## 2023-03-18 NOTE — PLAN OF CARE
Problem: Safety - Adult  Goal: Free from fall injury  Outcome: Progressing     Problem: Skin/Tissue Integrity  Goal: Absence of new skin breakdown  Description: 1. Monitor for areas of redness and/or skin breakdown  2. Assess vascular access sites hourly  3. Every 4-6 hours minimum:  Change oxygen saturation probe site  4. Every 4-6 hours:  If on nasal continuous positive airway pressure, respiratory therapy assess nares and determine need for appliance change or resting period.   Outcome: Progressing     Problem: ABCDS Injury Assessment  Goal: Absence of physical injury  Outcome: Progressing     Problem: Chronic Conditions and Co-morbidities  Goal: Patient's chronic conditions and co-morbidity symptoms are monitored and maintained or improved  Outcome: Progressing     Problem: Discharge Planning  Goal: Discharge to home or other facility with appropriate resources  Outcome: Progressing     Problem: Pain  Goal: Verbalizes/displays adequate comfort level or baseline comfort level  Outcome: Progressing

## 2023-03-19 LAB
ALBUMIN SERPL-MCNC: 3.1 GM/DL (ref 3.4–5)
ALP BLD-CCNC: 71 IU/L (ref 40–128)
ALT SERPL-CCNC: 6 U/L (ref 10–40)
ANION GAP SERPL CALCULATED.3IONS-SCNC: 6 MMOL/L (ref 4–16)
AST SERPL-CCNC: 12 IU/L (ref 15–37)
BASOPHILS ABSOLUTE: 0 K/CU MM
BASOPHILS ABSOLUTE: 0 K/CU MM
BASOPHILS RELATIVE PERCENT: 0.4 % (ref 0–1)
BASOPHILS RELATIVE PERCENT: 0.5 % (ref 0–1)
BILIRUB SERPL-MCNC: 0.3 MG/DL (ref 0–1)
BUN SERPL-MCNC: 24 MG/DL (ref 6–23)
CALCIUM SERPL-MCNC: 7.1 MG/DL (ref 8.3–10.6)
CHLORIDE BLD-SCNC: 113 MMOL/L (ref 99–110)
CO2: 25 MMOL/L (ref 21–32)
CREAT SERPL-MCNC: 1.6 MG/DL (ref 0.9–1.3)
CRP SERPL HS-MCNC: 7.4 MG/L
DIFFERENTIAL TYPE: ABNORMAL
DIFFERENTIAL TYPE: ABNORMAL
EOSINOPHILS ABSOLUTE: 0.3 K/CU MM
EOSINOPHILS ABSOLUTE: 0.3 K/CU MM
EOSINOPHILS RELATIVE PERCENT: 3.8 % (ref 0–3)
EOSINOPHILS RELATIVE PERCENT: 4 % (ref 0–3)
GFR SERPL CREATININE-BSD FRML MDRD: 41 ML/MIN/1.73M2
GLUCOSE BLD-MCNC: 100 MG/DL (ref 70–99)
GLUCOSE BLD-MCNC: 131 MG/DL (ref 70–99)
GLUCOSE BLD-MCNC: 157 MG/DL (ref 70–99)
GLUCOSE BLD-MCNC: 163 MG/DL (ref 70–99)
GLUCOSE SERPL-MCNC: 90 MG/DL (ref 70–99)
HCT VFR BLD CALC: 24.8 % (ref 42–52)
HCT VFR BLD CALC: 25.8 % (ref 42–52)
HEMOGLOBIN: 7.3 GM/DL (ref 13.5–18)
HEMOGLOBIN: 7.4 GM/DL (ref 13.5–18)
IMMATURE NEUTROPHIL %: 0.1 % (ref 0–0.43)
IMMATURE NEUTROPHIL %: 0.4 % (ref 0–0.43)
INR BLD: 2.07 INDEX
LYMPHOCYTES ABSOLUTE: 1.8 K/CU MM
LYMPHOCYTES ABSOLUTE: 2.1 K/CU MM
LYMPHOCYTES RELATIVE PERCENT: 24.5 % (ref 24–44)
LYMPHOCYTES RELATIVE PERCENT: 26.8 % (ref 24–44)
MCH RBC QN AUTO: 27.9 PG (ref 27–31)
MCH RBC QN AUTO: 28.6 PG (ref 27–31)
MCHC RBC AUTO-ENTMCNC: 28.7 % (ref 32–36)
MCHC RBC AUTO-ENTMCNC: 29.4 % (ref 32–36)
MCV RBC AUTO: 97.3 FL (ref 78–100)
MCV RBC AUTO: 97.4 FL (ref 78–100)
MONOCYTES ABSOLUTE: 0.9 K/CU MM
MONOCYTES ABSOLUTE: 1.1 K/CU MM
MONOCYTES RELATIVE PERCENT: 11.4 % (ref 0–4)
MONOCYTES RELATIVE PERCENT: 14.7 % (ref 0–4)
NUCLEATED RBC %: 0 %
NUCLEATED RBC %: 0 %
PDW BLD-RTO: 14.6 % (ref 11.7–14.9)
PDW BLD-RTO: 14.6 % (ref 11.7–14.9)
PLATELET # BLD: 157 K/CU MM (ref 140–440)
PLATELET # BLD: 165 K/CU MM (ref 140–440)
PMV BLD AUTO: 9.4 FL (ref 7.5–11.1)
PMV BLD AUTO: 9.5 FL (ref 7.5–11.1)
POTASSIUM SERPL-SCNC: 4.7 MMOL/L (ref 3.5–5.1)
PRO-BNP: 5267 PG/ML
PROTHROMBIN TIME: 26.9 SECONDS (ref 11.7–14.5)
RBC # BLD: 2.55 M/CU MM (ref 4.6–6.2)
RBC # BLD: 2.65 M/CU MM (ref 4.6–6.2)
SEGMENTED NEUTROPHILS ABSOLUTE COUNT: 4.2 K/CU MM
SEGMENTED NEUTROPHILS ABSOLUTE COUNT: 4.4 K/CU MM
SEGMENTED NEUTROPHILS RELATIVE PERCENT: 56.5 % (ref 36–66)
SEGMENTED NEUTROPHILS RELATIVE PERCENT: 56.9 % (ref 36–66)
SODIUM BLD-SCNC: 144 MMOL/L (ref 135–145)
TOTAL IMMATURE NEUTOROPHIL: 0.01 K/CU MM
TOTAL IMMATURE NEUTOROPHIL: 0.03 K/CU MM
TOTAL NUCLEATED RBC: 0 K/CU MM
TOTAL NUCLEATED RBC: 0 K/CU MM
TOTAL PROTEIN: 5.3 GM/DL (ref 6.4–8.2)
WBC # BLD: 7.4 K/CU MM (ref 4–10.5)
WBC # BLD: 7.7 K/CU MM (ref 4–10.5)

## 2023-03-19 PROCEDURE — 82962 GLUCOSE BLOOD TEST: CPT

## 2023-03-19 PROCEDURE — 2700000000 HC OXYGEN THERAPY PER DAY

## 2023-03-19 PROCEDURE — 2580000003 HC RX 258: Performed by: STUDENT IN AN ORGANIZED HEALTH CARE EDUCATION/TRAINING PROGRAM

## 2023-03-19 PROCEDURE — 85610 PROTHROMBIN TIME: CPT

## 2023-03-19 PROCEDURE — 83516 IMMUNOASSAY NONANTIBODY: CPT

## 2023-03-19 PROCEDURE — 84165 PROTEIN E-PHORESIS SERUM: CPT

## 2023-03-19 PROCEDURE — 83883 ASSAY NEPHELOMETRY NOT SPEC: CPT

## 2023-03-19 PROCEDURE — 86140 C-REACTIVE PROTEIN: CPT

## 2023-03-19 PROCEDURE — 86255 FLUORESCENT ANTIBODY SCREEN: CPT

## 2023-03-19 PROCEDURE — 80053 COMPREHEN METABOLIC PANEL: CPT

## 2023-03-19 PROCEDURE — 6370000000 HC RX 637 (ALT 250 FOR IP): Performed by: SURGERY

## 2023-03-19 PROCEDURE — 36415 COLL VENOUS BLD VENIPUNCTURE: CPT

## 2023-03-19 PROCEDURE — 1200000000 HC SEMI PRIVATE

## 2023-03-19 PROCEDURE — 97535 SELF CARE MNGMENT TRAINING: CPT

## 2023-03-19 PROCEDURE — 6370000000 HC RX 637 (ALT 250 FOR IP): Performed by: STUDENT IN AN ORGANIZED HEALTH CARE EDUCATION/TRAINING PROGRAM

## 2023-03-19 PROCEDURE — 83880 ASSAY OF NATRIURETIC PEPTIDE: CPT

## 2023-03-19 PROCEDURE — 6360000002 HC RX W HCPCS: Performed by: STUDENT IN AN ORGANIZED HEALTH CARE EDUCATION/TRAINING PROGRAM

## 2023-03-19 PROCEDURE — 6360000002 HC RX W HCPCS: Performed by: SURGERY

## 2023-03-19 PROCEDURE — 84155 ASSAY OF PROTEIN SERUM: CPT

## 2023-03-19 PROCEDURE — 97530 THERAPEUTIC ACTIVITIES: CPT

## 2023-03-19 PROCEDURE — 94761 N-INVAS EAR/PLS OXIMETRY MLT: CPT

## 2023-03-19 PROCEDURE — 86160 COMPLEMENT ANTIGEN: CPT

## 2023-03-19 PROCEDURE — 2580000003 HC RX 258: Performed by: SURGERY

## 2023-03-19 PROCEDURE — 85025 COMPLETE CBC W/AUTO DIFF WBC: CPT

## 2023-03-19 RX ORDER — FOLIC ACID 1 MG/1
1 TABLET ORAL DAILY
Qty: 30 TABLET | Refills: 3 | Status: SHIPPED | OUTPATIENT
Start: 2023-03-19

## 2023-03-19 RX ORDER — FOLIC ACID 1 MG/1
1 TABLET ORAL DAILY
Status: DISCONTINUED | OUTPATIENT
Start: 2023-03-19 | End: 2023-03-20 | Stop reason: HOSPADM

## 2023-03-19 RX ORDER — FERROUS SULFATE 325(65) MG
325 TABLET ORAL
Status: DISCONTINUED | OUTPATIENT
Start: 2023-03-19 | End: 2023-03-20 | Stop reason: HOSPADM

## 2023-03-19 RX ORDER — SODIUM CHLORIDE 0.9 % (FLUSH) 0.9 %
5-40 SYRINGE (ML) INJECTION EVERY 12 HOURS SCHEDULED
Status: DISCONTINUED | OUTPATIENT
Start: 2023-03-19 | End: 2023-03-20 | Stop reason: HOSPADM

## 2023-03-19 RX ORDER — DIPHENHYDRAMINE HCL 25 MG
25 TABLET ORAL DAILY PRN
Status: DISCONTINUED | OUTPATIENT
Start: 2023-03-19 | End: 2023-03-20 | Stop reason: HOSPADM

## 2023-03-19 RX ORDER — CYANOCOBALAMIN 1000 UG/ML
1000 INJECTION, SOLUTION INTRAMUSCULAR; SUBCUTANEOUS ONCE
Qty: 1 ML | Refills: 0 | Status: SHIPPED | OUTPATIENT
Start: 2023-03-19 | End: 2023-03-19

## 2023-03-19 RX ORDER — SODIUM CHLORIDE 0.9 % (FLUSH) 0.9 %
5-40 SYRINGE (ML) INJECTION PRN
Status: DISCONTINUED | OUTPATIENT
Start: 2023-03-19 | End: 2023-03-20 | Stop reason: HOSPADM

## 2023-03-19 RX ORDER — FERROUS SULFATE 325(65) MG
325 TABLET ORAL
Qty: 30 TABLET | Refills: 3 | Status: SHIPPED | OUTPATIENT
Start: 2023-03-19

## 2023-03-19 RX ORDER — SODIUM CHLORIDE 9 MG/ML
INJECTION, SOLUTION INTRAVENOUS PRN
Status: DISCONTINUED | OUTPATIENT
Start: 2023-03-19 | End: 2023-03-20 | Stop reason: HOSPADM

## 2023-03-19 RX ORDER — LIDOCAINE HYDROCHLORIDE 10 MG/ML
5 INJECTION, SOLUTION EPIDURAL; INFILTRATION; INTRACAUDAL; PERINEURAL ONCE
Status: DISCONTINUED | OUTPATIENT
Start: 2023-03-19 | End: 2023-03-20 | Stop reason: HOSPADM

## 2023-03-19 RX ORDER — WARFARIN SODIUM 3 MG/1
3 TABLET ORAL DAILY
Status: DISCONTINUED | OUTPATIENT
Start: 2023-03-19 | End: 2023-03-20

## 2023-03-19 RX ORDER — CYANOCOBALAMIN 1000 UG/ML
1000 INJECTION, SOLUTION INTRAMUSCULAR; SUBCUTANEOUS ONCE
Status: COMPLETED | OUTPATIENT
Start: 2023-03-19 | End: 2023-03-19

## 2023-03-19 RX ADMIN — HYDRALAZINE HYDROCHLORIDE 25 MG: 25 TABLET ORAL at 10:47

## 2023-03-19 RX ADMIN — CARVEDILOL 12.5 MG: 6.25 TABLET, FILM COATED ORAL at 10:47

## 2023-03-19 RX ADMIN — FAMOTIDINE 40 MG: 20 TABLET ORAL at 10:48

## 2023-03-19 RX ADMIN — CYANOCOBALAMIN 1000 MCG: 1000 INJECTION, SOLUTION INTRAMUSCULAR at 10:48

## 2023-03-19 RX ADMIN — CARVEDILOL 12.5 MG: 6.25 TABLET, FILM COATED ORAL at 16:30

## 2023-03-19 RX ADMIN — FERROUS SULFATE TAB 325 MG (65 MG ELEMENTAL FE) 325 MG: 325 (65 FE) TAB at 10:47

## 2023-03-19 RX ADMIN — MULTIPLE VITAMINS W/ MINERALS TAB 1 TABLET: TAB at 10:47

## 2023-03-19 RX ADMIN — CEFEPIME 2000 MG: 2 INJECTION, POWDER, FOR SOLUTION INTRAVENOUS at 02:54

## 2023-03-19 RX ADMIN — TERAZOSIN HYDROCHLORIDE 5 MG: 5 CAPSULE ORAL at 20:55

## 2023-03-19 RX ADMIN — SODIUM CHLORIDE, PRESERVATIVE FREE 10 ML: 5 INJECTION INTRAVENOUS at 20:56

## 2023-03-19 RX ADMIN — CEFEPIME 2000 MG: 2 INJECTION, POWDER, FOR SOLUTION INTRAVENOUS at 14:06

## 2023-03-19 RX ADMIN — FINASTERIDE 5 MG: 5 TABLET, FILM COATED ORAL at 10:48

## 2023-03-19 RX ADMIN — ISOSORBIDE DINITRATE 30 MG: 20 TABLET ORAL at 10:47

## 2023-03-19 RX ADMIN — DOCUSATE SODIUM 100 MG: 100 CAPSULE, LIQUID FILLED ORAL at 10:48

## 2023-03-19 RX ADMIN — HYDRALAZINE HYDROCHLORIDE 25 MG: 25 TABLET ORAL at 20:55

## 2023-03-19 RX ADMIN — FOLIC ACID 1 MG: 1 TABLET ORAL at 10:47

## 2023-03-19 RX ADMIN — SODIUM CHLORIDE, PRESERVATIVE FREE 10 ML: 5 INJECTION INTRAVENOUS at 10:48

## 2023-03-19 RX ADMIN — HYDRALAZINE HYDROCHLORIDE 25 MG: 25 TABLET ORAL at 14:06

## 2023-03-19 RX ADMIN — LATANOPROST 1 DROP: 50 SOLUTION OPHTHALMIC at 20:55

## 2023-03-19 RX ADMIN — WARFARIN SODIUM 3 MG: 2 TABLET ORAL at 16:28

## 2023-03-19 ASSESSMENT — PAIN SCALES - GENERAL: PAINLEVEL_OUTOF10: 0

## 2023-03-19 NOTE — DISCHARGE SUMMARY
expressing symptoms of generalized weakness and difficulty with urination consistent with his usual UTI symptoms  - ID consult  - Cefepime  - follow cultures     CKD stage G4  Hyperkalemia  - nephrology following  - hyper K possibly from recent Bactrim and Entresto     Hematuria  Blood-streaked stool  - holding warfarin, no need for reversal  - urology consulted        Severe cardiomyopathy  Afib  Chronic hypoxia  Legally blind     Ppx: heparin 5000 q8hrs  Dispo: warfarin, holding due to bleeding    The patient expressed appropriate understanding of, and agreement with the discharge recommendations, medications, and plan. Consults this admission:  IP CONSULT TO CARDIOLOGY  IP CONSULT TO NEPHROLOGY  IP CONSULT TO PHARMACY  IP CONSULT TO INFECTIOUS DISEASES  IP CONSULT TO UROLOGY    Discharge Instructions: Follow up appointments: gastroenterology, nephrology.  urology  Primary care physician: Rosa Olivares MD within 2 weeks  Diet: diabetic diet   Activity: activity as tolerated  Disposition: Discharged to:   [x]Home, [][facility], []SNF, []Acute Rehab, []Hospice   Condition on discharge: Stable  Labs and Tests to be Followed up as an outpatient by PCP or Specialist: none    Discharge Medications:        Medication List        START taking these medications      cyanocobalamin 1000 MCG/ML injection  Inject 1 mL into the muscle once for 1 dose     ferrous sulfate 325 (65 Fe) MG tablet  Commonly known as: IRON 325  Take 1 tablet by mouth daily (with breakfast)     folic acid 1 MG tablet  Commonly known as: FOLVITE  Take 1 tablet by mouth daily            CHANGE how you take these medications      hydrALAZINE 25 MG tablet  Commonly known as: APRESOLINE  Take 1 tablet by mouth 3 times daily  What changed: when to take this            CONTINUE taking these medications      amLODIPine 5 MG tablet  Commonly known as: NORVASC     carvedilol 12.5 MG tablet  Commonly known as: COREG  Take 1 tablet by mouth 2 times daily (with

## 2023-03-20 VITALS
HEIGHT: 76 IN | OXYGEN SATURATION: 100 % | SYSTOLIC BLOOD PRESSURE: 148 MMHG | DIASTOLIC BLOOD PRESSURE: 63 MMHG | RESPIRATION RATE: 16 BRPM | HEART RATE: 89 BPM | BODY MASS INDEX: 31.05 KG/M2 | WEIGHT: 255 LBS | TEMPERATURE: 98.5 F

## 2023-03-20 LAB
ALBUMIN SERPL-MCNC: 3.1 GM/DL (ref 3.4–5)
ALP BLD-CCNC: 71 IU/L (ref 40–128)
ALT SERPL-CCNC: 6 U/L (ref 10–40)
ANION GAP SERPL CALCULATED.3IONS-SCNC: 5 MMOL/L (ref 4–16)
AST SERPL-CCNC: 14 IU/L (ref 15–37)
BASOPHILS ABSOLUTE: 0 K/CU MM
BASOPHILS RELATIVE PERCENT: 0.5 % (ref 0–1)
BILIRUB SERPL-MCNC: 0.2 MG/DL (ref 0–1)
BUN SERPL-MCNC: 24 MG/DL (ref 6–23)
CALCIUM SERPL-MCNC: 7.2 MG/DL (ref 8.3–10.6)
CHLORIDE BLD-SCNC: 113 MMOL/L (ref 99–110)
CO2: 24 MMOL/L (ref 21–32)
CREAT SERPL-MCNC: 1.5 MG/DL (ref 0.9–1.3)
CRP SERPL HS-MCNC: 9.7 MG/L
DIFFERENTIAL TYPE: ABNORMAL
EOSINOPHILS ABSOLUTE: 0.3 K/CU MM
EOSINOPHILS RELATIVE PERCENT: 4 % (ref 0–3)
GFR SERPL CREATININE-BSD FRML MDRD: 45 ML/MIN/1.73M2
GLUCOSE BLD-MCNC: 108 MG/DL (ref 70–99)
GLUCOSE BLD-MCNC: 140 MG/DL (ref 70–99)
GLUCOSE SERPL-MCNC: 95 MG/DL (ref 70–99)
HCT VFR BLD CALC: 24.9 % (ref 42–52)
HEMOCCULT SP1 STL QL: POSITIVE
HEMOGLOBIN: 7.2 GM/DL (ref 13.5–18)
IMMATURE NEUTROPHIL %: 0.5 % (ref 0–0.43)
INR BLD: 1.45 INDEX
LYMPHOCYTES ABSOLUTE: 1.9 K/CU MM
LYMPHOCYTES RELATIVE PERCENT: 25.1 % (ref 24–44)
MCH RBC QN AUTO: 28.2 PG (ref 27–31)
MCHC RBC AUTO-ENTMCNC: 28.9 % (ref 32–36)
MCV RBC AUTO: 97.6 FL (ref 78–100)
MONOCYTES ABSOLUTE: 1 K/CU MM
MONOCYTES RELATIVE PERCENT: 13.4 % (ref 0–4)
NUCLEATED RBC %: 0 %
PDW BLD-RTO: 14.6 % (ref 11.7–14.9)
PLATELET # BLD: 165 K/CU MM (ref 140–440)
PMV BLD AUTO: 9.5 FL (ref 7.5–11.1)
POTASSIUM SERPL-SCNC: 4.7 MMOL/L (ref 3.5–5.1)
PROTHROMBIN TIME: 18.8 SECONDS (ref 11.7–14.5)
RBC # BLD: 2.55 M/CU MM (ref 4.6–6.2)
SARS-COV-2 RDRP RESP QL NAA+PROBE: NOT DETECTED
SEGMENTED NEUTROPHILS ABSOLUTE COUNT: 4.2 K/CU MM
SEGMENTED NEUTROPHILS RELATIVE PERCENT: 56.5 % (ref 36–66)
SODIUM BLD-SCNC: 142 MMOL/L (ref 135–145)
SOURCE: NORMAL
TOTAL IMMATURE NEUTOROPHIL: 0.04 K/CU MM
TOTAL NUCLEATED RBC: 0 K/CU MM
TOTAL PROTEIN: 5.3 GM/DL (ref 6.4–8.2)
TOTAL RETICULOCYTE COUNT: 0.05 K/CU MM
WBC # BLD: 7.5 K/CU MM (ref 4–10.5)

## 2023-03-20 PROCEDURE — 6370000000 HC RX 637 (ALT 250 FOR IP): Performed by: STUDENT IN AN ORGANIZED HEALTH CARE EDUCATION/TRAINING PROGRAM

## 2023-03-20 PROCEDURE — 2700000000 HC OXYGEN THERAPY PER DAY

## 2023-03-20 PROCEDURE — 80053 COMPREHEN METABOLIC PANEL: CPT

## 2023-03-20 PROCEDURE — 76937 US GUIDE VASCULAR ACCESS: CPT

## 2023-03-20 PROCEDURE — 2580000003 HC RX 258: Performed by: SURGERY

## 2023-03-20 PROCEDURE — 36410 VNPNXR 3YR/> PHY/QHP DX/THER: CPT

## 2023-03-20 PROCEDURE — 86140 C-REACTIVE PROTEIN: CPT

## 2023-03-20 PROCEDURE — 99232 SBSQ HOSP IP/OBS MODERATE 35: CPT | Performed by: NURSE PRACTITIONER

## 2023-03-20 PROCEDURE — 05HF33Z INSERTION OF INFUSION DEVICE INTO LEFT CEPHALIC VEIN, PERCUTANEOUS APPROACH: ICD-10-PCS | Performed by: SURGERY

## 2023-03-20 PROCEDURE — 6360000002 HC RX W HCPCS

## 2023-03-20 PROCEDURE — 2580000003 HC RX 258: Performed by: STUDENT IN AN ORGANIZED HEALTH CARE EDUCATION/TRAINING PROGRAM

## 2023-03-20 PROCEDURE — 2709999900 HC NON-CHARGEABLE SUPPLY

## 2023-03-20 PROCEDURE — 2500000003 HC RX 250 WO HCPCS

## 2023-03-20 PROCEDURE — 85025 COMPLETE CBC W/AUTO DIFF WBC: CPT

## 2023-03-20 PROCEDURE — 85610 PROTHROMBIN TIME: CPT

## 2023-03-20 PROCEDURE — 6360000004 HC RX CONTRAST MEDICATION

## 2023-03-20 PROCEDURE — 36415 COLL VENOUS BLD VENIPUNCTURE: CPT

## 2023-03-20 PROCEDURE — 6360000002 HC RX W HCPCS: Performed by: STUDENT IN AN ORGANIZED HEALTH CARE EDUCATION/TRAINING PROGRAM

## 2023-03-20 PROCEDURE — 6370000000 HC RX 637 (ALT 250 FOR IP): Performed by: SURGERY

## 2023-03-20 PROCEDURE — 82962 GLUCOSE BLOOD TEST: CPT

## 2023-03-20 PROCEDURE — 87635 SARS-COV-2 COVID-19 AMP PRB: CPT

## 2023-03-20 PROCEDURE — 94761 N-INVAS EAR/PLS OXIMETRY MLT: CPT

## 2023-03-20 RX ORDER — PROMETHAZINE HYDROCHLORIDE 25 MG/ML
25 INJECTION, SOLUTION INTRAMUSCULAR; INTRAVENOUS ONCE
Status: DISCONTINUED | OUTPATIENT
Start: 2023-03-20 | End: 2023-03-20 | Stop reason: HOSPADM

## 2023-03-20 RX ADMIN — CEFEPIME 2000 MG: 2 INJECTION, POWDER, FOR SOLUTION INTRAVENOUS at 03:45

## 2023-03-20 RX ADMIN — DOCUSATE SODIUM 100 MG: 100 CAPSULE, LIQUID FILLED ORAL at 09:07

## 2023-03-20 RX ADMIN — FINASTERIDE 5 MG: 5 TABLET, FILM COATED ORAL at 09:08

## 2023-03-20 RX ADMIN — CEFEPIME 2000 MG: 2 INJECTION, POWDER, FOR SOLUTION INTRAVENOUS at 14:58

## 2023-03-20 RX ADMIN — SODIUM CHLORIDE: 9 INJECTION, SOLUTION INTRAVENOUS at 03:44

## 2023-03-20 RX ADMIN — MULTIPLE VITAMINS W/ MINERALS TAB 1 TABLET: TAB at 09:07

## 2023-03-20 RX ADMIN — HYDRALAZINE HYDROCHLORIDE 25 MG: 25 TABLET ORAL at 14:45

## 2023-03-20 RX ADMIN — SODIUM CHLORIDE, PRESERVATIVE FREE 10 ML: 5 INJECTION INTRAVENOUS at 09:08

## 2023-03-20 RX ADMIN — FOLIC ACID 1 MG: 1 TABLET ORAL at 09:07

## 2023-03-20 RX ADMIN — SODIUM CHLORIDE: 9 INJECTION, SOLUTION INTRAVENOUS at 14:57

## 2023-03-20 RX ADMIN — ISOSORBIDE DINITRATE 30 MG: 20 TABLET ORAL at 09:07

## 2023-03-20 RX ADMIN — CARVEDILOL 12.5 MG: 6.25 TABLET, FILM COATED ORAL at 09:07

## 2023-03-20 RX ADMIN — FERROUS SULFATE TAB 325 MG (65 MG ELEMENTAL FE) 325 MG: 325 (65 FE) TAB at 09:07

## 2023-03-20 RX ADMIN — HYDRALAZINE HYDROCHLORIDE 25 MG: 25 TABLET ORAL at 09:07

## 2023-03-20 RX ADMIN — FAMOTIDINE 40 MG: 20 TABLET ORAL at 09:07

## 2023-03-20 NOTE — CARE COORDINATION
Plan is snf ([place] if poss per family) at this time. [facility] referral cancelled.

## 2023-03-20 NOTE — PLAN OF CARE
Problem: Safety - Adult  Goal: Free from fall injury  3/20/2023 1434 by Eleazar Copeland RN  Outcome: Completed  3/20/2023 0152 by Karie Maguire RN  Outcome: Progressing     Problem: Skin/Tissue Integrity  Goal: Absence of new skin breakdown  Description: 1. Monitor for areas of redness and/or skin breakdown  2. Assess vascular access sites hourly  3. Every 4-6 hours minimum:  Change oxygen saturation probe site  4. Every 4-6 hours:  If on nasal continuous positive airway pressure, respiratory therapy assess nares and determine need for appliance change or resting period.   3/20/2023 1434 by Eleazar Copeland RN  Outcome: Completed  3/20/2023 0152 by Karie Maguire RN  Outcome: Progressing     Problem: ABCDS Injury Assessment  Goal: Absence of physical injury  3/20/2023 1434 by Eleazar Copeland RN  Outcome: Completed  3/20/2023 0152 by Karie Maguire RN  Outcome: Progressing     Problem: Chronic Conditions and Co-morbidities  Goal: Patient's chronic conditions and co-morbidity symptoms are monitored and maintained or improved  3/20/2023 1434 by Eleazar Copeland RN  Outcome: Completed  Flowsheets (Taken 3/20/2023 1211)  Care Plan - Patient's Chronic Conditions and Co-Morbidity Symptoms are Monitored and Maintained or Improved: Monitor and assess patient's chronic conditions and comorbid symptoms for stability, deterioration, or improvement  3/20/2023 0152 by Karie Maguire RN  Outcome: Progressing  [address] (Taken 3/19/2023 2049)  Care Plan - Patient's Chronic Conditions and Co-Morbidity Symptoms are Monitored and Maintained or Improved: Monitor and assess patient's chronic conditions and comorbid symptoms for stability, deterioration, or improvement     Problem: Discharge Planning  Goal: Discharge to home or other facility with appropriate resources  3/20/2023 1434 by Eleazar Copeland RN  Outcome: Completed  Flowsheets (Taken 3/20/2023 1211)  Discharge to home or other facility with appropriate resources:

## 2023-03-20 NOTE — CARE COORDINATION
Pt discharged to [facility], set up with Transport for 1530. Updated pt, daughter, nursing and Estefanía Nielson at [employer].

## 2023-03-20 NOTE — CARE COORDINATION
Patient and family now want [facility] instead of [address]. Referral made and PS sent to Dr. Alexandria White. Electronic PASRR completed. [facility] is able to accept patient. PS sent to provider. Patient will need BARBY completed and Rapid covid.

## 2023-03-20 NOTE — PLAN OF CARE
Problem: Safety - Adult  Goal: Free from fall injury  Outcome: Progressing     Problem: Skin/Tissue Integrity  Goal: Absence of new skin breakdown  Description: 1. Monitor for areas of redness and/or skin breakdown  2. Assess vascular access sites hourly  3. Every 4-6 hours minimum:  Change oxygen saturation probe site  4. Every 4-6 hours:  If on nasal continuous positive airway pressure, respiratory therapy assess nares and determine need for appliance change or resting period.   Outcome: Progressing     Problem: ABCDS Injury Assessment  Goal: Absence of physical injury  Outcome: Progressing     Problem: Chronic Conditions and Co-morbidities  Goal: Patient's chronic conditions and co-morbidity symptoms are monitored and maintained or improved  Outcome: Progressing  Flowsheets (Taken 3/19/2023 2049)  Care Plan - Patient's Chronic Conditions and Co-Morbidity Symptoms are Monitored and Maintained or Improved: Monitor and assess patient's chronic conditions and comorbid symptoms for stability, deterioration, or improvement     Problem: Discharge Planning  Goal: Discharge to home or other facility with appropriate resources  Outcome: Progressing  Flowsheets (Taken 3/19/2023 2049)  Discharge to home or other facility with appropriate resources: Identify barriers to discharge with patient and caregiver     Problem: Pain  Goal: Verbalizes/displays adequate comfort level or baseline comfort level  Outcome: Progressing

## 2023-03-20 NOTE — DISCHARGE INSTR - COC
(Rule Out) 03/03/23 03/03/23 03/03/23 Quantiferon, Incubated (Ordered)   03/03/23 Rule-Out Test Canceled            Nurse Assessment:  Last Vital Signs: BP (!) 159/67   Pulse 87   Temp 97.9 °F (36.6 °C) (Oral)   Resp 16   Ht 6' 4\" (1.93 m)   Wt 255 lb (115.7 kg)   SpO2 97%   BMI 31.04 kg/m²     Last documented pain score (0-10 scale): Pain Level: 0  Last Weight:   Wt Readings from Last 1 Encounters:   03/17/23 255 lb (115.7 kg)     Mental Status:  oriented, alert, coherent, logical, thought processes intact, and able to concentrate and follow conversation    IV Access:  - Peripheral IV - site  L Cephalic, insertion date: 3/20/23    Nursing Mobility/ADLs:  Walking   Dependent  Transfer  Dependent  Bathing  Assisted  Dressing  Assisted  Toileting  Assisted  Feeding  Assisted  Med Admin  Assisted  Med Delivery   whole    Wound Care Documentation and Therapy:        Elimination:  Continence: Bowel: Yes  Bladder: Yes  Urinary Catheter: None   Colostomy/Ileostomy/Ileal Conduit: No       Date of Last BM: 3/18/23    Intake/Output Summary (Last 24 hours) at 3/20/2023 1142  Last data filed at 3/20/2023 1102  Gross per 24 hour   Intake --   Output 250 ml   Net -250 ml     I/O last 3 completed shifts:  In: -   Out: 500 [Urine:500]    Safety Concerns:      At Risk for Falls    Impairments/Disabilities:      Vision      Patient's personal belongings (please select all that are sent with patient):  None    RN SIGNATURE:  Electronically signed by Chantale Samano RN on 3/20/23 at 3:07 PM EDT    CASE MANAGEMENT/SOCIAL WORK SECTION    Inpatient Status Date: ***    Readmission Risk Assessment Score:  Readmission Risk              Risk of Unplanned Readmission:  27           Discharging to Facility/ Agency   Name:   Address:  Phone:  Fax:    Dialysis Facility (if applicable)   Name:  Address:  Dialysis Schedule:  Phone:  Fax:    / signature: {Esignature:[id number]}    PHYSICIAN SECTION    Prognosis:

## 2023-03-20 NOTE — PROGRESS NOTES
03/20/23 1040   Encounter Summary   Encounter Overview/Reason  Initial Encounter   Service Provided For: Patient   Referral/Consult From: [facility]   Last Encounter  03/20/23  (stated things are going well.  Denied any needs at this time)   Complexity of Encounter Low   Begin Time 1041   End Time  1046   Total Time Calculated 5 min   Encounter    Type Initial Screen/Assessment   Spiritual/Emotional needs   Type Spiritual Support   Grief, Loss, and Adjustments   Type Adjustment to illness   Assessment/Intervention/Outcome   Assessment Powerlessness   Intervention Active listening;Sustaining Presence/Ministry of presence   Outcome Expressed Gratitude;Coping   Plan and Referrals   Plan/Referrals Continue to visit, (comment)  (as needed)
4 Eyes Skin Assessment     NAME:  Daniela Madden  YOB: 1935  MEDICAL RECORD NUMBER:  [id number]    The patient is being assessed for  Transfer to New Unit    I agree that One RN has performed a thorough Head to Toe Skin Assessment on the patient. ALL assessment sites listed below have been assessed. Areas assessed by both nurses:    Head, Face, Ears, Shoulders, Back, Chest, Arms, Elbows, Hands, Sacrum. Buttock, Coccyx, Ischium, and Legs. Feet and Heels        Does the Patient have a Wound?  No noted wound(s)       James Prevention initiated by RN: No   Wound Care Orders initiated by RN: No    Pressure Injury (Stage 3,4, Unstageable, DTI, NWPT, and Complex wounds) if present, place referral order by RN under : No    New and Established Ostomies, if present place, referral order under : No      Nurse 1 eSignature: Electronically signed by Pam Parks RN on 3/20/23 at 3:44 PM EDT    **SHARE this note so that the co-signing nurse can place an eSignature**    Nurse 2 eSignature: {Esignature:[id number]}
4 Eyes Skin Assessment     NAME:  Ritu Orozco  YOB: 1935  MEDICAL RECORD NUMBER:  [id number]    The patient is being assessed for  Admission    I agree that One RN has performed a thorough Head to Toe Skin Assessment on the patient. ALL assessment sites listed below have been assessed. Areas assessed by both nurses:    Head, Face, Ears, Shoulders, Back, Chest, Arms, Elbows, Hands, Sacrum. Buttock, Coccyx, Ischium, and Legs. Feet and Heels        Does the Patient have a Wound?  No noted wound(s)       James Prevention initiated by RN: No   Wound Care Orders initiated by RN: No    Pressure Injury (Stage 3,4, Unstageable, DTI, NWPT, and Complex wounds) if present, place referral order by RN under : No    New and Established Ostomies, if present place, referral order under : No      Nurse 1 eSignature: Electronically signed by Milton Noland LPN on 2/25/10 at 2:69 PM EDT    **SHARE this note so that the co-signing nurse can place an eSignature**    Nurse 2 eSignature: Electronically signed by Reno Beaver RN on 3/17/23 at 6:02 PM EDT
Call initiated by: Nursing staff:  Surinder Alonzo  Call addressed around: 3/17/2023 9:51 PM      Reason for call: Patient noted with bright red blood in urine and some streeks of blood in stool. His INR was 3.89 today. Goal Inr 2-3 for afib. He did not recive any warfarin today. Orders placed: Hold warfarin. Stat cbc and serial h and h. Type and screen in anticipation for hemotransfusion for goal hgb >7. Occult stool. Consult urology. I discussed my plan to hold warfarin with cardiology and he is ok with that. No need for warfarin reversal per cardio. POC discussed with Dr. Steph Lynne.       Benjamin Payan, APRN - CNP
Dr. Suszanne Klinefelter with nephrology has approved PICC line placement for Mr. Amarilys Dangelo.
Nephrology Progress Note  3/18/2023 8:53 AM        Subjective:   Admit Date: 3/16/2023  PCP: Natalie Aguilar MD    Interval History: No major event but did have some hematuria-did not show hypocellular residual    Diet: Reported eating well    ROS: No confusion, urine output recorded 775 cc for the last 24 hours-recorded blood pressure is acceptable, without fever  Complaint of wheezing      Data:     Current meds:    sodium chloride flush  5-40 mL IntraVENous 2 times per day    carvedilol  12.5 mg Oral BID WC    docusate sodium  100 mg Oral Daily    famotidine  40 mg Oral Daily    finasteride  5 mg Oral Daily    isosorbide dinitrate  30 mg Oral Daily    latanoprost  1 drop Both Eyes Nightly    [Held by provider] sacubitril-valsartan  1 tablet Oral BID    terazosin  5 mg Oral Nightly    cefepime  2,000 mg IntraVENous Q12H    hydrALAZINE  25 mg Oral TID    therapeutic multivitamin-minerals  1 tablet Oral Daily    sodium zirconium cyclosilicate  10 g Oral TID    warfarin placeholder: dosing by pharmacy   Other RX Placeholder      sodium chloride           I/O last 3 completed shifts:   In: 212.8 [P.O.:120; IV Piggyback:92.8]  Out: 775 [Urine:775]    CBC:   Recent Labs     03/16/23  2340 03/17/23  2347 03/18/23  0243   WBC 7.1 7.8 7.4   HGB 8.5* 8.1* 7.7*    180 170          Recent Labs     03/16/23  2340 03/17/23  1445 03/18/23  0243    138 140   K 5.6* 5.9* 5.4*   * 111* 113*   CO2 22 22 22   BUN 41* 29* 27*   CREATININE 1.9* 1.4* 1.5*   GLUCOSE 136* 95 108*       Lab Results   Component Value Date    CALCIUM 7.0 (L) 03/18/2023    PHOS 2.9 03/18/2023       Objective:     Vitals: /65   Pulse 85   Temp 97.8 °F (36.6 °C) (Oral)   Resp 17   Ht 6' 4\" (1.93 m)   Wt 255 lb (115.7 kg)   SpO2 99%   BMI 31.04 kg/m² ,    General appearance: Thin, alert, awake and 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
Nephrology Progress Note  3/20/2023 11:08 AM        Subjective:   Admit Date: 3/16/2023  PCP: Ishmael Milligan MD    Interval History:  patient seen in early morning, this is a late entry   no major event overnight    Diet:  he reported good oral intake    ROS:   no overt shortness of breath, or confusion   urine output was not recorded   variable blood pressure recorded, probably need manual confirmation, but acceptable all things considered, no fever      Data:     Current meds:    promethazine  25 mg IntraMUSCular Once    ferrous sulfate  325 mg Oral Daily with breakfast    folic acid  1 mg Oral Daily    lidocaine PF  5 mL IntraDERmal Once    sodium chloride flush  5-40 mL IntraVENous 2 times per day    warfarin  3 mg Oral Daily    sodium chloride flush  5-40 mL IntraVENous 2 times per day    carvedilol  12.5 mg Oral BID WC    docusate sodium  100 mg Oral Daily    famotidine  40 mg Oral Daily    finasteride  5 mg Oral Daily    isosorbide dinitrate  30 mg Oral Daily    latanoprost  1 drop Both Eyes Nightly    [Held by provider] sacubitril-valsartan  1 tablet Oral BID    terazosin  5 mg Oral Nightly    cefepime  2,000 mg IntraVENous Q12H    hydrALAZINE  25 mg Oral TID    therapeutic multivitamin-minerals  1 tablet Oral Daily      sodium chloride 5 mL/hr at 03/20/23 0344         I/O last 3 completed shifts:  In: -   Out: 500 [Urine:500]    CBC:   Recent Labs     03/19/23  0303 03/19/23  1827 03/20/23  0343   WBC 7.7 7.4 7.5   HGB 7.4* 7.3* 7.2*    157 165          Recent Labs     03/18/23  1600 03/19/23  0303 03/20/23  0343    144 142   K 4.9 4.7 4.7   * 113* 113*   CO2 22 25 24   BUN 26* 24* 24*   CREATININE 1.4* 1.6* 1.5*   GLUCOSE 99 90 95       Lab Results   Component Value Date    CALCIUM 7.2 (L) 03/20/2023    PHOS 2.9 03/18/2023       Objective:     Vitals: BP (!) 159/67   Pulse 87   Temp 97.9 °F (36.6 °C) (Oral)   Resp 16   Ht 6' 4\" (1.93 m)   Wt 255 lb (115.7 kg)   SpO2 97%   BMI 31.04
Occupational Therapy    Occupational Therapy Treatment Note    Name: Isaias Boykin MRN: [id number] :   1935   Date:  3/19/2023   Admission Date: 3/16/2023 Room:  [address] [place], [place] [address]       Restrictions/Precautions:          fall risk, general precautions, contact precautions    Communication with other providers: RN    Subjective:  Patient states:  \"I woke up and had no idea where I was\"  Pain:   Location, Type, Intensity (0/10 to 10/10):  denies    Objective:    Observation: supine in bed, agreeable   Objective Measures:  vitals stable on room air upon arrival, requested to don supplemental O2 at end of tx session stating \"I'm supposed to wear that I just take it off sometimes\"    Treatment, including education:    Self Care Training:   Cues were given for safety, sequence, UE/LE placement, visual cues, and balance. SBA to use urinal while seated EOB. SBA for hand hygiene after completion while seated. Min A to doff/don gown, max A to don socks, and SBA to comb hair while seated EOB. Therapeutic Activity Training:   Therapeutic activity training was instructed today. Cues were given for safety, sequence, UE/LE placement, awareness, and balance. Supine to sitting EOB w/ SBA. STS from EOB w/ min A, Vcs for sequencing hand placement/orientation d/t vision. Static stand ~30 seconds before pt requested return to sitting EOB d/t report of extreme fatigue. Stand to sit w/ min A and Vcs to control descent. Sit to supine w/ SBA. Repositioned self in bed w/ SBA, Vcs for sequencing and positioning. Left supine w/ all needs met, family present. Assessment / Impression:    Patient's tolerance of treatment: Well  Adverse Reaction: None  Significant change in status and impact: None  Barriers to improvement: weakness, unsteadiness on feet, activity tolerance, confusion      Plan for Next Session:    Continue w/ OT POC and functional goals, address safety/independence w/ ADLs and transfers/mobility.
PHARMACY ANTICOAGULATION MONITORING SERVICE    Amara Smith is a 80 y.o. male on warfarin therapy for PAF. Pharmacy consulted by Dr. Catarino Khoury for monitoring and adjustment of treatment. Indication for anticoagulation: PAF  INR goal: 2-3  Warfarin dose prior to admission: warfarin 5 mg daily    Pertinent Laboratory Values   Recent Labs     03/16/23  2340 03/17/23  0609 03/17/23  2347 03/18/23  0243   INR  --    < >  --  3.39   HGB 8.5*  --  8.1* 7.7*   HCT 29.2*  --  28.4* 27.2*     --  180 170    < > = values in this interval not displayed.          Assessment/Plan:  Drug Interactions:  Home medications: no significant interactions noted  Cefepime may increase INR  INR remains supra-therapeutic @ 3.39, trending down  Hgb low @ 7.7, trending down (baseline range appears to be 9-11)  Plan to hold warfarin while INR above goal  Pharmacy will continue to monitor and adjust warfarin therapy as indicated    Thank you for the consult,  Octavio Simmons  3/18/2023 1:59 PM
PHARMACY ANTICOAGULATION MONITORING SERVICE    Caity Jonas is a 80 y.o. male on warfarin therapy for PAF. Pharmacy consulted by Dr. Yamilka Mane for monitoring and adjustment of treatment. Indication for anticoagulation: PAF  INR goal: 2-3  Warfarin dose prior to admission: warfarin 5 mg daily    Pertinent Laboratory Values   Recent Labs     03/17/23  2347 03/18/23  0243 03/18/23  1600 03/19/23  0303   INR  --  3.39  --  2.07   HGB 8.1* 7.7*   < > 7.4*   HCT 28.4* 27.2*   < > 25.8*    170  --  165    < > = values in this interval not displayed.          Assessment/Plan:  Drug Interactions:  Home medications: no significant interactions noted  Cefepime may increase INR  INR therapeutic today @ 2.07  Hgb low @ 7.4, trending down (baseline range appears to be 9-11)  Will restart on reduced home dose of Warfarin 3mg daily; adjust based on INR trends  Pharmacy will continue to monitor and adjust warfarin therapy as indicated    Thank you for the consult,  Olive Del Castillo, [facility]  3/19/2023 3:09 PM
PHARMACY ANTICOAGULATION MONITORING SERVICE    Kylie Faria is a 80 y.o. male on warfarin therapy for PAF. Pharmacy consulted by Dr. Cecelia Harrell for monitoring and adjustment of treatment.     Indication for anticoagulation: PAF  INR goal: 2-3  Warfarin dose prior to admission: warfarin 5 mg daily    Pertinent Laboratory Values   Recent Labs     03/14/23  0942 03/16/23  2340 03/17/23  0609   INR  --   --  3.89   HGB 8.5* 8.5*  --    HCT 29.1* 29.2*  --     196  --        Assessment/Plan:  Drug Interactions:  Home medications: no significant interactions noted  Cefepime may increase INR  INR 3.89, slightly supra-therapeutic  Hgb low @ 8.5, stable (baseline range appears to be 9-11)  Plan to hold warfarin while INR above goal  Pharmacy will continue to monitor and adjust warfarin therapy as indicated    Thank you for the consult,  Cheyanne Catalan, [address] [place] [address], PharmD  3/17/2023 8:59 AM
Patient had a moderate amount of blood on his bed pad when rolling him over to put him on a bed pan. He had a small black bowel movement with streaks of red in it. Patient also has dark red hematuria. Hospitilist notified and new orders placed. Stool sample sent of occult and Urology consult sent. Labs drawn. Patient denies pain other than behind his right ear states it is a dull constant pain and tylenol resolves it but it comes back. Bed in lowest position call light within reach, continuing with plan of care.    Electronically signed by Junior Loyd RN on 3/18/2023 at 1:13 AM
Patient seen and examined at bedside this morning. Admitted earlier today by Dr. Charlie Ortez for UTI. Patient reports nausea and vomiting prior to presentation which is currently resolved. Had breakfast today and tolerated it well. We will continue current management plan with antibiotics for UTI. Continue management for rest of chronic medical conditions. Nephrology and ID following.
Physical Therapy  [facility] ACUTE CARE PHYSICAL THERAPY EVALUATION  Raymundo Toussaint, 1935, 1104/1104-A, 3/17/2023    History  [place]:  The primary encounter diagnosis was Urinary tract infection without hematuria, site unspecified. A diagnosis of Hyperkalemia was also pertinent to this visit. Patient  has a past medical history of Chronic kidney disease, Heart abnormality, Hypertension, and UTI (urinary tract infection). Patient  has no past surgical history on file. Subjective:  Patient states:  \"I'm still getting acclimated\"   Pain:  \"all over\", did not rate   Communication with other providers:   co-eval with Dionisio BRAND   Restrictions: general precautions, falls     Home Setup/Prior level of function  Social/Functional History  Lives With: Daughter (and son-in-law)  Type of Home: House  Home Layout: One level  Home Access: Ramped entrance  Bathroom Shower/Tub:  (has been sponge bathing)  Bathroom Toilet: Standard  Bathroom Equipment: Shower chair  Home Equipment: [employer], [employer] Help From: Family  ADL Assistance: Needs assistance (has a shower aide that comes 2x per week. Pt can typically manage dressing tasks on his own but family or aide help as needed. Has the most trouble with his socks. Inc time needed)  Toileting: Needs assistance  Homemaking Assistance: Needs assistance  Homemaking Responsibilities: No  Ambulation Assistance: Non-ambulatory (Jaleel with WC short distances in the home)  Transfer Assistance: Needs assistance (Only does pivot transfers. Family helps PRN with transfers. Sleeps in a recliner)  Active : No  Patient's  Info: son-in-law  Mode of Transportation: Car  Occupation: Retired  Additional comments: pt admitted from SNF.  Above information taken from prior PT eval on 2/27/23    Examination of body systems (includes body structures/functions, activity/participation limitations):  Observation:  Supine in bed upon arrival. Cooperative with
Report called to [facility], all questions answered at this time. Patient picked up by [facility] with all belongings. Left callback number for any further questions.
70 - 99 MG/DL   CBC with Auto Differential    Collection Time: 03/19/23  6:27 PM   Result Value Ref Range    WBC 7.4 4.0 - 10.5 K/CU MM    RBC 2.55 (L) 4.6 - 6.2 M/CU MM    Hemoglobin 7.3 (L) 13.5 - 18.0 GM/DL    Hematocrit 24.8 (L) 42 - 52 %    MCV 97.3 78 - 100 FL    MCH 28.6 27 - 31 PG    MCHC 29.4 (L) 32.0 - 36.0 %    RDW 14.6 11.7 - 14.9 %    Platelets 024 154 - 434 K/CU MM    MPV 9.5 7.5 - 11.1 FL    Differential Type AUTOMATED DIFFERENTIAL     Segs Relative 56.5 36 - 66 %    Lymphocytes % 24.5 24 - 44 %    Monocytes % 14.7 (H) 0 - 4 %    Eosinophils % 3.8 (H) 0 - 3 %    Basophils % 0.4 0 - 1 %    Segs Absolute 4.2 K/CU MM    Lymphocytes Absolute 1.8 K/CU MM    Monocytes Absolute 1.1 K/CU MM    Eosinophils Absolute 0.3 K/CU MM    Basophils Absolute 0.0 K/CU MM    Nucleated RBC % 0.0 %    Total Nucleated RBC 0.0 K/CU MM    Total Immature Neutrophil 0.01 K/CU MM    Immature Neutrophil % 0.1 0 - 0.43 %   POCT Glucose    Collection Time: 03/19/23  8:25 PM   Result Value Ref Range    POC Glucose 157 (H) 70 - 99 MG/DL   Protime-INR    Collection Time: 03/20/23  3:43 AM   Result Value Ref Range    Protime 18.8 (H) 11.7 - 14.5 SECONDS    INR 1.45 INDEX   C-Reactive Protein    Collection Time: 03/20/23  3:43 AM   Result Value Ref Range    CRP High Sensitivity 9.7 (H) <5.0 mg/L   CBC with Auto Differential    Collection Time: 03/20/23  3:43 AM   Result Value Ref Range    WBC 7.5 4.0 - 10.5 K/CU MM    RBC 2.55 (L) 4.6 - 6.2 M/CU MM    Hemoglobin 7.2 (L) 13.5 - 18.0 GM/DL    Hematocrit 24.9 (L) 42 - 52 %    MCV 97.6 78 - 100 FL    MCH 28.2 27 - 31 PG    MCHC 28.9 (L) 32.0 - 36.0 %    RDW 14.6 11.7 - 14.9 %    Platelets 409 905 - 742 K/CU MM    MPV 9.5 7.5 - 11.1 FL    Differential Type AUTOMATED DIFFERENTIAL     Segs Relative 56.5 36 - 66 %    Lymphocytes % 25.1 24 - 44 %    Monocytes % 13.4 (H) 0 - 4 %    Eosinophils % 4.0 (H) 0 - 3 %    Basophils % 0.5 0 - 1 %    Segs Absolute 4.2 K/CU MM    Lymphocytes Absolute 1.9
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 is on hold due to hyperkalemia-he probably will need oral loop soon-review serology when available      Ricky Ron MD MD
20-22=20-39%(CJ), 15-19=40-59%(CK), 10-14=60-79%(CL), 7-9=80-99%(CM), 6=100%(CN)]     Treatment:    Self Care Training:   Cues were given for safety, sequence, UE/LE placement, visual cues, and balance. Activities performed today included toileting    Educated pt on role of OT, therapy POC and functional goals, progression w/ ADLs and transfers, importance of movement and OOB activity, d/c recommendations     Safety Measures: Gait belt used, Left in bed, Alarm in place    Assessment:  Pt is an 80 y o M admitted d/t UTI. Pt at baseline has assistance for ADLs, assistance for high level IADLs, and assistance for functional transfers/mobility w/ w/c. Pt currently presents w/ deficits in ADL and high level IADL independence, functional ADL transfers, strength, and functional activity tolerance. Continued OT services recommended to increase safety and independence with ADL routine and to address remaining functional deficits. Pt would benefit from continued acute care OT services w/ discharge to SNF. Complexity: Moderate  Prognosis: Good, no significant barriers to participation at this time. Occupational Therapy Plan  Times Per Week: 2-3         Goals:  Pt will complete all aspects of bed mobility for EOB/OOB ADLs w/ mod I. Pt will complete UB ADLs w/ min A. Pt will complete LB ADLs w/ mod A. Pt will complete all functional transfers to and from bed, chair, toilet, shower chair w/ CGA. Pt will complete all aspects of toileting task w/ mod A. Pt will perform therex/theract in order to increase strength and functional activity tolerance necessary for increased independence w/ ADL routine.     Pt goal: go home, get stronger  Time Frame for STGs: discharge    Equipment: Continue to assess at next LOC    Time:   Time in: 1500  Time out: 1517  Total time: 17  Timed treatment minutes: 8        Electronically signed by:      CIRILO Lopez/MIMI  [id number]
8:26 PM   Result Value Ref Range    POC Glucose 121 (H) 70 - 99 MG/DL   Lactic Acid    Collection Time: 03/17/23  9:03 PM   Result Value Ref Range    Lactate 0.9 0.5 - 1.9 mMOL/L   CBC    Collection Time: 03/17/23 11:47 PM   Result Value Ref Range    WBC 7.8 4.0 - 10.5 K/CU MM    RBC 2.91 (L) 4.6 - 6.2 M/CU MM    Hemoglobin 8.1 (L) 13.5 - 18.0 GM/DL    Hematocrit 28.4 (L) 42 - 52 %    MCV 97.6 78 - 100 FL    MCH 27.8 27 - 31 PG    MCHC 28.5 (L) 32.0 - 36.0 %    RDW 14.8 11.7 - 14.9 %    Platelets 579 049 - 750 K/CU MM    MPV 9.2 7.5 - 11.1 FL   TYPE AND SCREEN    Collection Time: 03/17/23 11:47 PM   Result Value Ref Range    ABO/Rh O POSITIVE     Antibody Screen NEGATIVE    Comprehensive Metabolic Panel w/ Reflex to MG    Collection Time: 03/18/23  2:43 AM   Result Value Ref Range    Sodium 140 135 - 145 MMOL/L    Potassium 5.4 (H) 3.5 - 5.1 MMOL/L    Chloride 113 (H) 99 - 110 mMol/L    CO2 22 21 - 32 MMOL/L    BUN 27 (H) 6 - 23 MG/DL    Creatinine 1.5 (H) 0.9 - 1.3 MG/DL    Est, Glom Filt Rate 45 (L) >60 mL/min/1.73m2    Glucose 108 (H) 70 - 99 MG/DL    Calcium 7.0 (L) 8.3 - 10.6 MG/DL    Albumin 3.0 (L) 3.4 - 5.0 GM/DL    Total Protein 5.3 (L) 6.4 - 8.2 GM/DL    Total Bilirubin 0.2 0.0 - 1.0 MG/DL    ALT 6 (L) 10 - 40 U/L    AST 13 (L) 15 - 37 IU/L    Alkaline Phosphatase 68 40 - 128 IU/L    Anion Gap 5 4 - 16   CBC with Auto Differential    Collection Time: 03/18/23  2:43 AM   Result Value Ref Range    WBC 7.4 4.0 - 10.5 K/CU MM    RBC 2.78 (L) 4.6 - 6.2 M/CU MM    Hemoglobin 7.7 (L) 13.5 - 18.0 GM/DL    Hematocrit 27.2 (L) 42 - 52 %    MCV 97.8 78 - 100 FL    MCH 27.7 27 - 31 PG    MCHC 28.3 (L) 32.0 - 36.0 %    RDW 14.9 11.7 - 14.9 %    Platelets 830 033 - 560 K/CU MM    MPV 9.7 7.5 - 11.1 FL    Differential Type AUTOMATED DIFFERENTIAL     Segs Relative 54.6 36 - 66 %    Lymphocytes % 30.0 24 - 44 %    Monocytes % 11.9 (H) 0 - 4 %    Eosinophils % 2.7 0 - 3 %    Basophils % 0.5 0 - 1 %    Segs Absolute 4.1 K/CU
2/25/2023 11:20 am COMPARISON: None. HISTORY: ORDERING SYSTEM PROVIDED HISTORY: JESSICA on CKDIII TECHNOLOGIST PROVIDED HISTORY: Reason for exam:->JESSICA on CKDIII FINDINGS: The right kidney measures 9.6 cm in length and the left kidney measures 9.6 cm in length. Kidneys demonstrate normal cortical echogenicity. No hydronephrosis or intrarenal stones. No focal lesions. No hydronephrosis. Assessment & Plan:      Diana Zamora is a 80 y.o. male admitted 3/16/2023 for UTI. 1) Gross hematuria: in the setting of UTI and warfarin use with supratherapeutic INR of 3.9 initially (goal 2-3)              Hgb 7.4, relatively stable but decreased from baseline around 9-10. Continue monitoring. INR 2.1 today. Warfarin temporarily held. Fecal occult blood also positive   2) UTI:  Recent urine cx 3/13/23 growing Alcaligenes faecalis ssp faecalis >100k cfu/mL resistant to Bactrim and gentamicin, Providencia rettgeri >100k cfu/mL with multiple drug resistances. Afebrile/VSS. WBC 7.7. On cefepime. 3) BPH: on terazosin and recently started Proscar              Cystoscopy 2/22/23: bilobar enlargement, may benefit from UroLift or TURP but high risk for TURP due to underlying cardiac issues (EF 20-25%, on warfarin for PAF). Patient stable from a  standpoint. Will sign off. Please call with any questions. Outpatient follow up in 1-2 weeks for re-evaluation and discussion for UroLift. Patient seen and examined, chart reviewed.      Electronically signed by JACINTO Becker on 3/19/2023 at 8:23 AM
K/CU MM    Eosinophils Absolute 0.3 K/CU MM    Basophils Absolute 0.0 K/CU MM    Nucleated RBC % 0.0 %    Total Nucleated RBC 0.0 K/CU MM    TRC 0.0467 K/CU MM    Total Immature Neutrophil 0.04 K/CU MM    Immature Neutrophil % 0.5 (H) 0 - 0.43 %   Comprehensive Metabolic Panel    Collection Time: 03/20/23  3:43 AM   Result Value Ref Range    Sodium 142 135 - 145 MMOL/L    Potassium 4.7 3.5 - 5.1 MMOL/L    Chloride 113 (H) 99 - 110 mMol/L    CO2 24 21 - 32 MMOL/L    BUN 24 (H) 6 - 23 MG/DL    Creatinine 1.5 (H) 0.9 - 1.3 MG/DL    Est, Glom Filt Rate 45 (L) >60 mL/min/1.73m2    Glucose 95 70 - 99 MG/DL    Calcium 7.2 (L) 8.3 - 10.6 MG/DL    Albumin 3.1 (L) 3.4 - 5.0 GM/DL    Total Protein 5.3 (L) 6.4 - 8.2 GM/DL    Total Bilirubin 0.2 0.0 - 1.0 MG/DL    ALT 6 (L) 10 - 40 U/L    AST 14 (L) 15 - 37 IU/L    Alkaline Phosphatase 71 40 - 128 IU/L    Anion Gap 5 4 - 16   POCT Glucose    Collection Time: 03/20/23  6:53 AM   Result Value Ref Range    POC Glucose 108 (H) 70 - 99 MG/DL        Imaging/Diagnostics Last 24 Hours   No results found.     Electronically signed by Tj Andre MD on 3/20/2023 at 10:49 AM

## 2023-03-20 NOTE — CONSULTS
Consult reviewed. Patient will need IV Cefepime q12h until 3/31/23 for UTI. Private message sent to Dr. Mar Hemphill regarding discharge planning as patient wants to go home, however family at bedside does not have resources available to provide IV antibiotics. Discharge plan now is to delay discharge until [place] [id number] is set up for patient. [place] [address] referral was completed. PC to weekend CM placed by primary RN Hayes Gallo, no answer. After updating family regarding discharge delay, patient requested to go to a SNF to receive IV antibiotics. Dr. Mar Hemphill notified, discharge plan updated, patient to collaborate with CM regarding discharge needs on Monday. Patient, patient's family and primary RN Hayes Gallo updated. Patient does not want midline at this time and requesting to be placed prior to discharging from hospital. Patient currently has PIV that meets therapeutic needs. Please consult IV/PICC team for questions, concerns, or patient's needs change.
Midline meets therapeutic needs at this time:  Cefipime through 3/31/23. [facility] Endurance Midline inserted in MURIEL Cephalic Vessel x 1 attempts using sterile ultrasound guided technique. Brisk blood return, flushes without resistance. OK to draw blood from midline.
3/17-  Past:  ?  -------------------------------------------------------------------------------------------------------------------    Vital Signs:  Vitals:    03/17/23 0835   BP: (!) 158/65   Pulse: 96   Resp:    Temp:    SpO2:          Exam:    VS: noted; wt 255 lb (115.7 kg) Height 6'4\"  Gen: alert and oriented X3, no distress  Skin: no stigmata of endocarditis  Wounds: C/D/I-NA  HEMT: AT/NC Oropharynx pink, moist, and without lesions or exudates; dentition in good state of repair, Legally blind  Eyes: PERRLA, EOMI, conjunctiva pink, sclera anicteric. Neck: Supple. Trachea midline. No LAD. Chest: no distress and CTA. Good air movement. Heart: RRR and no MRG. Abd: soft, non-distended, no tenderness, no hepatomegaly. Normoactive bowel sounds. Ext: no clubbing, cyanosis, or edema  Neuro: Mental status intact. CN 2-12 intact and no focal sensory or motor deficits    ? Diagnostic Studies: reviewed  ? ? I have examined this patient and available medical records on this date and have made the above observations, conclusions and recommendations. Electronically signed by: Electronically signed by Selina Bundy.  HILLARY Dinh CNP on 3/17/2023 at 10:26 AM
EXAM:  General appearance: Thin, blind, without any acute distress, alert awake and oriented  HEENT: No gross conjunctival pallor or scleral icterus  Neck: Supple with supplemental oxygen per nasal cannula  Heart: Seemed regular rate and rhythm  LUNGS: Coarse breath sound but few expiratory rhonchi  Abdomen: Soft, nontender  Extremities: Trace bilateral edema    LABS:  Reviewed, see in epic            IMPRESSION:  Chronic kidney disease stage G4-has risk for worsening function now  Hyperkalemia likely from recent Bactrim and Entresto  Severe cardiomyopathy along with multiple other comorbid disease    PLAN:    Start with UA and urinary indicis . I will definitely stop IV fluid and Entresto-start oral Lokelma until potassium goes less than 5-reviewed prior treatment-revisit the infectious process-also reviewed the cardiac work-up and etiology of his cardiomyopathy-he of course is high risk for pulmonary edema-meanwhile good blood pressure control-he should be an excellent candidate for sodium glucose co-transporter 2 inhibitors but his recurrent cystitis might be a contraindication-follow clinically and biochemically
hematuria    PHYSICAL EXAM:      VITALS:  /65   Pulse 85   Temp 97.8 °F (36.6 °C) (Oral)   Resp 17   Ht 6' 4\" (1.93 m)   Wt 255 lb (115.7 kg)   SpO2 99%   BMI 31.04 kg/m²      TEMPERATURE:  Current - Temp: 97.8 °F (36.6 °C); Max - Temp  Av.8 °F (36.6 °C)  Min: 97.5 °F (36.4 °C)  Max: 98.1 °F (36.7 °C)  24HR BLOOD PRESSURE RANGE:  Systolic (55DTR), HLD:104 , Min:121 , LZT:962   ; Diastolic (99FNI), BSX:39, Min:50, Max:80    8HR INTAKE OUTPUT:  No intake/output data recorded. URINARY CATHETER OUTPUT (Sandhu):     DRAIN/TUBE OUTPUT:        General appearance: alert, appears stated age, cooperative, and no distress  Head: Normocephalic, without obvious abnormality, atraumatic  Back:  No CVA tenderness  Abdomen:  Soft, non-tender, non-distended  : Normal uncirc phallus. Dried blood present. No scrotal abnormalities noted.     DATA:    WBC:    Lab Results   Component Value Date/Time    WBC 7.4 2023 02:43 AM     Hemoglobin/Hematocrit:    Lab Results   Component Value Date/Time    HGB 7.7 2023 02:43 AM    HCT 27.2 2023 02:43 AM     BMP:    Lab Results   Component Value Date/Time     2023 02:43 AM    K 5.4 2023 02:43 AM     2023 02:43 AM    CO2 22 2023 02:43 AM    BUN 27 2023 02:43 AM    LABALBU 3.0 2023 02:43 AM    CREATININE 1.5 2023 02:43 AM    CALCIUM 7.0 2023 02:43 AM    GFRAA 35 2022 11:24 AM    LABGLOM 45 2023 02:43 AM     PT/INR:    Lab Results   Component Value Date/Time    PROTIME 44.3 2023 02:43 AM    PROTIME 1.9 2022 09:59 AM    INR 3.39 2023 02:43 AM     Blood Culture: NGTD  Urine Culture: Alcaligenes faecalis ssp faecalis >100k cfu/mL  Providencia rettgeri >100k cfu/mL    Providencia rettgeri (2)  Antibiotic Interpretation Microscan Method Status    ampicillin Resistant >16 BACTERIAL SUSCEPTIBILITY PANEL ANA Final    ampicillin-sulbactam Resistant >16/8 BACTERIAL SUSCEPTIBILITY PANEL
Oxygen Concentrator       hydrALAZINE (APRESOLINE) 25 MG tablet Take 1 tablet by mouth 3 times daily (Patient taking differently: Take 25 mg by mouth daily) 90 tablet 0    terazosin (HYTRIN) 5 MG capsule Take 1 capsule by mouth nightly 30 capsule 3    warfarin (COUMADIN) 5 MG tablet Take 1 tablet by mouth daily 30 tablet 3     Review of Systems:   Review of systems is not possible because patient is sleeping. Per nursing staff he was up all night and has just slept. Physical Examination:    Vitals:    03/17/23 1035   BP: (!) 181/80   Pulse: 90   Resp: 22   Temp: 98 °F (36.7 °C)   SpO2: 97%        General Appearance:  No distress, sleeping comfortably  Constitutional:  No acute distress, Non-toxic appearance. HENT:  Normocephalic, Atraumatic,   Eyes:  PERRL, EOMI, Conjunctiva normal, No discharge. Respiratory:  No respiratory distress, No wheezing  Cardiovascular: S1, S2, no murmurs, gallops. JVD wnl  Abdomen /GI:   Soft, No tenderness   Genitourinary: No costovertebral angle tenderness   Musculoskeletal:  No edema, no tenderness, no deformities. Integument:  Well hydrated, no rash   Neurologic: Sleeping comfortably    Medical decision making and Data review:    Lab Review   Recent Labs     03/16/23  2340   WBC 7.1   HGB 8.5*   HCT 29.2*         Recent Labs     03/16/23  2340      K 5.6*   *   CO2 22   BUN 41*   CREATININE 1.9*     Recent Labs     03/16/23  2340   AST 11*   ALT 7*   BILITOT 0.2   ALKPHOS 83     No results for input(s): TROPONINT in the last 72 hours. No results for input(s): PROBNP in the last 72 hours. Lab Results   Component Value Date    INR 3.89 03/17/2023    PROTIME 50.9 (H) 03/17/2023       EKG: (reviewed by myself): Sinus rhythm with left bundle branch block    ECHO:(reviewed by myself) his last echo from February 2023 showed EF of 20%.         All labs, medications and tests reviewed by myself including data  from outside source , patient and available family

## 2023-03-21 LAB
C3 SERPL-MCNC: 99 MG/DL (ref 90–180)
C4 SERPL-MCNC: 31 MG/DL (ref 10–40)
KAPPA LC FREE SER-MCNC: 73.89 MG/L (ref 3.3–19.4)
KAPPA LC FREE/LAMBDA FREE SER NEPH: 1.51 {RATIO} (ref 0.26–1.65)
LAMBDA LC FREE SERPL-MCNC: 48.85 MG/L (ref 5.71–26.3)
MYELOPEROXIDASE AB SER-ACNC: 0 AU/ML (ref 0–19)
PROTEINASE3 AB SER-ACNC: 0 AU/ML (ref 0–19)

## 2023-03-22 LAB
ALBUMIN SERPL ELPH-MCNC: 2.7 GM/DL (ref 3.2–5.6)
ALPHA-1-GLOBULIN: 0.2 GM/DL (ref 0.1–0.4)
ALPHA-2-GLOBULIN: 0.7 GM/DL (ref 0.4–1.2)
BETA GLOBULIN: 0.8 GM/DL (ref 0.5–1.3)
CULTURE: NORMAL
CULTURE: NORMAL
GAMMA GLOBULIN: 0.9 GM/DL (ref 0.5–1.6)
Lab: NORMAL
SPECIMEN: NORMAL
SPECIMEN: NORMAL
SPEP INTERPRETATION: ABNORMAL
TEST NAME: NORMAL
TOTAL PROTEIN: 5.3 GM/DL (ref 6.4–8.2)

## 2023-03-24 ENCOUNTER — HOSPITAL ENCOUNTER (OUTPATIENT)
Age: 88
Setting detail: SPECIMEN
Discharge: HOME | End: 2023-03-24

## 2023-03-24 LAB
ALBUMIN SERPL-MCNC: 3 GM/DL (ref 3.4–5)
ALP BLD-CCNC: 72 IU/L (ref 40–128)
ALT SERPL-CCNC: 8 U/L (ref 10–40)
ANION GAP SERPL CALCULATED.3IONS-SCNC: 6 MMOL/L (ref 4–16)
AST SERPL-CCNC: 15 IU/L (ref 15–37)
BILIRUB SERPL-MCNC: 0.2 MG/DL (ref 0–1)
BUN SERPL-MCNC: 20 MG/DL (ref 6–23)
CALCIUM SERPL-MCNC: 7.5 MG/DL (ref 8.3–10.6)
CHLORIDE BLD-SCNC: 112 MMOL/L (ref 99–110)
CO2: 25 MMOL/L (ref 21–32)
CREAT SERPL-MCNC: 1.6 MG/DL (ref 0.9–1.3)
GFR SERPL CREATININE-BSD FRML MDRD: 41 ML/MIN/1.73M2
GLUCOSE SERPL-MCNC: 102 MG/DL (ref 70–99)
HCT VFR BLD CALC: 23.9 % (ref 42–52)
HEMOGLOBIN: 6.8 GM/DL (ref 13.5–18)
MCH RBC QN AUTO: 27.6 PG (ref 27–31)
MCHC RBC AUTO-ENTMCNC: 28.5 % (ref 32–36)
MCV RBC AUTO: 97.2 FL (ref 78–100)
PDW BLD-RTO: 14.8 % (ref 11.7–14.9)
PLATELET # BLD: 167 K/CU MM (ref 140–440)
PMV BLD AUTO: 10.1 FL (ref 7.5–11.1)
POTASSIUM SERPL-SCNC: 4.4 MMOL/L (ref 3.5–5.1)
RBC # BLD: 2.46 M/CU MM (ref 4.6–6.2)
SODIUM BLD-SCNC: 143 MMOL/L (ref 135–145)
TOTAL PROTEIN: 5.2 GM/DL (ref 6.4–8.2)
WBC # BLD: 8.8 K/CU MM (ref 4–10.5)

## 2023-03-24 PROCEDURE — 80053 COMPREHEN METABOLIC PANEL: CPT

## 2023-03-24 PROCEDURE — 85027 COMPLETE CBC AUTOMATED: CPT

## 2023-03-24 PROCEDURE — 36415 COLL VENOUS BLD VENIPUNCTURE: CPT

## 2023-03-27 ENCOUNTER — HOSPITAL ENCOUNTER (OUTPATIENT)
Age: 88
Setting detail: SPECIMEN
Discharge: HOME | End: 2023-03-27

## 2023-03-27 LAB
ALBUMIN SERPL-MCNC: 3.1 GM/DL (ref 3.4–5)
ALP BLD-CCNC: 75 IU/L (ref 40–128)
ALT SERPL-CCNC: 7 U/L (ref 10–40)
ANION GAP SERPL CALCULATED.3IONS-SCNC: 4 MMOL/L (ref 4–16)
AST SERPL-CCNC: 14 IU/L (ref 15–37)
BILIRUB SERPL-MCNC: 0.2 MG/DL (ref 0–1)
BUN SERPL-MCNC: 19 MG/DL (ref 6–23)
CALCIUM SERPL-MCNC: 7.8 MG/DL (ref 8.3–10.6)
CHLORIDE BLD-SCNC: 110 MMOL/L (ref 99–110)
CO2: 28 MMOL/L (ref 21–32)
CREAT SERPL-MCNC: 1.6 MG/DL (ref 0.9–1.3)
GFR SERPL CREATININE-BSD FRML MDRD: 41 ML/MIN/1.73M2
GLUCOSE SERPL-MCNC: 114 MG/DL (ref 70–99)
HCT VFR BLD CALC: 26.1 % (ref 42–52)
HEMOGLOBIN: 7.5 GM/DL (ref 13.5–18)
MCH RBC QN AUTO: 28.1 PG (ref 27–31)
MCHC RBC AUTO-ENTMCNC: 28.7 % (ref 32–36)
MCV RBC AUTO: 97.8 FL (ref 78–100)
PDW BLD-RTO: 15.5 % (ref 11.7–14.9)
PLATELET # BLD: 165 K/CU MM (ref 140–440)
PMV BLD AUTO: 9.3 FL (ref 7.5–11.1)
POTASSIUM SERPL-SCNC: 4.7 MMOL/L (ref 3.5–5.1)
RBC # BLD: 2.67 M/CU MM (ref 4.6–6.2)
SODIUM BLD-SCNC: 142 MMOL/L (ref 135–145)
TOTAL PROTEIN: 5.4 GM/DL (ref 6.4–8.2)
WBC # BLD: 7.4 K/CU MM (ref 4–10.5)

## 2023-03-27 PROCEDURE — 9900360100 HC STAT COLLECTION FEE SNF

## 2023-03-27 PROCEDURE — 36415 COLL VENOUS BLD VENIPUNCTURE: CPT

## 2023-03-27 PROCEDURE — 85027 COMPLETE CBC AUTOMATED: CPT

## 2023-03-27 PROCEDURE — 80053 COMPREHEN METABOLIC PANEL: CPT

## 2023-03-31 ENCOUNTER — HOSPITAL ENCOUNTER (OUTPATIENT)
Age: 88
Setting detail: SPECIMEN
Discharge: HOME | End: 2023-03-31

## 2023-03-31 LAB
ALBUMIN SERPL-MCNC: 3.4 GM/DL (ref 3.4–5)
ALP BLD-CCNC: 83 IU/L (ref 40–128)
ALT SERPL-CCNC: 10 U/L (ref 10–40)
ANION GAP SERPL CALCULATED.3IONS-SCNC: 7 MMOL/L (ref 4–16)
AST SERPL-CCNC: 19 IU/L (ref 15–37)
BILIRUB SERPL-MCNC: 0.2 MG/DL (ref 0–1)
BUN SERPL-MCNC: 20 MG/DL (ref 6–23)
CALCIUM SERPL-MCNC: 8.2 MG/DL (ref 8.3–10.6)
CHLORIDE BLD-SCNC: 108 MMOL/L (ref 99–110)
CO2: 27 MMOL/L (ref 21–32)
CREAT SERPL-MCNC: 1.7 MG/DL (ref 0.9–1.3)
GFR SERPL CREATININE-BSD FRML MDRD: 39 ML/MIN/1.73M2
GLUCOSE SERPL-MCNC: 92 MG/DL (ref 70–99)
HCT VFR BLD CALC: 28.2 % (ref 42–52)
HEMOGLOBIN: 8.2 GM/DL (ref 13.5–18)
MCH RBC QN AUTO: 28.2 PG (ref 27–31)
MCHC RBC AUTO-ENTMCNC: 29.1 % (ref 32–36)
MCV RBC AUTO: 96.9 FL (ref 78–100)
PDW BLD-RTO: 15.2 % (ref 11.7–14.9)
PLATELET # BLD: 171 K/CU MM (ref 140–440)
PMV BLD AUTO: 10 FL (ref 7.5–11.1)
POTASSIUM SERPL-SCNC: 4.5 MMOL/L (ref 3.5–5.1)
RBC # BLD: 2.91 M/CU MM (ref 4.6–6.2)
SODIUM BLD-SCNC: 142 MMOL/L (ref 135–145)
TOTAL PROTEIN: 5.9 GM/DL (ref 6.4–8.2)
WBC # BLD: 7.6 K/CU MM (ref 4–10.5)

## 2023-03-31 PROCEDURE — 80053 COMPREHEN METABOLIC PANEL: CPT

## 2023-03-31 PROCEDURE — 36415 COLL VENOUS BLD VENIPUNCTURE: CPT

## 2023-03-31 PROCEDURE — 85027 COMPLETE CBC AUTOMATED: CPT

## 2023-04-03 ENCOUNTER — HOSPITAL ENCOUNTER (OUTPATIENT)
Age: 88
Setting detail: SPECIMEN
Discharge: HOME | End: 2023-04-03

## 2023-04-03 LAB
INR BLD: 1.41 INDEX
PROTHROMBIN TIME: 17.9 SECONDS (ref 11.7–14.5)

## 2023-04-03 PROCEDURE — 36415 COLL VENOUS BLD VENIPUNCTURE: CPT

## 2023-04-03 PROCEDURE — 85610 PROTHROMBIN TIME: CPT

## 2023-04-05 ENCOUNTER — HOSPITAL ENCOUNTER (OUTPATIENT)
Age: 88
Setting detail: SPECIMEN
Discharge: HOME | End: 2023-04-05
Payer: MEDICARE

## 2023-04-05 PROCEDURE — 87186 SC STD MICRODIL/AGAR DIL: CPT

## 2023-04-05 PROCEDURE — 87077 CULTURE AEROBIC IDENTIFY: CPT

## 2023-04-05 PROCEDURE — 87086 URINE CULTURE/COLONY COUNT: CPT

## 2023-04-05 PROCEDURE — 81003 URINALYSIS AUTO W/O SCOPE: CPT

## 2023-04-06 LAB
BILIRUBIN URINE: NEGATIVE MG/DL
BLOOD, URINE: NEGATIVE
CLARITY: CLEAR
COLOR: YELLOW
GLUCOSE, URINE: NEGATIVE MG/DL
KETONES, URINE: NEGATIVE MG/DL
LEUKOCYTE ESTERASE, URINE: NEGATIVE
NITRITE URINE, QUANTITATIVE: NEGATIVE
PH, URINE: 5.5 (ref 5–8)
PROTEIN UA: 100 MG/DL
SPECIFIC GRAVITY UA: 1.02 (ref 1–1.03)
UROBILINOGEN, URINE: 0.2 MG/DL (ref 0.2–1)

## 2023-04-07 ENCOUNTER — HOSPITAL ENCOUNTER (OUTPATIENT)
Age: 88
Setting detail: SPECIMEN
Discharge: HOME | End: 2023-04-07

## 2023-04-07 LAB
ALBUMIN SERPL-MCNC: 3.4 GM/DL (ref 3.4–5)
ALP BLD-CCNC: 74 IU/L (ref 40–129)
ALT SERPL-CCNC: 6 U/L (ref 10–40)
ANION GAP SERPL CALCULATED.3IONS-SCNC: 7 MMOL/L (ref 4–16)
AST SERPL-CCNC: 14 IU/L (ref 15–37)
BILIRUB SERPL-MCNC: 0.3 MG/DL (ref 0–1)
BUN SERPL-MCNC: 24 MG/DL (ref 6–23)
CALCIUM SERPL-MCNC: 7.4 MG/DL (ref 8.3–10.6)
CHLORIDE BLD-SCNC: 107 MMOL/L (ref 99–110)
CO2: 30 MMOL/L (ref 21–32)
CREAT SERPL-MCNC: 1.5 MG/DL (ref 0.9–1.3)
CULTURE: ABNORMAL
CULTURE: ABNORMAL
GFR SERPL CREATININE-BSD FRML MDRD: 45 ML/MIN/1.73M2
GLUCOSE SERPL-MCNC: 95 MG/DL (ref 70–99)
HCT VFR BLD CALC: 28.9 % (ref 42–52)
HEMOGLOBIN: 8.3 GM/DL (ref 13.5–18)
Lab: ABNORMAL
MCH RBC QN AUTO: 27.9 PG (ref 27–31)
MCHC RBC AUTO-ENTMCNC: 28.7 % (ref 32–36)
MCV RBC AUTO: 97.3 FL (ref 78–100)
PDW BLD-RTO: 14.8 % (ref 11.7–14.9)
PLATELET # BLD: 164 K/CU MM (ref 140–440)
PMV BLD AUTO: 10.2 FL (ref 7.5–11.1)
POTASSIUM SERPL-SCNC: 4.3 MMOL/L (ref 3.5–5.1)
RBC # BLD: 2.97 M/CU MM (ref 4.6–6.2)
SODIUM BLD-SCNC: 144 MMOL/L (ref 135–145)
SPECIMEN: ABNORMAL
TOTAL PROTEIN: 5.5 GM/DL (ref 6.4–8.2)
WBC # BLD: 7 K/CU MM (ref 4–10.5)

## 2023-04-07 PROCEDURE — 80053 COMPREHEN METABOLIC PANEL: CPT

## 2023-04-07 PROCEDURE — 85027 COMPLETE CBC AUTOMATED: CPT

## 2023-04-13 ENCOUNTER — APPOINTMENT (OUTPATIENT)
Dept: CT IMAGING | Age: 88
End: 2023-04-13
Payer: MEDICARE

## 2023-04-13 ENCOUNTER — HOSPITAL ENCOUNTER (EMERGENCY)
Age: 88
Discharge: HOME | End: 2023-04-13
Attending: EMERGENCY MEDICINE
Payer: MEDICARE

## 2023-04-13 VITALS
BODY MASS INDEX: 26.18 KG/M2 | RESPIRATION RATE: 13 BRPM | TEMPERATURE: 97.9 F | WEIGHT: 215 LBS | HEIGHT: 76 IN | DIASTOLIC BLOOD PRESSURE: 87 MMHG | OXYGEN SATURATION: 100 % | SYSTOLIC BLOOD PRESSURE: 170 MMHG | HEART RATE: 80 BPM

## 2023-04-13 DIAGNOSIS — K59.00 CONSTIPATION, UNSPECIFIED CONSTIPATION TYPE: Primary | ICD-10-CM

## 2023-04-13 DIAGNOSIS — R10.9 ABDOMINAL PAIN, UNSPECIFIED ABDOMINAL LOCATION: ICD-10-CM

## 2023-04-13 LAB
ALBUMIN SERPL-MCNC: 3.7 GM/DL (ref 3.4–5)
ALP BLD-CCNC: 81 IU/L (ref 40–129)
ALT SERPL-CCNC: 7 U/L (ref 10–40)
ANION GAP SERPL CALCULATED.3IONS-SCNC: 8 MMOL/L (ref 4–16)
AST SERPL-CCNC: 16 IU/L (ref 15–37)
BASOPHILS ABSOLUTE: 0.1 K/CU MM
BASOPHILS RELATIVE PERCENT: 0.7 % (ref 0–1)
BILIRUB SERPL-MCNC: 0.4 MG/DL (ref 0–1)
BUN SERPL-MCNC: 18 MG/DL (ref 6–23)
CALCIUM SERPL-MCNC: 7.6 MG/DL (ref 8.3–10.6)
CHLORIDE BLD-SCNC: 104 MMOL/L (ref 99–110)
CO2: 29 MMOL/L (ref 21–32)
CREAT SERPL-MCNC: 1.4 MG/DL (ref 0.9–1.3)
DIFFERENTIAL TYPE: ABNORMAL
EOSINOPHILS ABSOLUTE: 0.3 K/CU MM
EOSINOPHILS RELATIVE PERCENT: 3.8 % (ref 0–3)
GFR SERPL CREATININE-BSD FRML MDRD: 49 ML/MIN/1.73M2
GLUCOSE SERPL-MCNC: 112 MG/DL (ref 70–99)
HCT VFR BLD CALC: 34.8 % (ref 42–52)
HEMOGLOBIN: 9.8 GM/DL (ref 13.5–18)
IMMATURE NEUTROPHIL %: 0.4 % (ref 0–0.43)
LACTATE: 1.2 MMOL/L (ref 0.5–1.9)
LIPASE: 31 IU/L (ref 13–60)
LYMPHOCYTES ABSOLUTE: 2.3 K/CU MM
LYMPHOCYTES RELATIVE PERCENT: 29.8 % (ref 24–44)
MCH RBC QN AUTO: 27.6 PG (ref 27–31)
MCHC RBC AUTO-ENTMCNC: 28.2 % (ref 32–36)
MCV RBC AUTO: 98 FL (ref 78–100)
MONOCYTES ABSOLUTE: 0.8 K/CU MM
MONOCYTES RELATIVE PERCENT: 10.9 % (ref 0–4)
NUCLEATED RBC %: 0 %
PDW BLD-RTO: 14.4 % (ref 11.7–14.9)
PLATELET # BLD: 166 K/CU MM (ref 140–440)
PMV BLD AUTO: 10 FL (ref 7.5–11.1)
POTASSIUM SERPL-SCNC: 5.1 MMOL/L (ref 3.5–5.1)
RBC # BLD: 3.55 M/CU MM (ref 4.6–6.2)
SEGMENTED NEUTROPHILS ABSOLUTE COUNT: 4.1 K/CU MM
SEGMENTED NEUTROPHILS RELATIVE PERCENT: 54.4 % (ref 36–66)
SODIUM BLD-SCNC: 141 MMOL/L (ref 135–145)
TOTAL IMMATURE NEUTOROPHIL: 0.03 K/CU MM
TOTAL NUCLEATED RBC: 0 K/CU MM
TOTAL PROTEIN: 6.8 GM/DL (ref 6.4–8.2)
WBC # BLD: 7.6 K/CU MM (ref 4–10.5)

## 2023-04-13 PROCEDURE — 74177 CT ABD & PELVIS W/CONTRAST: CPT

## 2023-04-13 PROCEDURE — 6370000000 HC RX 637 (ALT 250 FOR IP): Performed by: EMERGENCY MEDICINE

## 2023-04-13 PROCEDURE — 80053 COMPREHEN METABOLIC PANEL: CPT

## 2023-04-13 PROCEDURE — 99285 EMERGENCY DEPT VISIT HI MDM: CPT | Performed by: EMERGENCY MEDICINE

## 2023-04-13 PROCEDURE — 6360000004 HC RX CONTRAST MEDICATION: Performed by: EMERGENCY MEDICINE

## 2023-04-13 PROCEDURE — 83690 ASSAY OF LIPASE: CPT

## 2023-04-13 PROCEDURE — 83605 ASSAY OF LACTIC ACID: CPT

## 2023-04-13 PROCEDURE — 85025 COMPLETE CBC W/AUTO DIFF WBC: CPT

## 2023-04-13 RX ORDER — ONDANSETRON 2 MG/ML
4 INJECTION INTRAMUSCULAR; INTRAVENOUS EVERY 30 MIN PRN
Status: DISCONTINUED | OUTPATIENT
Start: 2023-04-13 | End: 2023-04-13 | Stop reason: HOSPADM

## 2023-04-13 RX ORDER — BISACODYL 10 MG
10 SUPPOSITORY, RECTAL RECTAL ONCE
Status: DISCONTINUED | OUTPATIENT
Start: 2023-04-13 | End: 2023-04-13 | Stop reason: HOSPADM

## 2023-04-13 RX ORDER — POLYETHYLENE GLYCOL 3350 17 G/17G
17 POWDER, FOR SOLUTION ORAL DAILY
Status: DISCONTINUED | OUTPATIENT
Start: 2023-04-13 | End: 2023-04-13 | Stop reason: HOSPADM

## 2023-04-13 RX ADMIN — POLYETHYLENE GLYCOL 3350 17 G: 17 POWDER, FOR SOLUTION ORAL at 13:54

## 2023-04-13 RX ADMIN — IOPAMIDOL 75 ML: 755 INJECTION, SOLUTION INTRAVENOUS at 10:50

## 2023-04-13 ASSESSMENT — ENCOUNTER SYMPTOMS
ABDOMINAL PAIN: 1
RESPIRATORY NEGATIVE: 1
ALLERGIC/IMMUNOLOGIC NEGATIVE: 1
EYES NEGATIVE: 1
CONSTIPATION: 1

## 2023-04-13 NOTE — ED NOTES
Report given to Tarsha. Pt transported back to [facility].       Maikol Shepherd RN  04/13/23 2022

## 2023-04-13 NOTE — ED TRIAGE NOTES
Pt arrived to the ED with complaints of constipation x 1 week. Pt complaints of abdominal pain as well.

## 2023-04-13 NOTE — ED NOTES
Pt had a large bowel movement and states he feels relief. Pt cleaned up and new brief placed on. Primary RN aware.       Madina Meredith RN  04/13/23 [id number]

## 2023-04-13 NOTE — ED PROVIDER NOTES
Tobacco Use    Smoking status: Never    Smokeless tobacco: Never   Vaping Use    Vaping Use: Never used   Substance and Sexual Activity    Alcohol use: Never    Drug use: Never    Sexual activity: Not on file   Other Topics Concern    Not on file   Social History Narrative    Not on file     Social Determinants of Health     Financial Resource Strain: Not on file   Food Insecurity: Not on file   Transportation Needs: Not on file   Physical Activity: Not on file   Stress: Not on file   Social Connections: Not on file   Intimate Partner Violence: Not on file   Housing Stability: Not on file     No current facility-administered medications for this encounter.      Current Outpatient Medications   Medication Sig Dispense Refill    cyanocobalamin 1000 MCG/ML injection Inject 1 mL into the muscle once for 1 dose 1 mL 0    ferrous sulfate (IRON 325) 325 (65 Fe) MG tablet Take 1 tablet by mouth daily (with breakfast) 30 tablet 3    folic acid (FOLVITE) 1 MG tablet Take 1 tablet by mouth daily 30 tablet 3    diphenhydrAMINE (BENADRYL) 25 MG tablet Take 25 mg by mouth nightly      famotidine (PEPCID) 40 MG tablet Take 40 mg by mouth daily      finasteride (PROSCAR) 5 MG tablet Take 5 mg by mouth daily      amLODIPine (NORVASC) 5 MG tablet Take 5 mg by mouth daily      sacubitril-valsartan (ENTRESTO) 49-51 MG per tablet Take 1 tablet by mouth 2 times daily 60 tablet 3    carvedilol (COREG) 12.5 MG tablet Take 1 tablet by mouth 2 times daily (with meals) 90 tablet 4    Multiple Vitamins-Minerals (PRESERVISION AREDS 2 PO) Take 2 capsules by mouth daily      docusate sodium (COLACE) 100 MG capsule Take 100 mg by mouth daily      latanoprost (XALATAN) 0.005 % ophthalmic solution Place 1 drop into the right eye nightly      isosorbide dinitrate (ISORDIL) 30 MG tablet Take 30 mg by mouth daily      ondansetron (ZOFRAN) 4 MG tablet Take 4 mg by mouth every 8 hours as needed for Nausea or Vomiting      Oxygen Concentrator

## 2023-04-13 NOTE — ED NOTES
This nurse attempted to call report to [facility] with no answer. Left voicemail at this time.       Angy Beaver RN  04/13/23 3789

## 2023-04-13 NOTE — CARE COORDINATION
Pt noted for possible readmission. Pt was recently admitted 3/16-3/20/23 for a UTI. Pt is currently at [facility]. Pt is previously from home with daughter and son-in-law. Pt has insurance and PCP. Pt DME walker, BSC and w/c. Pt returns today from [facility] for constipation. Pt was evaluated and will be d/c back to [facility].

## 2023-04-16 PROBLEM — N39.0 UTI (URINARY TRACT INFECTION): Status: RESOLVED | Noted: 2023-03-17 | Resolved: 2023-04-16

## 2023-04-18 ENCOUNTER — HOSPITAL ENCOUNTER (OUTPATIENT)
Age: 88
Setting detail: SPECIMEN
Discharge: HOME | End: 2023-04-18

## 2023-04-18 LAB
ALBUMIN SERPL-MCNC: 3.3 GM/DL (ref 3.4–5)
ALP BLD-CCNC: 72 IU/L (ref 40–128)
ALT SERPL-CCNC: 6 U/L (ref 10–40)
ANION GAP SERPL CALCULATED.3IONS-SCNC: 7 MMOL/L (ref 4–16)
AST SERPL-CCNC: 14 IU/L (ref 15–37)
BILIRUB SERPL-MCNC: 0.2 MG/DL (ref 0–1)
BUN SERPL-MCNC: 16 MG/DL (ref 6–23)
CALCIUM SERPL-MCNC: 7.6 MG/DL (ref 8.3–10.6)
CHLORIDE BLD-SCNC: 106 MMOL/L (ref 99–110)
CO2: 29 MMOL/L (ref 21–32)
CREAT SERPL-MCNC: 1.4 MG/DL (ref 0.9–1.3)
GFR SERPL CREATININE-BSD FRML MDRD: 49 ML/MIN/1.73M2
GLUCOSE SERPL-MCNC: 88 MG/DL (ref 70–99)
HCT VFR BLD CALC: 30.2 % (ref 42–52)
HEMOGLOBIN: 8.7 GM/DL (ref 13.5–18)
MCH RBC QN AUTO: 27.8 PG (ref 27–31)
MCHC RBC AUTO-ENTMCNC: 28.8 % (ref 32–36)
MCV RBC AUTO: 96.5 FL (ref 78–100)
PDW BLD-RTO: 14.2 % (ref 11.7–14.9)
PLATELET # BLD: 151 K/CU MM (ref 140–440)
PMV BLD AUTO: 10.1 FL (ref 7.5–11.1)
POTASSIUM SERPL-SCNC: 4.3 MMOL/L (ref 3.5–5.1)
RBC # BLD: 3.13 M/CU MM (ref 4.6–6.2)
SODIUM BLD-SCNC: 142 MMOL/L (ref 135–145)
TOTAL PROTEIN: 5.5 GM/DL (ref 6.4–8.2)
WBC # BLD: 6.9 K/CU MM (ref 4–10.5)

## 2023-04-18 PROCEDURE — 36415 COLL VENOUS BLD VENIPUNCTURE: CPT

## 2023-04-18 PROCEDURE — 85027 COMPLETE CBC AUTOMATED: CPT

## 2023-04-18 PROCEDURE — 80053 COMPREHEN METABOLIC PANEL: CPT

## 2023-04-21 ENCOUNTER — HOSPITAL ENCOUNTER (OUTPATIENT)
Age: 88
Setting detail: SPECIMEN
Discharge: HOME | End: 2023-04-21

## 2023-04-26 NOTE — TELEPHONE ENCOUNTER
Patient needs samples Entresto 49/51 cost   Has gone up to $ 500 his   Is working on helping him get the cost lowered

## 2023-05-01 ENCOUNTER — OFFICE VISIT (OUTPATIENT)
Dept: CARDIOLOGY CLINIC | Age: 88
End: 2023-05-01
Payer: MEDICARE

## 2023-05-01 VITALS
BODY MASS INDEX: 26.17 KG/M2 | HEART RATE: 91 BPM | DIASTOLIC BLOOD PRESSURE: 46 MMHG | SYSTOLIC BLOOD PRESSURE: 72 MMHG | HEIGHT: 76 IN | OXYGEN SATURATION: 94 %

## 2023-05-01 DIAGNOSIS — I50.22 CHRONIC SYSTOLIC (CONGESTIVE) HEART FAILURE (HCC): ICD-10-CM

## 2023-05-01 DIAGNOSIS — I44.7 LBBB (LEFT BUNDLE BRANCH BLOCK): ICD-10-CM

## 2023-05-01 DIAGNOSIS — I48.0 PAROXYSMAL ATRIAL FIBRILLATION (HCC): Primary | ICD-10-CM

## 2023-05-01 DIAGNOSIS — I25.5 ISCHEMIC CARDIOMYOPATHY: ICD-10-CM

## 2023-05-01 PROCEDURE — G8417 CALC BMI ABV UP PARAM F/U: HCPCS | Performed by: NURSE PRACTITIONER

## 2023-05-01 PROCEDURE — 1123F ACP DISCUSS/DSCN MKR DOCD: CPT | Performed by: NURSE PRACTITIONER

## 2023-05-01 PROCEDURE — 93000 ELECTROCARDIOGRAM COMPLETE: CPT | Performed by: NURSE PRACTITIONER

## 2023-05-01 PROCEDURE — 99214 OFFICE O/P EST MOD 30 MIN: CPT | Performed by: NURSE PRACTITIONER

## 2023-05-01 PROCEDURE — 1036F TOBACCO NON-USER: CPT | Performed by: NURSE PRACTITIONER

## 2023-05-01 PROCEDURE — G8427 DOCREV CUR MEDS BY ELIG CLIN: HCPCS | Performed by: NURSE PRACTITIONER

## 2023-05-01 RX ORDER — FUROSEMIDE 40 MG/1
40 TABLET ORAL DAILY
COMMUNITY
Start: 2023-04-26

## 2023-05-01 NOTE — PROGRESS NOTES
CARDIOLOGY  NOTE    2023    Mary Joy (:  1935) is a 80 y.o. Gregary Push established patient with Dr. Kenny Hobson, here for evaluation of the following chief complaint(s):  Follow-up, Chest Pain (A little bit of CP after taking medication (hurting feeling), pt states not severe. 3/10 pain level. Doesn't last too long. ), Dizziness (From time to time dedra on exertion), Shortness of Breath (On exertion), and Edema (Both legs, ankles and feet sometimes)        SUBJECTIVE/OBJECTIVE:    HPI  Ronel Donaldson has Past medical history as noted below. Very pleasant gentleman comes in with his daughter and son-in-law he is just moved to [facility] from [place]. He is wheelchair-bound mostly because he is legally blind. He is here for cardiomyopathy with ejection  fraction of 25% he did see nephrology as well currently not on ACE or an ARB because of creatinine of 2.1  But started on entresto after getting cleared from nephrology    Is alternating between 40 and 80 mg of Lasix that is helped a lot with his ankle swelling breathing is improved    Reports that he has been Sleeping in lounge chair, once in awhile gest chets pain, belching helps . His echo shows severe daily reduced EF of about 25% with wall motion abnormality stress test shows large anterior septal defect with no ischemia   EKG shows left bundle branch block. Hehas history of A. fib he is on Coumadin but really had echo and stress test in [place] sometime back and used to see cardiology over there . Have history of chronic kidney disease with creatinine 2.2 range.         Review of Systems    Vitals:    23 1032 23 1037   BP: (!) 78/48 (!) 72/46   Site: Right Upper Arm Left Upper Arm   Position: Sitting Sitting   Cuff Size: Medium Adult Medium Adult   Pulse: 91    SpO2: 94%    Height: 6' 4\" (1.93 m)        Wt Readings from Last 3 Encounters:   23 215 lb (97.5 kg)   23 255 lb (115.7 kg)   23 255 lb 14.4 oz (116.1 kg)       BP

## 2023-05-08 ENCOUNTER — OFFICE VISIT (OUTPATIENT)
Dept: CARDIOLOGY CLINIC | Age: 88
End: 2023-05-08
Payer: MEDICARE

## 2023-05-08 ENCOUNTER — HOSPITAL ENCOUNTER (OUTPATIENT)
Age: 88
Setting detail: SPECIMEN
Discharge: HOME | End: 2023-05-08
Payer: MEDICARE

## 2023-05-08 VITALS
DIASTOLIC BLOOD PRESSURE: 68 MMHG | HEIGHT: 76 IN | BODY MASS INDEX: 26.06 KG/M2 | HEART RATE: 72 BPM | WEIGHT: 214 LBS | SYSTOLIC BLOOD PRESSURE: 128 MMHG | OXYGEN SATURATION: 96 %

## 2023-05-08 DIAGNOSIS — I50.33 ACUTE ON CHRONIC DIASTOLIC CONGESTIVE HEART FAILURE (HCC): Primary | ICD-10-CM

## 2023-05-08 LAB
BACTERIA: ABNORMAL /HPF
BILIRUBIN URINE: NEGATIVE MG/DL
BLOOD, URINE: NEGATIVE
CLARITY: CLEAR
COLOR: YELLOW
GLUCOSE, URINE: NEGATIVE MG/DL
HYALINE CASTS: 2 /LPF
KETONES, URINE: NEGATIVE MG/DL
LEUKOCYTE ESTERASE, URINE: ABNORMAL
MUCUS: ABNORMAL HPF
NITRITE URINE, QUANTITATIVE: NEGATIVE
PH, URINE: 6.5 (ref 5–8)
PROTEIN UA: NEGATIVE MG/DL
RBC URINE: 1 /HPF (ref 0–3)
SPECIFIC GRAVITY UA: 1.01 (ref 1–1.03)
SQUAMOUS EPITHELIAL: <1 /HPF
TRANSITIONAL EPITHELIAL: <1 /HPF
TRICHOMONAS: ABNORMAL /HPF
UROBILINOGEN, URINE: 0.2 MG/DL (ref 0.2–1)
WBC UA: 1 /HPF (ref 0–2)
YEAST: ABNORMAL /HPF

## 2023-05-08 PROCEDURE — 81001 URINALYSIS AUTO W/SCOPE: CPT

## 2023-05-08 PROCEDURE — 87186 SC STD MICRODIL/AGAR DIL: CPT

## 2023-05-08 PROCEDURE — 1036F TOBACCO NON-USER: CPT | Performed by: NURSE PRACTITIONER

## 2023-05-08 PROCEDURE — 87077 CULTURE AEROBIC IDENTIFY: CPT

## 2023-05-08 PROCEDURE — G8417 CALC BMI ABV UP PARAM F/U: HCPCS | Performed by: NURSE PRACTITIONER

## 2023-05-08 PROCEDURE — 1123F ACP DISCUSS/DSCN MKR DOCD: CPT | Performed by: NURSE PRACTITIONER

## 2023-05-08 PROCEDURE — 87086 URINE CULTURE/COLONY COUNT: CPT

## 2023-05-08 PROCEDURE — G8427 DOCREV CUR MEDS BY ELIG CLIN: HCPCS | Performed by: NURSE PRACTITIONER

## 2023-05-08 PROCEDURE — 99214 OFFICE O/P EST MOD 30 MIN: CPT | Performed by: NURSE PRACTITIONER

## 2023-05-08 RX ORDER — SPIRONOLACTONE 25 MG/1
12.5 TABLET ORAL DAILY
Qty: 30 TABLET | Refills: 1 | Status: SHIPPED | OUTPATIENT
Start: 2023-05-08

## 2023-05-08 ASSESSMENT — ENCOUNTER SYMPTOMS
COUGH: 0
SHORTNESS OF BREATH: 0

## 2023-05-08 NOTE — PROGRESS NOTES
CARDIOLOGY  NOTE    2023    Cristobal Gagnon (:  1935) is a 80 y.o. Harl Filter established patient with Dr. Elmo Munoz, here for evaluation of the following chief complaint(s):  Chest Pain (Dull aches, doesn't happen often, not new. Usually goes away in 30 minutes. )        SUBJECTIVE/OBJECTIVE:    JOSE Ohara has Past medical history as noted below. Very pleasant gentleman comes in with his daughter and son-in-law he is just moved to [facility] from [place]. He is wheelchair-bound mostly because he is legally blind. He is here for cardiomyopathy with ejection  fraction of 25% he did see nephrology as well currently not on ACE or an ARB because of creatinine of 2.1  But started on entresto after getting cleared from nephrology    Is alternating between 40 and 80 mg of Lasix that is helped a lot with his ankle swelling breathing is improved    Reports that he has been Sleeping in lounge chair, once in awhile gest chets pain, belching helps . His echo shows severe daily reduced EF of about 25% with wall motion abnormality stress test shows large anterior septal defect with no ischemia   EKG shows left bundle branch block. Hehas history of A. fib he is on Coumadin but really had echo and stress test in [place] sometime back and used to see cardiology over there . Have history of chronic kidney disease with creatinine 2.2 range. Review of Systems   Constitutional:  Negative for fatigue and fever. Respiratory:  Negative for cough and shortness of breath. Cardiovascular:  Negative for chest pain, palpitations and leg swelling. Musculoskeletal:  Negative for arthralgias and gait problem. Neurological:  Negative for dizziness, syncope, weakness, light-headedness and headaches.      Vitals:    23 1150   BP: 128/68   Site: Right Upper Arm   Position: Sitting   Cuff Size: Medium Adult   Pulse: 72   SpO2: 96%   Weight: 214 lb (97.1 kg)   Height: 6' 4\" (1.93 m)       Wt Readings from Last 3 Encounters:

## 2023-05-11 LAB
CULTURE: ABNORMAL
Lab: ABNORMAL
SPECIMEN: ABNORMAL

## 2023-05-24 ENCOUNTER — PROCEDURE VISIT (OUTPATIENT)
Dept: CARDIOLOGY CLINIC | Age: 88
End: 2023-05-24

## 2023-05-24 DIAGNOSIS — R94.30 LOW LEFT VENTRICULAR EJECTION FRACTION: Primary | ICD-10-CM

## 2023-05-24 DIAGNOSIS — I50.33 ACUTE ON CHRONIC DIASTOLIC CONGESTIVE HEART FAILURE (HCC): ICD-10-CM

## 2023-05-24 DIAGNOSIS — R06.02 SOB (SHORTNESS OF BREATH): ICD-10-CM

## 2023-05-24 LAB
LV EF: 48 %
LVEF MODALITY: NORMAL

## 2023-05-25 ENCOUNTER — TELEPHONE (OUTPATIENT)
Dept: CARDIOLOGY CLINIC | Age: 88
End: 2023-05-25

## 2023-05-25 NOTE — TELEPHONE ENCOUNTER
HILLARY Pagan - ANNIKA Salazar MA  EF has improved continue current plan and follow up as scheduled     Spoke to patients daughter regarding results of MUGA. Patients daughter voiced understanding.

## 2023-05-30 ENCOUNTER — TELEPHONE (OUTPATIENT)
Dept: CARDIOLOGY CLINIC | Age: 88
End: 2023-05-30

## 2023-05-30 NOTE — TELEPHONE ENCOUNTER
Ok hold aldactone for 7 days and see if cough goes away   If it does not go away it is not du eot meds  Get cxr and bnp if cough dose not go away

## 2023-06-05 ENCOUNTER — TELEPHONE (OUTPATIENT)
Dept: CARDIOLOGY CLINIC | Age: 88
End: 2023-06-05

## 2023-06-05 NOTE — TELEPHONE ENCOUNTER
Daughter called [facility] is telling them Shelby Carmen has been deleted from his medication list he is out of this mdication

## 2023-06-05 NOTE — TELEPHONE ENCOUNTER
Entresto needs refilled. This is not on his medication list. Patient thinks this was a trail basis but however is reporting good results. Also, patient still is hacking and coughing.

## 2023-06-07 ENCOUNTER — TELEPHONE (OUTPATIENT)
Dept: CARDIOLOGY CLINIC | Age: 88
End: 2023-06-07

## 2023-06-07 NOTE — TELEPHONE ENCOUNTER
The patient daughter called,   DOS 1/16/2023- he got the bill for  $117.39. I emailed [email or web address]:    The coverages is not correct. He has Medicare Primary;  then, the 2nd is the  C&F Medicare Supplement-  [address] [place] [address], [place]. It looks like it was billed to a [employer] ins. If this could be corrected and refiled to the C&F insurance.

## 2023-06-08 ENCOUNTER — TELEPHONE (OUTPATIENT)
Dept: CARDIOLOGY CLINIC | Age: 88
End: 2023-06-08

## 2023-06-08 NOTE — TELEPHONE ENCOUNTER
This is the response from billing, DOS 1/16/2023 balance for $117.39, billing refiled to the 2nd C&F.

## 2023-06-20 ENCOUNTER — OFFICE VISIT (OUTPATIENT)
Dept: CARDIOLOGY CLINIC | Age: 88
End: 2023-06-20
Payer: MEDICARE

## 2023-06-20 VITALS
DIASTOLIC BLOOD PRESSURE: 68 MMHG | WEIGHT: 215 LBS | HEIGHT: 76 IN | SYSTOLIC BLOOD PRESSURE: 102 MMHG | HEART RATE: 58 BPM | OXYGEN SATURATION: 96 % | BODY MASS INDEX: 26.18 KG/M2

## 2023-06-20 DIAGNOSIS — I44.7 LBBB (LEFT BUNDLE BRANCH BLOCK): ICD-10-CM

## 2023-06-20 DIAGNOSIS — I25.5 ISCHEMIC CARDIOMYOPATHY: ICD-10-CM

## 2023-06-20 DIAGNOSIS — N18.4 CKD (CHRONIC KIDNEY DISEASE) STAGE 4, GFR 15-29 ML/MIN (HCC): ICD-10-CM

## 2023-06-20 DIAGNOSIS — D68.69 SECONDARY HYPERCOAGULABLE STATE (HCC): ICD-10-CM

## 2023-06-20 DIAGNOSIS — I50.33 ACUTE ON CHRONIC DIASTOLIC CONGESTIVE HEART FAILURE (HCC): ICD-10-CM

## 2023-06-20 DIAGNOSIS — I48.0 PAROXYSMAL ATRIAL FIBRILLATION (HCC): Primary | ICD-10-CM

## 2023-06-20 PROCEDURE — 1123F ACP DISCUSS/DSCN MKR DOCD: CPT | Performed by: INTERNAL MEDICINE

## 2023-06-20 PROCEDURE — G8417 CALC BMI ABV UP PARAM F/U: HCPCS | Performed by: INTERNAL MEDICINE

## 2023-06-20 PROCEDURE — G8427 DOCREV CUR MEDS BY ELIG CLIN: HCPCS | Performed by: INTERNAL MEDICINE

## 2023-06-20 PROCEDURE — 99214 OFFICE O/P EST MOD 30 MIN: CPT | Performed by: INTERNAL MEDICINE

## 2023-06-20 PROCEDURE — 1036F TOBACCO NON-USER: CPT | Performed by: INTERNAL MEDICINE

## 2023-06-20 NOTE — PROGRESS NOTES
April 2023 titrate medication monthly till then    Hypertension   His blood pressure in the office todayCarvedilol 12.5 mg  . Stopped amlodipine  in anticipation of starting Entresto and he takes Hytrin 5 mg at night. Stage 3a chronic kidney disease (HCC)  Creatinine is 2.2 range continue Lasix for now, He has had several UTI and has been on antibiotics      Dyslipidemia :  All available lab work was reviewed. Patient was advised to repeat lab work before next visit. Necessary orders were placed , instructions given by myself       Counseled extensively and medication compliance urged. We discussed that for the  prevention of ASCVD our  goal is aggressive risk modification. Patient is encouraged to exercise if they can , educated about  brisk walk for 30 minutes  at least 3 to 4 times a week if there are no physical limitations  Various goals were discussed and questions answered. Continue current medications. Appropriate prescriptions are addressed and refills ordered. Questions answered and patient verbalizes understanding. Call for any problems, questions, or concerns. Greater than 60 % of time spent counseling besides reviewing data and images     Continue all other medications of all above medical condition listed as is. Return in about 3 months (around 9/20/2023). Please note this report has been partially produced using speech recognition software and may contain errors related to that system including errors in grammar, punctuation, and spelling, as well as words and phrases that may be inappropriate. If there are any questions or concerns please feel free to contact the dictating provider for clarification.

## 2023-06-20 NOTE — PATIENT INSTRUCTIONS
Please be informed that if you contact our office outside of normal business hours the physician on call cannot help with any scheduling or rescheduling issues, procedure instruction questions or any type of medication issue. We advise you for any urgent/emergency that you go to the nearest emergency room! PLEASE CALL OUR OFFICE DURING NORMAL BUSINESS HOURS    Monday - Friday   8 am to 5 pm    [place] [address]: [phone]    [place]:  [phone]    **It is YOUR responsibilty to bring medication bottles and/or updated medication list to [address] [place] [address]. This will allow us to better serve you and all your healthcare needs**    Thank you for allowing us to care for you today! We want to ensure we can follow your treatment plan and we strive to give you the best outcomes and experience possible. If you ever have a life threatening emergency and call 911 - for an ambulance (EMS)   Our providers can only care for you at:   [facility] or [facility]. Even if you have someone take you or you drive yourself we can only care for you in a [facility] facility. Our providers are not setup at the other healthcare locations!

## 2023-07-05 RX ORDER — SPIRONOLACTONE 25 MG/1
12.5 TABLET ORAL DAILY
Qty: 30 TABLET | Refills: 5 | Status: SHIPPED | OUTPATIENT
Start: 2023-07-05

## 2023-07-26 ENCOUNTER — HOSPITAL ENCOUNTER (OUTPATIENT)
Age: 88
Discharge: HOME | End: 2023-07-26
Payer: MEDICARE

## 2023-07-26 DIAGNOSIS — I48.0 PAROXYSMAL ATRIAL FIBRILLATION (HCC): ICD-10-CM

## 2023-07-26 DIAGNOSIS — H54.8 LEGALLY BLIND: ICD-10-CM

## 2023-07-26 DIAGNOSIS — I50.22 CHRONIC SYSTOLIC (CONGESTIVE) HEART FAILURE (HCC): ICD-10-CM

## 2023-07-26 DIAGNOSIS — I10 HYPERTENSION, UNSPECIFIED TYPE: ICD-10-CM

## 2023-07-26 DIAGNOSIS — R35.0 URINARY FREQUENCY: ICD-10-CM

## 2023-07-26 DIAGNOSIS — N18.32 STAGE 3B CHRONIC KIDNEY DISEASE (HCC): ICD-10-CM

## 2023-07-26 LAB
ALBUMIN SERPL-MCNC: 3.7 GM/DL (ref 3.4–5)
ANION GAP SERPL CALCULATED.3IONS-SCNC: 10 MMOL/L (ref 4–16)
BACTERIA: NEGATIVE /HPF
BILIRUBIN URINE: NEGATIVE MG/DL
BLOOD, URINE: NEGATIVE
BUN SERPL-MCNC: 30 MG/DL (ref 6–23)
CALCIUM SERPL-MCNC: 7.7 MG/DL (ref 8.3–10.6)
CHLORIDE BLD-SCNC: 102 MMOL/L (ref 99–110)
CLARITY: CLEAR
CO2: 26 MMOL/L (ref 21–32)
COLOR: YELLOW
CREAT SERPL-MCNC: 2.1 MG/DL (ref 0.9–1.3)
CREATININE URINE: 131.6 MG/DL (ref 39–259)
GFR SERPL CREATININE-BSD FRML MDRD: 30 ML/MIN/1.73M2
GLUCOSE SERPL-MCNC: 95 MG/DL (ref 70–99)
GLUCOSE, URINE: NEGATIVE MG/DL
KETONES, URINE: NEGATIVE MG/DL
LEUKOCYTE ESTERASE, URINE: ABNORMAL
MAGNESIUM: 2.8 MG/DL (ref 1.8–2.4)
NITRITE URINE, QUANTITATIVE: NEGATIVE
PH, URINE: 7 (ref 5–8)
PHOSPHORUS: 3 MG/DL (ref 2.5–4.9)
POTASSIUM SERPL-SCNC: 4.4 MMOL/L (ref 3.5–5.1)
PROT/CREAT RATIO, UR: 0.1
PROTEIN UA: NEGATIVE MG/DL
RBC URINE: 2 /HPF (ref 0–3)
SODIUM BLD-SCNC: 138 MMOL/L (ref 135–145)
SPECIFIC GRAVITY UA: 1.01 (ref 1–1.03)
SQUAMOUS EPITHELIAL: 2 /HPF
TRICHOMONAS: ABNORMAL /HPF
URINE TOTAL PROTEIN: 12.3 MG/DL
UROBILINOGEN, URINE: 0.2 MG/DL (ref 0.2–1)
WBC UA: 16 /HPF (ref 0–2)

## 2023-07-26 PROCEDURE — 82040 ASSAY OF SERUM ALBUMIN: CPT

## 2023-07-26 PROCEDURE — 84156 ASSAY OF PROTEIN URINE: CPT

## 2023-07-26 PROCEDURE — 83735 ASSAY OF MAGNESIUM: CPT

## 2023-07-26 PROCEDURE — 36415 COLL VENOUS BLD VENIPUNCTURE: CPT

## 2023-07-26 PROCEDURE — 82570 ASSAY OF URINE CREATININE: CPT

## 2023-07-26 PROCEDURE — 84100 ASSAY OF PHOSPHORUS: CPT

## 2023-07-26 PROCEDURE — 80048 BASIC METABOLIC PNL TOTAL CA: CPT

## 2023-07-26 PROCEDURE — 81001 URINALYSIS AUTO W/SCOPE: CPT

## 2023-08-24 ENCOUNTER — HOSPITAL ENCOUNTER (OUTPATIENT)
Age: 88
Discharge: HOME | End: 2023-08-24
Payer: MEDICARE

## 2023-08-24 ENCOUNTER — OFFICE VISIT (OUTPATIENT)
Dept: CARDIOLOGY CLINIC | Age: 88
End: 2023-08-24
Payer: MEDICARE

## 2023-08-24 ENCOUNTER — HOSPITAL ENCOUNTER (OUTPATIENT)
Dept: GENERAL RADIOLOGY | Age: 88
Discharge: HOME | End: 2023-08-24
Payer: MEDICARE

## 2023-08-24 VITALS
BODY MASS INDEX: 26.18 KG/M2 | HEIGHT: 76 IN | WEIGHT: 215 LBS | OXYGEN SATURATION: 96 % | SYSTOLIC BLOOD PRESSURE: 124 MMHG | DIASTOLIC BLOOD PRESSURE: 60 MMHG | HEART RATE: 64 BPM

## 2023-08-24 DIAGNOSIS — I50.33 ACUTE ON CHRONIC DIASTOLIC CONGESTIVE HEART FAILURE (HCC): ICD-10-CM

## 2023-08-24 DIAGNOSIS — I25.5 ISCHEMIC CARDIOMYOPATHY: ICD-10-CM

## 2023-08-24 DIAGNOSIS — I44.7 LBBB (LEFT BUNDLE BRANCH BLOCK): ICD-10-CM

## 2023-08-24 DIAGNOSIS — N18.31 STAGE 3A CHRONIC KIDNEY DISEASE (HCC): ICD-10-CM

## 2023-08-24 DIAGNOSIS — I10 HYPERTENSION, UNSPECIFIED TYPE: ICD-10-CM

## 2023-08-24 DIAGNOSIS — D68.69 HYPERCOAGULABLE STATE DUE TO PAROXYSMAL ATRIAL FIBRILLATION (HCC): ICD-10-CM

## 2023-08-24 DIAGNOSIS — I48.0 HYPERCOAGULABLE STATE DUE TO PAROXYSMAL ATRIAL FIBRILLATION (HCC): ICD-10-CM

## 2023-08-24 DIAGNOSIS — I48.20 CHRONIC ATRIAL FIBRILLATION (HCC): ICD-10-CM

## 2023-08-24 DIAGNOSIS — I50.33 ACUTE ON CHRONIC DIASTOLIC CONGESTIVE HEART FAILURE (HCC): Primary | ICD-10-CM

## 2023-08-24 LAB
ANION GAP SERPL CALCULATED.3IONS-SCNC: 18 MMOL/L (ref 4–16)
BUN SERPL-MCNC: 40 MG/DL (ref 6–23)
CALCIUM SERPL-MCNC: 7.3 MG/DL (ref 8.3–10.6)
CHLORIDE BLD-SCNC: 106 MMOL/L (ref 99–110)
CO2: 20 MMOL/L (ref 21–32)
CREAT SERPL-MCNC: 2.3 MG/DL (ref 0.9–1.3)
GFR SERPL CREATININE-BSD FRML MDRD: 27 ML/MIN/1.73M2
GLUCOSE SERPL-MCNC: 129 MG/DL (ref 70–99)
POTASSIUM SERPL-SCNC: 4.2 MMOL/L (ref 3.5–5.1)
SODIUM BLD-SCNC: 144 MMOL/L (ref 135–145)

## 2023-08-24 PROCEDURE — G8417 CALC BMI ABV UP PARAM F/U: HCPCS | Performed by: NURSE PRACTITIONER

## 2023-08-24 PROCEDURE — 71046 X-RAY EXAM CHEST 2 VIEWS: CPT

## 2023-08-24 PROCEDURE — 1036F TOBACCO NON-USER: CPT | Performed by: NURSE PRACTITIONER

## 2023-08-24 PROCEDURE — 36415 COLL VENOUS BLD VENIPUNCTURE: CPT

## 2023-08-24 PROCEDURE — 1123F ACP DISCUSS/DSCN MKR DOCD: CPT | Performed by: NURSE PRACTITIONER

## 2023-08-24 PROCEDURE — 99214 OFFICE O/P EST MOD 30 MIN: CPT | Performed by: NURSE PRACTITIONER

## 2023-08-24 PROCEDURE — 80048 BASIC METABOLIC PNL TOTAL CA: CPT

## 2023-08-24 PROCEDURE — G8427 DOCREV CUR MEDS BY ELIG CLIN: HCPCS | Performed by: NURSE PRACTITIONER

## 2023-08-24 RX ORDER — CHOLECALCIFEROL (VITAMIN D3) 125 MCG
10 CAPSULE ORAL DAILY
COMMUNITY

## 2023-08-24 ASSESSMENT — ENCOUNTER SYMPTOMS
COUGH: 0
SHORTNESS OF BREATH: 0

## 2023-08-24 NOTE — PROGRESS NOTES
[facility]        Echo 9/7/2022      Left ventricular function is severely abnormal , EF is estimated at 25-30%. Dilated left ventricle noted. Moderate left ventricular hypertrophy. Grade II diastolic dysfunction. Abnormal (paradoxical) motion consistent with left bundle branch block. Global hypokinesis noted. Moderately dilated left atrium. Mild mitral regurgitation is present. No evidence of pericardial effusion. Stress 9/7/22  Summary    Supervising physician Dr. Cirilo Faria . Dilated left ventricle with EDV of 191 ML    Global hypokinesis with reduced EF of 29 %    Severe defect of medium size of apical wall , Septal defect of medium size    No ischemia , Abnormal stress images , ischemic cardiomyopathy    Abnormal stress test         Echo 2/2023  Summary   Left ventricular systolic function is abnormal.   Ejection fraction is visually estimated at 20%. Global hypokinesis noted. Septal bounce present consistent with arrhythmia. Mild mitral regurgitation. No evidence of any pericardial effusion. MUGA 5/2023  Summary   Left ventricular ejection fraction of 48 %. Supervising physician Dr. Cirilo Faria . Septal wall hypokinesis    ASSESSMENT/PLAN:     CHF:  Shortness of breath on exertion  Does not quality for home o2 any longer. Appears compensated. Continue entresto 41/59 mg BID and Coreg 12.5 mg BID. Cough intensified after starting aldactone. ? Coincidence- continue to cough will check chest x ray       Acute on chronic diastolic congestive heart failure (HCC)   Lasix to 40 daily on alternate days and 40 mg daily until we get more data. We had extensive discussion advised to cut down on salt intake     Paroxysmal atrial fibrillation (HCC)   EKG shows left bundle branch block with PVCs and sinus rhythm. He is on Coumadin. Dr. Lin Boast INR at PCP office.      LBBB (left bundle branch block)  Eventually will plan cardiac catheterization   Will check chest xray and plan

## 2023-08-28 ENCOUNTER — TELEPHONE (OUTPATIENT)
Dept: CARDIOLOGY CLINIC | Age: 88
End: 2023-08-28

## 2023-08-28 RX ORDER — FUROSEMIDE 40 MG/1
40 TABLET ORAL DAILY
Qty: 90 TABLET | Refills: 3 | Status: SHIPPED | OUTPATIENT
Start: 2023-08-28

## 2023-08-28 NOTE — TELEPHONE ENCOUNTER
Called pt for XR chest result and Ying's note:  Normal chest xray   FINDINGS:  There is hyperinflation of the lungs with flattening of the diaphragms  suggesting COPD. There is attenuation of the pulmonary vasculature  suggesting emphysematous changes. The lungs are without acute focal process. There is no effusion or  pneumothorax. The cardiomediastinal silhouette is stable. The osseous  structures are stable. IMPRESSION:  No acute process. Stable findings of COPD and emphysema. Pt's daughter answered and she verbalized understanding.

## 2023-08-30 PROBLEM — I48.0 HYPERCOAGULABLE STATE DUE TO PAROXYSMAL ATRIAL FIBRILLATION (HCC): Status: ACTIVE | Noted: 2023-06-20

## 2023-09-13 ENCOUNTER — TELEPHONE (OUTPATIENT)
Dept: CARDIOLOGY CLINIC | Age: 88
End: 2023-09-13

## 2023-10-26 ENCOUNTER — HOSPITAL ENCOUNTER (OUTPATIENT)
Age: 88
Discharge: HOME | End: 2023-10-26
Payer: MEDICARE

## 2023-10-26 DIAGNOSIS — N18.32 STAGE 3B CHRONIC KIDNEY DISEASE (HCC): ICD-10-CM

## 2023-10-26 DIAGNOSIS — I48.0 PAROXYSMAL ATRIAL FIBRILLATION (HCC): ICD-10-CM

## 2023-10-26 DIAGNOSIS — I10 HYPERTENSION, UNSPECIFIED TYPE: ICD-10-CM

## 2023-10-26 DIAGNOSIS — N40.0 BENIGN PROSTATIC HYPERPLASIA WITHOUT LOWER URINARY TRACT SYMPTOMS: ICD-10-CM

## 2023-10-26 DIAGNOSIS — I25.5 ISCHEMIC CARDIOMYOPATHY: ICD-10-CM

## 2023-10-26 LAB
ALBUMIN SERPL-MCNC: 3.6 GM/DL (ref 3.4–5)
ANION GAP SERPL CALCULATED.3IONS-SCNC: 12 MMOL/L (ref 4–16)
BUN SERPL-MCNC: 30 MG/DL (ref 6–23)
CALCIUM SERPL-MCNC: 8.1 MG/DL (ref 8.3–10.6)
CHLORIDE BLD-SCNC: 104 MMOL/L (ref 99–110)
CO2: 26 MMOL/L (ref 21–32)
CREAT SERPL-MCNC: 2.3 MG/DL (ref 0.9–1.3)
GFR SERPL CREATININE-BSD FRML MDRD: 27 ML/MIN/1.73M2
GLUCOSE SERPL-MCNC: 111 MG/DL (ref 70–99)
MAGNESIUM: 2.2 MG/DL (ref 1.8–2.4)
PHOSPHORUS: 2.1 MG/DL (ref 2.5–4.9)
POTASSIUM SERPL-SCNC: 4.1 MMOL/L (ref 3.5–5.1)
SODIUM BLD-SCNC: 142 MMOL/L (ref 135–145)

## 2023-10-26 PROCEDURE — 36415 COLL VENOUS BLD VENIPUNCTURE: CPT

## 2023-10-26 PROCEDURE — 80048 BASIC METABOLIC PNL TOTAL CA: CPT

## 2023-10-26 PROCEDURE — 83735 ASSAY OF MAGNESIUM: CPT

## 2023-10-26 PROCEDURE — 84100 ASSAY OF PHOSPHORUS: CPT

## 2023-10-26 PROCEDURE — 82040 ASSAY OF SERUM ALBUMIN: CPT

## 2023-10-27 ENCOUNTER — HOSPITAL ENCOUNTER (OUTPATIENT)
Age: 88
Setting detail: SPECIMEN
Discharge: HOME | End: 2023-10-27
Payer: MEDICARE

## 2023-10-27 LAB
BACTERIA: NEGATIVE /HPF
BILIRUBIN URINE: NEGATIVE MG/DL
BLOOD, URINE: NEGATIVE
CLARITY: CLEAR
COLOR: YELLOW
CREATININE URINE: 113.3 MG/DL (ref 39–259)
GLUCOSE, URINE: NEGATIVE MG/DL
HYALINE CASTS: 2 /LPF
KETONES, URINE: NEGATIVE MG/DL
LEUKOCYTE ESTERASE, URINE: ABNORMAL
NITRITE URINE, QUANTITATIVE: NEGATIVE
PH, URINE: 5.5 (ref 5–8)
PROT/CREAT RATIO, UR: 0.1
PROTEIN UA: NEGATIVE MG/DL
RBC URINE: <1 /HPF (ref 0–3)
SPECIFIC GRAVITY UA: 1.01 (ref 1–1.03)
TRICHOMONAS: NORMAL /HPF
URINE TOTAL PROTEIN: 8.6 MG/DL
UROBILINOGEN, URINE: 0.2 MG/DL (ref 0.2–1)
WBC UA: 1 /HPF (ref 0–2)

## 2023-10-27 PROCEDURE — 84156 ASSAY OF PROTEIN URINE: CPT

## 2023-10-27 PROCEDURE — 82570 ASSAY OF URINE CREATININE: CPT

## 2023-10-27 PROCEDURE — 81001 URINALYSIS AUTO W/SCOPE: CPT

## 2023-11-10 RX ORDER — CARVEDILOL 12.5 MG/1
12.5 TABLET ORAL 2 TIMES DAILY WITH MEALS
Qty: 60 TABLET | Refills: 3 | Status: SHIPPED | OUTPATIENT
Start: 2023-11-10

## 2024-01-29 ENCOUNTER — HOSPITAL ENCOUNTER (OUTPATIENT)
Age: 89
Discharge: HOME | End: 2024-01-29
Payer: MEDICARE

## 2024-01-29 DIAGNOSIS — N40.0 BENIGN PROSTATIC HYPERPLASIA WITHOUT LOWER URINARY TRACT SYMPTOMS: ICD-10-CM

## 2024-01-29 DIAGNOSIS — I48.0 PAROXYSMAL ATRIAL FIBRILLATION (HCC): ICD-10-CM

## 2024-01-29 DIAGNOSIS — I25.5 ISCHEMIC CARDIOMYOPATHY: ICD-10-CM

## 2024-01-29 DIAGNOSIS — I10 HYPERTENSION, UNSPECIFIED TYPE: ICD-10-CM

## 2024-01-29 DIAGNOSIS — N18.4 CKD (CHRONIC KIDNEY DISEASE) STAGE 4, GFR 15-29 ML/MIN (HCC): ICD-10-CM

## 2024-01-29 LAB
ALBUMIN SERPL-MCNC: 3.7 GM/DL (ref 3.4–5)
ANION GAP SERPL CALCULATED.3IONS-SCNC: 10 MMOL/L (ref 7–16)
BILIRUBIN URINE: NEGATIVE MG/DL
BLOOD, URINE: NEGATIVE
BUN SERPL-MCNC: 26 MG/DL (ref 6–23)
CALCIUM SERPL-MCNC: 7.5 MG/DL (ref 8.3–10.6)
CHLORIDE BLD-SCNC: 99 MMOL/L (ref 99–110)
CLARITY: CLEAR
CO2: 31 MMOL/L (ref 21–32)
COLOR: YELLOW
COMMENT UA: NORMAL
CREAT SERPL-MCNC: 1.9 MG/DL (ref 0.9–1.3)
CREATININE URINE: 143.5 MG/DL (ref 39–259)
GFR SERPL CREATININE-BSD FRML MDRD: 34 ML/MIN/1.73M2
GLUCOSE SERPL-MCNC: 80 MG/DL (ref 70–99)
GLUCOSE, URINE: NEGATIVE MG/DL
KETONES, URINE: NEGATIVE MG/DL
LEUKOCYTE ESTERASE, URINE: NEGATIVE
MAGNESIUM: 2.2 MG/DL (ref 1.8–2.4)
NITRITE URINE, QUANTITATIVE: NEGATIVE
PH, URINE: 6.5 (ref 5–8)
PHOSPHORUS: 2.9 MG/DL (ref 2.5–4.9)
POTASSIUM SERPL-SCNC: 3.9 MMOL/L (ref 3.5–5.1)
PROT/CREAT RATIO, UR: 0.1
PROTEIN UA: NEGATIVE MG/DL
SODIUM BLD-SCNC: 140 MMOL/L (ref 135–145)
SPECIFIC GRAVITY UA: 1.01 (ref 1–1.03)
URINE TOTAL PROTEIN: 9.1 MG/DL
UROBILINOGEN, URINE: 0.2 MG/DL (ref 0.2–1)

## 2024-01-29 PROCEDURE — 82040 ASSAY OF SERUM ALBUMIN: CPT

## 2024-01-29 PROCEDURE — 83735 ASSAY OF MAGNESIUM: CPT

## 2024-01-29 PROCEDURE — 36415 COLL VENOUS BLD VENIPUNCTURE: CPT

## 2024-01-29 PROCEDURE — 81003 URINALYSIS AUTO W/O SCOPE: CPT

## 2024-01-29 PROCEDURE — 80048 BASIC METABOLIC PNL TOTAL CA: CPT

## 2024-01-29 PROCEDURE — 82570 ASSAY OF URINE CREATININE: CPT

## 2024-01-29 PROCEDURE — 84100 ASSAY OF PHOSPHORUS: CPT

## 2024-01-29 PROCEDURE — 84156 ASSAY OF PROTEIN URINE: CPT

## 2024-02-06 RX ORDER — CARVEDILOL 12.5 MG/1
12.5 TABLET ORAL 2 TIMES DAILY WITH MEALS
Qty: 180 TABLET | Refills: 3 | Status: SHIPPED | OUTPATIENT
Start: 2024-02-06

## 2024-04-25 ENCOUNTER — HOSPITAL ENCOUNTER (INPATIENT)
Age: 89
LOS: 2 days | Discharge: HOME HEALTH | DRG: 683 | End: 2024-04-27
Attending: STUDENT IN AN ORGANIZED HEALTH CARE EDUCATION/TRAINING PROGRAM
Payer: MEDICARE

## 2024-04-25 PROBLEM — N17.9 AKI (ACUTE KIDNEY INJURY) (HCC): Status: ACTIVE | Noted: 2024-04-25

## 2024-04-25 PROBLEM — R53.1 GENERALIZED WEAKNESS: Status: ACTIVE | Noted: 2024-04-25

## 2024-04-25 PROCEDURE — 2580000003 HC RX 258: Performed by: STUDENT IN AN ORGANIZED HEALTH CARE EDUCATION/TRAINING PROGRAM

## 2024-04-25 PROCEDURE — 1200000000 HC SEMI PRIVATE

## 2024-04-25 PROCEDURE — 6370000000 HC RX 637 (ALT 250 FOR IP): Performed by: STUDENT IN AN ORGANIZED HEALTH CARE EDUCATION/TRAINING PROGRAM

## 2024-04-25 RX ORDER — POTASSIUM CHLORIDE 7.45 MG/ML
10 INJECTION INTRAVENOUS PRN
Status: DISCONTINUED | OUTPATIENT
Start: 2024-04-25 | End: 2024-04-25

## 2024-04-25 RX ORDER — ACETAMINOPHEN 325 MG/1
650 TABLET ORAL EVERY 6 HOURS PRN
Status: DISCONTINUED | OUTPATIENT
Start: 2024-04-25 | End: 2024-04-27 | Stop reason: HOSPADM

## 2024-04-25 RX ORDER — HEPARIN SODIUM 5000 [USP'U]/ML
5000 INJECTION, SOLUTION INTRAVENOUS; SUBCUTANEOUS EVERY 8 HOURS SCHEDULED
Status: DISCONTINUED | OUTPATIENT
Start: 2024-04-25 | End: 2024-04-25

## 2024-04-25 RX ORDER — ACETAMINOPHEN 650 MG/1
650 SUPPOSITORY RECTAL EVERY 6 HOURS PRN
Status: DISCONTINUED | OUTPATIENT
Start: 2024-04-25 | End: 2024-04-27 | Stop reason: HOSPADM

## 2024-04-25 RX ORDER — ENOXAPARIN SODIUM 100 MG/ML
40 INJECTION SUBCUTANEOUS DAILY
Status: DISCONTINUED | OUTPATIENT
Start: 2024-04-25 | End: 2024-04-25

## 2024-04-25 RX ORDER — SODIUM CHLORIDE 0.9 % (FLUSH) 0.9 %
5-40 SYRINGE (ML) INJECTION PRN
Status: DISCONTINUED | OUTPATIENT
Start: 2024-04-25 | End: 2024-04-27 | Stop reason: HOSPADM

## 2024-04-25 RX ORDER — MAGNESIUM SULFATE IN WATER 40 MG/ML
2000 INJECTION, SOLUTION INTRAVENOUS PRN
Status: DISCONTINUED | OUTPATIENT
Start: 2024-04-25 | End: 2024-04-25

## 2024-04-25 RX ORDER — POLYETHYLENE GLYCOL 3350 17 G/17G
17 POWDER, FOR SOLUTION ORAL DAILY PRN
Status: DISCONTINUED | OUTPATIENT
Start: 2024-04-25 | End: 2024-04-27 | Stop reason: HOSPADM

## 2024-04-25 RX ORDER — POLYETHYLENE GLYCOL 3350 17 G/17G
17 POWDER, FOR SOLUTION ORAL DAILY
Status: DISCONTINUED | OUTPATIENT
Start: 2024-04-25 | End: 2024-04-27 | Stop reason: HOSPADM

## 2024-04-25 RX ORDER — PANTOPRAZOLE SODIUM 40 MG/1
40 TABLET, DELAYED RELEASE ORAL DAILY
Status: DISCONTINUED | OUTPATIENT
Start: 2024-04-25 | End: 2024-04-27 | Stop reason: HOSPADM

## 2024-04-25 RX ORDER — POTASSIUM CHLORIDE 20 MEQ/1
40 TABLET, EXTENDED RELEASE ORAL PRN
Status: DISCONTINUED | OUTPATIENT
Start: 2024-04-25 | End: 2024-04-25

## 2024-04-25 RX ORDER — SODIUM CHLORIDE 9 MG/ML
INJECTION, SOLUTION INTRAVENOUS PRN
Status: DISCONTINUED | OUTPATIENT
Start: 2024-04-25 | End: 2024-04-27 | Stop reason: HOSPADM

## 2024-04-25 RX ORDER — DOXAZOSIN MESYLATE 4 MG/1
4 TABLET ORAL DAILY
Status: DISCONTINUED | OUTPATIENT
Start: 2024-04-25 | End: 2024-04-27 | Stop reason: HOSPADM

## 2024-04-25 RX ORDER — ONDANSETRON 2 MG/ML
4 INJECTION INTRAMUSCULAR; INTRAVENOUS EVERY 6 HOURS PRN
Status: DISCONTINUED | OUTPATIENT
Start: 2024-04-25 | End: 2024-04-27 | Stop reason: HOSPADM

## 2024-04-25 RX ORDER — SODIUM CHLORIDE, SODIUM LACTATE, POTASSIUM CHLORIDE, CALCIUM CHLORIDE 600; 310; 30; 20 MG/100ML; MG/100ML; MG/100ML; MG/100ML
INJECTION, SOLUTION INTRAVENOUS CONTINUOUS
Status: DISCONTINUED | OUTPATIENT
Start: 2024-04-25 | End: 2024-04-26

## 2024-04-25 RX ORDER — FINASTERIDE 5 MG/1
5 TABLET, FILM COATED ORAL DAILY
Status: DISCONTINUED | OUTPATIENT
Start: 2024-04-25 | End: 2024-04-27 | Stop reason: HOSPADM

## 2024-04-25 RX ORDER — ONDANSETRON 4 MG/1
4 TABLET, ORALLY DISINTEGRATING ORAL EVERY 8 HOURS PRN
Status: DISCONTINUED | OUTPATIENT
Start: 2024-04-25 | End: 2024-04-27 | Stop reason: HOSPADM

## 2024-04-25 RX ORDER — SODIUM CHLORIDE 0.9 % (FLUSH) 0.9 %
5-40 SYRINGE (ML) INJECTION EVERY 12 HOURS SCHEDULED
Status: DISCONTINUED | OUTPATIENT
Start: 2024-04-25 | End: 2024-04-27 | Stop reason: HOSPADM

## 2024-04-25 RX ADMIN — FINASTERIDE 5 MG: 5 TABLET, FILM COATED ORAL at 18:28

## 2024-04-25 RX ADMIN — DOXAZOSIN 4 MG: 4 TABLET ORAL at 18:28

## 2024-04-25 RX ADMIN — SODIUM CHLORIDE, POTASSIUM CHLORIDE, SODIUM LACTATE AND CALCIUM CHLORIDE: 600; 310; 30; 20 INJECTION, SOLUTION INTRAVENOUS at 18:39

## 2024-04-25 RX ADMIN — PANTOPRAZOLE SODIUM 40 MG: 40 TABLET, DELAYED RELEASE ORAL at 18:28

## 2024-04-26 LAB
ANION GAP SERPL CALCULATED.3IONS-SCNC: 12 MMOL/L (ref 7–16)
BUN SERPL-MCNC: 36 MG/DL (ref 6–23)
CALCIUM SERPL-MCNC: 8 MG/DL (ref 8.3–10.6)
CHLORIDE BLD-SCNC: 104 MMOL/L (ref 99–110)
CO2: 28 MMOL/L (ref 21–32)
CREAT SERPL-MCNC: 2.8 MG/DL (ref 0.9–1.3)
GFR SERPL CREATININE-BSD FRML MDRD: 21 ML/MIN/1.73M2
GLUCOSE SERPL-MCNC: 91 MG/DL (ref 70–99)
HCT VFR BLD CALC: 39.6 % (ref 42–52)
HEMOGLOBIN: 12.1 GM/DL (ref 13.5–18)
INR BLD: 1.7 INDEX
MAGNESIUM: 2.1 MG/DL (ref 1.8–2.4)
MCH RBC QN AUTO: 28.7 PG (ref 27–31)
MCHC RBC AUTO-ENTMCNC: 30.6 % (ref 32–36)
MCV RBC AUTO: 94.1 FL (ref 78–100)
PDW BLD-RTO: 13.6 % (ref 11.7–14.9)
PHOSPHORUS: 4.1 MG/DL (ref 2.5–4.9)
PLATELET # BLD: 148 K/CU MM (ref 140–440)
PMV BLD AUTO: 10 FL (ref 7.5–11.1)
POTASSIUM SERPL-SCNC: 4.6 MMOL/L (ref 3.5–5.1)
PROTHROMBIN TIME: 20.3 SECONDS (ref 11.7–14.5)
RBC # BLD: 4.21 M/CU MM (ref 4.6–6.2)
SODIUM BLD-SCNC: 144 MMOL/L (ref 135–145)
WBC # BLD: 10.8 K/CU MM (ref 4–10.5)

## 2024-04-26 PROCEDURE — 97166 OT EVAL MOD COMPLEX 45 MIN: CPT

## 2024-04-26 PROCEDURE — 1200000000 HC SEMI PRIVATE

## 2024-04-26 PROCEDURE — 97162 PT EVAL MOD COMPLEX 30 MIN: CPT

## 2024-04-26 PROCEDURE — 2580000003 HC RX 258: Performed by: STUDENT IN AN ORGANIZED HEALTH CARE EDUCATION/TRAINING PROGRAM

## 2024-04-26 PROCEDURE — 6370000000 HC RX 637 (ALT 250 FOR IP): Performed by: STUDENT IN AN ORGANIZED HEALTH CARE EDUCATION/TRAINING PROGRAM

## 2024-04-26 PROCEDURE — 83735 ASSAY OF MAGNESIUM: CPT

## 2024-04-26 PROCEDURE — 80048 BASIC METABOLIC PNL TOTAL CA: CPT

## 2024-04-26 PROCEDURE — 85610 PROTHROMBIN TIME: CPT

## 2024-04-26 PROCEDURE — 85027 COMPLETE CBC AUTOMATED: CPT

## 2024-04-26 PROCEDURE — 97530 THERAPEUTIC ACTIVITIES: CPT

## 2024-04-26 PROCEDURE — 84100 ASSAY OF PHOSPHORUS: CPT

## 2024-04-26 PROCEDURE — 94761 N-INVAS EAR/PLS OXIMETRY MLT: CPT

## 2024-04-26 RX ORDER — FUROSEMIDE 20 MG/1
20 TABLET ORAL DAILY PRN
Status: DISCONTINUED | OUTPATIENT
Start: 2024-04-26 | End: 2024-04-27 | Stop reason: HOSPADM

## 2024-04-26 RX ORDER — ENEMA 19; 7 G/133ML; G/133ML
1 ENEMA RECTAL
Status: COMPLETED | OUTPATIENT
Start: 2024-04-26 | End: 2024-04-26

## 2024-04-26 RX ORDER — WARFARIN SODIUM 4 MG/1
4 TABLET ORAL DAILY
Status: DISCONTINUED | OUTPATIENT
Start: 2024-04-26 | End: 2024-04-27

## 2024-04-26 RX ORDER — CARVEDILOL 6.25 MG/1
12.5 TABLET ORAL 2 TIMES DAILY WITH MEALS
Status: DISCONTINUED | OUTPATIENT
Start: 2024-04-26 | End: 2024-04-27 | Stop reason: HOSPADM

## 2024-04-26 RX ADMIN — WARFARIN SODIUM 4 MG: 4 TABLET ORAL at 17:42

## 2024-04-26 RX ADMIN — SODIUM CHLORIDE, PRESERVATIVE FREE 10 ML: 5 INJECTION INTRAVENOUS at 20:02

## 2024-04-26 RX ADMIN — FINASTERIDE 5 MG: 5 TABLET, FILM COATED ORAL at 09:52

## 2024-04-26 RX ADMIN — POLYETHYLENE GLYCOL 3350 17 G: 17 POWDER, FOR SOLUTION ORAL at 09:52

## 2024-04-26 RX ADMIN — PANTOPRAZOLE SODIUM 40 MG: 40 TABLET, DELAYED RELEASE ORAL at 09:52

## 2024-04-26 RX ADMIN — CARVEDILOL 12.5 MG: 6.25 TABLET, FILM COATED ORAL at 17:42

## 2024-04-26 RX ADMIN — SODIUM PHOSPHATE, DIBASIC AND SODIUM PHOSPHATE, MONOBASIC 1 ENEMA: 7; 19 ENEMA RECTAL at 16:27

## 2024-04-26 ASSESSMENT — ENCOUNTER SYMPTOMS
COLOR CHANGE: 0
VOICE CHANGE: 0
SINUS PAIN: 0
CONSTIPATION: 0
SINUS PRESSURE: 0
ABDOMINAL PAIN: 0
VOMITING: 0
BACK PAIN: 0
CHEST TIGHTNESS: 0
SORE THROAT: 0
NAUSEA: 0
DIARRHEA: 0
SHORTNESS OF BREATH: 0
WHEEZING: 0
TROUBLE SWALLOWING: 0
COUGH: 0

## 2024-04-26 NOTE — PROGRESS NOTES
PHARMACY ANTICOAGULATION MONITORING SERVICE    Duarte Reyes is a 88 y.o. male on warfarin therapy. Pharmacy consulted by Dr. Grover Mattson for monitoring and adjustment of treatment.    Indication for anticoagulation: Atrial fibrillation  INR goal: 2-3  Warfarin dose prior to admission: 4 mg daily    Pertinent Laboratory Values:   INR = 1.8, HGB = 12.5, HCT = 37.5 and platelet count = 156 per [facility]    Assessment/Plan:  Drug Interactions:   No significant interactions noted  INR = 1.8, subtherapeutic  Will continue patient's home warfarin regimen of 4 mg daily  Pharmacy will continue to monitor and adjust warfarin therapy as indicated    Thank you for the consult.  Orville Wheeler [facility]  4/26/2024 12:55 AM

## 2024-04-26 NOTE — PROGRESS NOTES
PHARMACY ANTICOAGULATION MONITORING SERVICE    Duatre Reyes is a 88 y.o. male on warfarin therapy. Pharmacy consulted by Dr. Grover Mattson for monitoring and adjustment of treatment.    Indication for anticoagulation: Atrial fibrillation  INR goal: 2-3  Warfarin dose prior to admission: Family notes warfarin 3mg daily as the current dose.    Pertinent Laboratory Values:   INR = 1.8, HGB = 12.5, HCT = 37.5 and platelet count = 156 per [facility] 4/25/24    LOVENOX TREATMENT DOSE EVALUATION    Estimated Creatinine Clearance: 22 mL/min (A) (based on SCr of 2.8 mg/dL (H)).    Recent Labs     04/26/24  0445   BUN 36*   CREATININE 2.8*      HGB 12.1*   HCT 39.6*   INR 1.7       Assessment/Plan:  Drug Interactions:   No significant interactions noted  INR subtherapeutic yesterday @1.8 at [facility].  It appears that the patient did not receive any warfarin yesterday.  HGB slightly low.  Noted CKD3 with ARF.    Continue with ordered warfarin 4mg daily tonight due to subtherapeutic INR.  Will follow INR trends and look to resume normal home dose of 3mg daily if possible.   Pharmacy will continue to monitor and adjust warfarin therapy as indicated    Thank you for the consult.  Rufus Smith RPH  4/26/2024 2:24 PM

## 2024-04-26 NOTE — PROGRESS NOTES
V2.0  [facility] [place] Note      Name:  Duarte Reyes /Age/Sex: 1935  (88 y.o. male)   MRN & CSN:  [id number] & [id number] Encounter Date/Time: 2024 12:51 PM EDT    Location:  [address] [place] [address] PCP: Jose Napoles MD       Hospital Day: 2    Assessment and Plan:   Duarte Reyes is a 88 y.o. male  who presents with JESSICA (acute kidney injury) ([facility])      Plan:    JESSICA  Patient presented with creatinine of 3.  Baseline 1.9-2.3.  Follows with nephrology: Consulted  Hold Entresto and Lasix  Received IV fluids    Systolic CHF  Not acute exacerbation.  Patient overdiuresed.  Last echo showed EF of 25%  Holding home Entresto and diuretics  Resume as appropriate and cleared by nephrology    Hypertension  Resume beta-blocker  Continue to hold Entresto and diuretic    Paroxysmal A-fib  Continue warfarin and beta-blocker    Chronic respiratory failure  At baseline.  Continue supplemental oxygen: 2 to 3 L    Legally blind    Diet ADULT DIET; Regular   DVT Prophylaxis [] Lovenox, []  Heparin, [] SCDs, [] Ambulation,    [] Eliquis, [] Xarelto  [x] Coumadin [] other   Code Status Full Code   Disposition From: Home  Expected Disposition: Home for SNF  Estimated Date of Discharge: 1 to 2 days  Patient requires continued admission due to acute kidney injury and PT OT eval   Surrogate Decision Maker/ POA      Personally reviewed Lab Studies and Imaging     Discussed management of the case with nephrology who recommended holding nephrotoxic agents including Entresto    EKG interpreted personally and results no acute ST changes    Drugs that require monitoring for toxicity include warfarin and the method of monitoring was INR monitoring    Subjective:     Chief Complaint: No chief complaint on file.       Patient with no acute complaints this morning.  Kidney function number slightly better.  Blood pressure stable.  Patient endorses that he did feel dehydrated and weak resulting in his presentation.  Feels the IV fluids  Calcium 8.0 (L) 8.3 - 10.6 MG/DL   Magnesium    Collection Time: 04/26/24  4:45 AM   Result Value Ref Range    Magnesium 2.1 1.8 - 2.4 mg/dl   Phosphorus    Collection Time: 04/26/24  4:45 AM   Result Value Ref Range    Phosphorus 4.1 2.5 - 4.9 MG/DL   Protime-INR    Collection Time: 04/26/24  4:45 AM   Result Value Ref Range    Protime 20.3 (H) 11.7 - 14.5 SECONDS    INR 1.7 INDEX        Imaging/Diagnostics Last 24 Hours   No results found.    Comment: Please note this report has been produced using speech recognition software and may contain errors related to that system including errors in grammar, punctuation, and spelling, as well as words and phrases that may be inappropriate. If there are any questions or concerns please feel free to contact the dictating provider for clarification.     Electronically signed by Maximiliano Monterroso MD on 4/26/2024 at 12:51 PM

## 2024-04-26 NOTE — PROGRESS NOTES
4 Eyes Skin Assessment     NAME:  Duarte Reyes  YOB: 1935  MEDICAL RECORD NUMBER:  [id number]    The patient is being assessed for  Admission    I agree that at least one RN has performed a thorough Head to Toe Skin Assessment on the patient. ALL assessment sites listed below have been assessed.      Areas assessed by both nurses:    Head, Face, Ears, Shoulders, Back, Chest, Arms, Elbows, Hands, Sacrum. Buttock, Coccyx, Ischium, and Legs. Feet and Heels        Does the Patient have a Wound? No noted wound(s)       James Prevention initiated by RN: Yes  Wound Care Orders initiated by RN: No    Pressure Injury (Stage 3,4, Unstageable, DTI, NWPT, and Complex wounds) if present, place Wound referral order by RN under : No    New Ostomies, if present place, Ostomy referral order under : No     Nurse 1 eSignature: Electronically signed by Reyna Salinas RN on 4/26/24 at 12:29 AM EDT    **SHARE this note so that the co-signing nurse can place an eSignature**    Nurse 2 eSignature: Electronically signed by Raymundo Rabago LPN on 4/26/24 at 5:36 AM EDT

## 2024-04-26 NOTE — CONSULTS
[facility] ACUTE CARE PHYSICAL THERAPY EVALUATION  Duarte Reyes, 1935, 4129/4129-A, 4/26/2024    History  Elem:  There were no encounter diagnoses.  Patient  has a past medical history of Chronic kidney disease, Heart abnormality, Hypertension, and UTI (urinary tract infection).  Patient  has no past surgical history on file.    Discharge Recommendation: [facility]    Equipment: none, pt owns all necessary equipment    Subjective:    Patient states:  \"[place] other [place] over by that [facility]?\"      Pain:  denies pain.      Communication with other providers:  Handoff to RN, OT    Restrictions: general precautions, fall risk, contact isolation    Home Setup/Prior level of function  Lives With: Daughter (and son-in-law)  Type of Home: House  Home Layout: One level  Home Access: Ramped entrance  Bathroom Shower/Tub:  (has been sponge bathing)  Bathroom Toilet: Standard  Bathroom Equipment: Shower chair  Home Equipment: Wheelchair-manual, Oxygen  Receives Help From: Family  ADL Assistance: Needs assistance (has a shower aide that comes 2x per week. Pt can typically manage dressing tasks on his own but family or aide help as needed. Has the most trouble with his socks. Inc time needed)  Toileting: Needs assistance  Homemaking Assistance: Needs assistance  Homemaking Responsibilities: No  Ambulation Assistance: Non-ambulatory (Jaleel with WC short distances in the home)  Transfer Assistance: Needs assistance (Only does pivot transfers. Family helps PRN with transfers. Sleeps in a recliner)  Active : No  Patient's  Info: son-in-law  Mode of Transportation: Car  Occupation: Retired  **taken from prior evaluation and confirmed with pt this date    Examination of body systems (includes body structures/functions, activity/participation limitations):  Observation:  pt supine in bed eating lunch upon arrival and agreeable to therapy  Vision:  blind, can see some shadows  Hearing:

## 2024-04-26 NOTE — PROGRESS NOTES
Occupational Therapy    [facility] ACUTE CARE OCCUPATIONAL THERAPY EVALUATION    Duarte Reyes, 1935, 4129/4129-A, 4/26/2024    Discharge Recommendation: Home with initial 24 hour supervision/assistance, Home Health OT services (S Level 3)    History:  [place]:  There were no encounter diagnoses.    Subjective:  Patient states: \"[place] is the capital city [place], correct?\"  Pain: Pt denied pain this date  Communication with other providers: PT Raina  Restrictions: General Precautions, Fall Risk, Contact Precautions, IV, Bed/chair alarm    Home Setup/Prior level of function:  Lives With: Daughter (and son-in-law)  Type of Home: House  Home Layout: One level  Home Access: Ramped entrance  Bathroom Shower/Tub: Walk-in shower  Bathroom Toilet: Standard  Bathroom Equipment: Shower chair  Home Equipment: Wheelchair-manual, Oxygen  Receives Help From: Family  ADL Assistance: Needs assistance (son-in-law assists with bathing, dressing, toileting)  Homemaking Assistance: Needs assistance  Homemaking Responsibilities: No  Ambulation Assistance: Non-ambulatory (mod I with WC short distances in the home)  Transfer Assistance: Needs assistance (Only does pivot transfers. Family helps PRN with transfers. Sleeps in a recliner)  Active : No  Patient's  Info: son-in-law  Mode of Transportation: Car  Occupation: Retired    Examination:  Observation: Supine in bed upon arrival. Pleasant and agreeable to evaluation.   Vision: Impaired (pt is legally blind, able to see some shadows)  Hearing: WFL  Vitals: Stable vitals throughout session on room air    Body Systems and functions:  ROM: WFL all joints in BL UEs  Strength: 4/5 MMT shoulder flexors and elbow flexors/extensors, 4+/5 MMT grasp in BL UEs  Sensation: WFL in BL UEs (See PT evaluation for LE assessment))  Tone: Normal  Coordination: Slightly decreased functional use of BL UEs, 2/2 visual impairments    Activities of Daily Living (ADLs):  Feeding:

## 2024-04-26 NOTE — CONSULTS
Nephrology Service Consultation    Patient:  Duarte Reyes  MRN: [id number]  Consulting physician:  Maximiliano Monterroso,*  Reason for Consult: Acute renal failure on CKD    History Obtained From:  patient, electronic medical record daughter  PCP: Jose Napoles MD    HISTORY OF PRESENT ILLNESS:   The patient is a 88 y.o. male who presents with feeling tired weak not himself brought in by family.  Apparently went to see his family doctor day prior and was told with low blood pressure and fatigue to go to the hospital for possible dehydration.  Patient has been living in [place] in the past I see him in the office I take care of his family as well he has an EF about 20 to 25% with underlying stage III kidney disease he tries to be mindful of his overall health and how to take his meds he is on diuretic therapy possibly was not drinking as much lately and family has a hard time waking him as he is unable to stand very well and in the above setting patient might of gotten dehydrated.  He does feel somewhat better today still somewhat weak and tired and renal function increased from baseline admitted to hospital for workup and therapy.  He does feel better this morning and family agrees he looks somewhat better.    Past Medical History:        Diagnosis Date    Chronic kidney disease     Heart abnormality     Hypertension     UTI (urinary tract infection)        Past Surgical History:    No past surgical history on file.    Medications:   Scheduled Meds:   warfarin  4 mg Oral Daily    sodium chloride flush  5-40 mL IntraVENous 2 times per day    finasteride  5 mg Oral Daily    pantoprazole  40 mg Oral Daily    polyethylene glycol  17 g Oral Daily    doxazosin  4 mg Oral Daily     Continuous Infusions:   sodium chloride      lactated ringers IV soln 25 mL/hr at 04/25/24 7409     PRN Meds:.sodium chloride flush, sodium chloride, ondansetron **OR** ondansetron, polyethylene glycol, acetaminophen **OR**  acetaminophen    Allergies:  Patient has no known allergies.    Social History:   TOBACCO:   reports that he has never smoked. He has never used smokeless tobacco.  ETOH:   reports no history of alcohol use.  OCCUPATION:      Family History:       Problem Relation Age of Onset    Kidney Disease Mother     Hypertension Mother     Diabetes Mother     Diabetes Granddaughter     Hypertension Daughter     Cancer Daughter        REVIEW OF SYSTEMS:  Negative except for weak tired fatigue dyspnea on exertion.    Physical Exam:    I/O: 04/25 0701 - 04/26 0700  In: -   Out: 300 [Urine:300]    Vitals: /61   Pulse 88   Temp 97.6 °F (36.4 °C) (Oral)   Resp 18   Ht 1.93 m (6' 4\")   Wt 101.6 kg (223 lb 14.4 oz)   SpO2 95%   BMI 27.25 kg/m²   General appearance: awake weak legally blind  HEENT: Head: Normal, normocephalic, atraumatic.  Neck: supple, symmetrical, trachea midline  Lungs: diminished breath sounds bilaterally  Heart: S1, S2 normal  Abdomen: abnormal findings:  soft NT  Extremities: edema trace  Neurologic: Mental status: alertness: alert      CBC:   Recent Labs     04/26/24 0445   WBC 10.8*   HGB 12.1*        BMP:    Recent Labs     04/26/24 0445      K 4.6      CO2 28   BUN 36*   CREATININE 2.8*   GLUCOSE 91     Hepatic: No results for input(s): \"AST\", \"ALT\", \"ALB\", \"BILITOT\", \"ALKPHOS\" in the last 72 hours.  Troponin: No results for input(s): \"TROPONINI\" in the last 72 hours.  BNP: No results for input(s): \"BNP\" in the last 72 hours.  Lipids: No results for input(s): \"CHOL\", \"HDL\" in the last 72 hours.    Invalid input(s): \"LDLCALCU\"  ABGs: No results found for: \"PHART\", \"PO2ART\", \"MTH4RPJ\"  INR:   Recent Labs     04/26/24 0445   INR 1.7     Renal Labs  Albumin:  No results found for: \"LABALBU\"  Calcium:    Lab Results   Component Value Date/Time    CALCIUM 8.0 04/26/2024 04:45 AM     Phosphorus:    Lab Results   Component Value Date/Time    PHOS 4.1 04/26/2024 04:45 AM     U/A:

## 2024-04-26 NOTE — CARE COORDINATION
Chart reviewed and met w/ pt and family to initiate discharge planning. CM introduced self and explained role. CM asked permission to speak in front of patient visitors, patient agreed and identified visitors as his daughter and son in law. Patient stated that he live with his daughter and son in law. Pt has PCP and insurance. Pt reports that he typically transfers himself from his lift chair to his wheelchair. Patient is also able to transfer to toilet and onto shower bench. Pt states that his legs have become weaker over the last few weeks. Pt reports that he has been to [facility] previously but he does not want to return to SNF and would prefer to return home w/ HHC if able. CM discussed various levels of therapy available - ARU, SNF or HHC. Pt and family is hopeful that patient is able to return home w/ HHC at discharge. CM explained that therapy evals are pending at this time and CM would revisit to confirm plan once notes available. Patient and family voiced understanding and agreement. CM will continue to follow.     3:29 PM Therapy evaluation noted. CM remet w/ pt and and discussed recommendation of continued therapy at home at discharge. Patient voiced understanding and is in agreement to [facility]. Per chart review, patient has had [facility] previously, patient voiced he is in agreement to use [facility] again.     Call to [facility], confidential voicemail left and referral information faxed at this time.        04/26/24 1221   Service Assessment   Patient Orientation Alert and Oriented   Cognition Alert   History Provided By Patient;Child/Family   Primary Caregiver Self   Accompanied By/Relationship children - Keon & Shasha   Support Systems Children   Patient's Healthcare Decision Maker is: Legal Next of Kin   PCP Verified by CM Yes   Prior Functional Level Assistance with the following:;Housework;Cooking;Shopping;Mobility   Can patient return to prior living arrangement Unknown at present   Ability to make needs known:

## 2024-04-27 VITALS
SYSTOLIC BLOOD PRESSURE: 150 MMHG | HEIGHT: 76 IN | DIASTOLIC BLOOD PRESSURE: 76 MMHG | RESPIRATION RATE: 18 BRPM | BODY MASS INDEX: 27.27 KG/M2 | TEMPERATURE: 97.9 F | HEART RATE: 81 BPM | WEIGHT: 223.9 LBS | OXYGEN SATURATION: 94 %

## 2024-04-27 LAB
ANION GAP SERPL CALCULATED.3IONS-SCNC: 11 MMOL/L (ref 7–16)
BUN SERPL-MCNC: 34 MG/DL (ref 6–23)
CALCIUM SERPL-MCNC: 7.2 MG/DL (ref 8.3–10.6)
CHLORIDE BLD-SCNC: 104 MMOL/L (ref 99–110)
CO2: 29 MMOL/L (ref 21–32)
CREAT SERPL-MCNC: 2.7 MG/DL (ref 0.9–1.3)
GFR SERPL CREATININE-BSD FRML MDRD: 22 ML/MIN/1.73M2
GLUCOSE SERPL-MCNC: 92 MG/DL (ref 70–99)
HCT VFR BLD CALC: 38 % (ref 42–52)
HEMOGLOBIN: 11.7 GM/DL (ref 13.5–18)
INR BLD: 1.5 INDEX
MAGNESIUM: 2.1 MG/DL (ref 1.8–2.4)
MCH RBC QN AUTO: 29.2 PG (ref 27–31)
MCHC RBC AUTO-ENTMCNC: 30.8 % (ref 32–36)
MCV RBC AUTO: 94.8 FL (ref 78–100)
PDW BLD-RTO: 13.6 % (ref 11.7–14.9)
PHOSPHORUS: 3.2 MG/DL (ref 2.5–4.9)
PLATELET # BLD: 140 K/CU MM (ref 140–440)
PMV BLD AUTO: 10.2 FL (ref 7.5–11.1)
POTASSIUM SERPL-SCNC: 4.1 MMOL/L (ref 3.5–5.1)
PROTHROMBIN TIME: 18.2 SECONDS (ref 11.7–14.5)
RBC # BLD: 4.01 M/CU MM (ref 4.6–6.2)
SODIUM BLD-SCNC: 144 MMOL/L (ref 135–145)
WBC # BLD: 9.7 K/CU MM (ref 4–10.5)

## 2024-04-27 PROCEDURE — 94761 N-INVAS EAR/PLS OXIMETRY MLT: CPT

## 2024-04-27 PROCEDURE — 80048 BASIC METABOLIC PNL TOTAL CA: CPT

## 2024-04-27 PROCEDURE — 6370000000 HC RX 637 (ALT 250 FOR IP): Performed by: STUDENT IN AN ORGANIZED HEALTH CARE EDUCATION/TRAINING PROGRAM

## 2024-04-27 PROCEDURE — 2580000003 HC RX 258: Performed by: STUDENT IN AN ORGANIZED HEALTH CARE EDUCATION/TRAINING PROGRAM

## 2024-04-27 PROCEDURE — 36415 COLL VENOUS BLD VENIPUNCTURE: CPT

## 2024-04-27 PROCEDURE — 83735 ASSAY OF MAGNESIUM: CPT

## 2024-04-27 PROCEDURE — 85027 COMPLETE CBC AUTOMATED: CPT

## 2024-04-27 PROCEDURE — 84100 ASSAY OF PHOSPHORUS: CPT

## 2024-04-27 PROCEDURE — 85610 PROTHROMBIN TIME: CPT

## 2024-04-27 RX ORDER — WARFARIN SODIUM 5 MG/1
5 TABLET ORAL
Status: DISCONTINUED | OUTPATIENT
Start: 2024-04-27 | End: 2024-04-27 | Stop reason: HOSPADM

## 2024-04-27 RX ORDER — FUROSEMIDE 40 MG/1
40 TABLET ORAL DAILY PRN
Qty: 90 TABLET | Refills: 3 | Status: SHIPPED | OUTPATIENT
Start: 2024-04-29

## 2024-04-27 RX ADMIN — CARVEDILOL 12.5 MG: 6.25 TABLET, FILM COATED ORAL at 11:19

## 2024-04-27 RX ADMIN — POLYETHYLENE GLYCOL 3350 17 G: 17 POWDER, FOR SOLUTION ORAL at 11:19

## 2024-04-27 RX ADMIN — DOXAZOSIN 4 MG: 4 TABLET ORAL at 11:19

## 2024-04-27 RX ADMIN — FINASTERIDE 5 MG: 5 TABLET, FILM COATED ORAL at 11:19

## 2024-04-27 RX ADMIN — PANTOPRAZOLE SODIUM 40 MG: 40 TABLET, DELAYED RELEASE ORAL at 11:19

## 2024-04-27 RX ADMIN — SODIUM CHLORIDE, PRESERVATIVE FREE 10 ML: 5 INJECTION INTRAVENOUS at 11:20

## 2024-04-27 NOTE — CARE COORDINATION
Pt is on discharge.  Referral was called to CM yesterday by BALAJI Luong.  LSW faxed HC order and AVS to [facility].

## 2024-04-27 NOTE — DISCHARGE INSTRUCTIONS
Please hold your Lasix through the weekend and okay to start on Monday.  We will use your Lasix as needed for days when your weight is greater than 225 pounds.      Please make an appointment to see your nephrologist for repeat labs next week.
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
## 2024-04-27 NOTE — DISCHARGE SUMMARY
V2.0  Discharge Summary    Name:  Duarte Reyes /Age/Sex: 1935 (88 y.o. male)   Admit Date: 2024  Discharge Date: 24    MRN & CSN:  [id number] & [id number] Encounter Date and Time 24 2:03 PM EDT    Attending:  Maximiliano Monterroso,* Discharging Provider: Maximiliano Monterroso MD       Hospital Course:     Brief HPI: Duarte Reyes is a 88 y.o. male with history of CHF, A-fib, and CKD who presents with weakness and feeling consistent with dehydration.  Also does have an JESSICA with a mildly elevated creatinine.  Patient denies infectious type symptoms.  Likely overdiuresed.    Brief Problem Based Course:     JESSICA  Patient presented with creatinine of 3.  Baseline 1.9-2.3.  Patient follows with nephrology and they were consulted.  He is underweight.  Patient was given IV hydration in house and will be resumed on his Entresto.  Plan will be to use Lasix 40 mg as needed for days that he weighs more than 225 pounds.  To follow-up with nephrology within the next week     Systolic CHF  Not acute exacerbation.  Patient overdiuresed.  Last echo showed EF of 25%.  Continuing his home Entresto, beta-blocker and regular follow-up    Hypertension  Continue home meds with changing diuretic to as needed     Paroxysmal A-fib  Continue warfarin and beta-blocker     Chronic respiratory failure  At baseline. Continue supplemental oxygen.     Legally blind      The patient expressed appropriate understanding of, and agreement with the discharge recommendations, medications, and plan.     Consults this admission:  IP CONSULT TO PHARMACY  IP CONSULT TO NEPHROLOGY  IP CONSULT TO HOME CARE NEEDS    Discharge Diagnosis:     JESSICA (acute kidney injury) ([facility])    Discharge Instruction:   Follow up appointments: nephrology and cardiology  Primary care physician: Jose Napoles MD within 2 weeks  Diet: regular diet   Activity: activity as tolerated  Disposition: Discharged to:   [x]Home, [x]HHC, []SNF, []Acute Rehab,  [phone] - F [phone]  [address] [facility] [address]      Phone: [phone]   furosemide 40 MG tablet        Objective Findings at Discharge:   BP (!) 150/76   Pulse 81   Temp 97.9 °F (36.6 °C) (Oral)   Resp 18   Ht 1.93 m (6' 4\")   Wt 101.6 kg (223 lb 14.4 oz)   SpO2 94%   BMI 27.25 kg/m²       Physical Exam:     Physical Exam  Constitutional:       General: He is not in acute distress.     Appearance: Normal appearance.   HENT:      Head: Normocephalic and atraumatic.      Nose: Nose normal.      Mouth/Throat:      Mouth: Mucous membranes are moist.      Pharynx: Oropharynx is clear.   Eyes:      Extraocular Movements: Extraocular movements intact.      Conjunctiva/sclera: Conjunctivae normal.      Pupils: Pupils are equal, round, and reactive to light.   Cardiovascular:      Rate and Rhythm: Normal rate and regular rhythm.      Pulses: Normal pulses.      Heart sounds: Normal heart sounds.   Pulmonary:      Effort: Pulmonary effort is normal.   Abdominal:      General: Abdomen is flat. Bowel sounds are normal.      Palpations: Abdomen is soft.   Musculoskeletal:         General: Normal range of motion.      Cervical back: Normal range of motion and neck supple.   Skin:     General: Skin is warm and dry.   Neurological:      General: No focal deficit present.      Mental Status: He is alert and oriented to person, place, and time. Mental status is at baseline.   Psychiatric:         Mood and Affect: Mood normal.         Behavior: Behavior normal.         Thought Content: Thought content normal.                Labs and Imaging   No results found.    CBC:   Recent Labs     04/26/24  0445 04/27/24  0134   WBC 10.8* 9.7   HGB 12.1* 11.7*    140     BMP:    Recent Labs     04/26/24  0445 04/27/24  0134    144   K 4.6 4.1    104   CO2 28 29   BUN 36* 34*   CREATININE 2.8* 2.7*   GLUCOSE 91 92     Hepatic: No results for input(s): \"AST\", \"ALT\", \"ALB\", \"BILITOT\", \"ALKPHOS\" in

## 2024-04-27 NOTE — PLAN OF CARE
Problem: Discharge Planning  Goal: Discharge to home or other facility with appropriate resources  4/27/2024 1510 by Shahnaz Reagan LPN  Outcome: Completed  4/27/2024 1439 by Pooja Pappas RN  Outcome: Adequate for Discharge  4/27/2024 0222 by Reyna Salinas RN  Outcome: Progressing     Problem: Skin/Tissue Integrity  Goal: Absence of new skin breakdown  Description: 1.  Monitor for areas of redness and/or skin breakdown  2.  Assess vascular access sites hourly  3.  Every 4-6 hours minimum:  Change oxygen saturation probe site  4.  Every 4-6 hours:  If on nasal continuous positive airway pressure, respiratory therapy assess nares and determine need for appliance change or resting period.  4/27/2024 1510 by Shahnaz Reagan LPN  Outcome: Completed  4/27/2024 1439 by Pooja Pappas RN  Outcome: Adequate for Discharge  4/27/2024 0222 by Reyna Salinas RN  Outcome: Progressing     Problem: ABCDS Injury Assessment  Goal: Absence of physical injury  4/27/2024 1510 by Shahnaz Reagan LPN  Outcome: Completed  4/27/2024 1439 by Pooja Pappas RN  Outcome: Adequate for Discharge  4/27/2024 0222 by Reyna Salinas RN  Outcome: Progressing     Problem: Safety - Adult  Goal: Free from fall injury  4/27/2024 1510 by Shahnaz Reagan LPN  Outcome: Completed  4/27/2024 1439 by Pooja Pappas RN  Outcome: Adequate for Discharge  4/27/2024 0222 by Reyna Salinas RN  Outcome: Progressing

## 2024-04-27 NOTE — PROGRESS NOTES
PHARMACY ANTICOAGULATION MONITORING SERVICE    Duarte Reyes is a 88 y.o. male on warfarin therapy for A Fib. Pharmacy consulted by Dr. Grover Mattson for monitoring and adjustment of treatment.    Indication for anticoagulation: A Fib  INR goal: 2-3  Warfarin dose prior to admission: 4mg daily    Pertinent Laboratory Values   Recent Labs     04/26/24  0445 04/27/24  0056 04/27/24  0134   INR 1.7 1.5  --    HGB 12.1*  --  11.7*   HCT 39.6*  --  38.0*     --  140       Assessment/Plan:  Drug Interactions: none noted  INR 1.5, subtherapeutic and trending down from yesterday; received 1 dose last night 4mg per home regimen  Will give warfarin 5mg x1 today and follow INR trend tomorrow.  Pharmacy will continue to monitor and adjust warfarin therapy as indicated    Thank you for the consult.  Shania Cruz RPH  4/27/2024 8:00 AM              Indication for anticoagulation: Atrial fibrillation  INR goal: 2-3  Warfarin dose prior to admission: 4 mg daily    Pertinent Laboratory Values:   INR = 1.8, HGB = 12.5, HCT = 37.5 and platelet count = 156 per [facility]    Assessment/Plan:  Drug Interactions:   No significant interactions noted  INR = 1.8, subtherapeutic  Will continue patient's home warfarin regimen of 4 mg daily  Pharmacy will continue to monitor and adjust warfarin therapy as indicated    Thank you for the consult.  Shania Cruz RPH  4/27/2024 7:59 AM

## 2024-04-27 NOTE — PLAN OF CARE
Problem: Discharge Planning  Goal: Discharge to home or other facility with appropriate resources  4/27/2024 0222 by Reyna Salinas RN  Outcome: Progressing  4/26/2024 1552 by Narcisa Hall LPN  Outcome: Progressing     Problem: Skin/Tissue Integrity  Goal: Absence of new skin breakdown  Description: 1.  Monitor for areas of redness and/or skin breakdown  2.  Assess vascular access sites hourly  3.  Every 4-6 hours minimum:  Change oxygen saturation probe site  4.  Every 4-6 hours:  If on nasal continuous positive airway pressure, respiratory therapy assess nares and determine need for appliance change or resting period.  4/27/2024 0222 by Reyna Salinas RN  Outcome: Progressing  4/26/2024 1552 by Narcisa Hall LPN  Outcome: Progressing     Problem: ABCDS Injury Assessment  Goal: Absence of physical injury  4/27/2024 0222 by Reyna Salinas RN  Outcome: Progressing  4/26/2024 1552 by Narcisa Hall LPN  Outcome: Progressing     Problem: Safety - Adult  Goal: Free from fall injury  4/27/2024 0222 by Reyna Salinas RN  Outcome: Progressing  4/26/2024 1552 by Narcisa Hall LPN  Outcome: Progressing

## 2024-04-29 NOTE — PROGRESS NOTES
Physician Progress Note      PATIENT:               MIGUEL URBINA  [facility] #:                  [id number]  :                       1935  ADMIT DATE:       2024 5:20 PM  DISCH DATE:        2024 3:26 PM  RESPONDING  PROVIDER #:        HUMAIRA MARIEE          QUERY TEXT:    Patient admitted with JESSICA, noted to have Paroxysmal atrial fibrillation and is   maintained on Warfarin. If possible, please document in progress notes and   discharge summary if you are evaluating and/or treating any of the following:    The medical record reflects the following:  Risk Factors: CHF, HTN, Age  Clinical Indicators:  H&P  Paroxysmal atrial fibrillation on Coumadin   which will be resumed. Discharge summary  Paroxysmal A-fib Continue   warfarin and beta-blocker.  Treatment: warfarin tablet 4 mg    Thank You,  KOLE Matthews, CDS.  Options provided:  -- Secondary hypercoagulable state in a patient with atrial fibrillation  -- Other - I will add my own diagnosis  -- Disagree - Not applicable / Not valid  -- Disagree - Clinically unable to determine / Unknown  -- Refer to Clinical Documentation Reviewer    PROVIDER RESPONSE TEXT:    This patient has secondary hypercoagulable state in a patient with atrial   fibrillation.    Query created by: Sandy Cohen on 2024 7:02 AM      Electronically signed by:  HUMAIRA MARIEE 2024 12:08 PM

## 2024-05-16 ENCOUNTER — HOSPITAL ENCOUNTER (EMERGENCY)
Age: 89
Discharge: HOME | End: 2024-05-16
Attending: STUDENT IN AN ORGANIZED HEALTH CARE EDUCATION/TRAINING PROGRAM
Payer: MEDICARE

## 2024-05-16 ENCOUNTER — APPOINTMENT (OUTPATIENT)
Dept: GENERAL RADIOLOGY | Age: 89
End: 2024-05-16
Payer: MEDICARE

## 2024-05-16 VITALS
HEART RATE: 71 BPM | SYSTOLIC BLOOD PRESSURE: 190 MMHG | RESPIRATION RATE: 19 BRPM | OXYGEN SATURATION: 95 % | TEMPERATURE: 98.4 F | DIASTOLIC BLOOD PRESSURE: 93 MMHG

## 2024-05-16 DIAGNOSIS — I50.9 ACUTE ON CHRONIC HEART FAILURE, UNSPECIFIED HEART FAILURE TYPE (HCC): Primary | ICD-10-CM

## 2024-05-16 LAB
ALBUMIN SERPL-MCNC: 3.6 GM/DL (ref 3.4–5)
ALP BLD-CCNC: 85 IU/L (ref 40–129)
ALT SERPL-CCNC: 9 U/L (ref 10–40)
ANION GAP SERPL CALCULATED.3IONS-SCNC: 13 MMOL/L (ref 7–16)
AST SERPL-CCNC: 18 IU/L (ref 15–37)
BASE EXCESS MIXED: 5.7 (ref 0–3)
BASOPHILS ABSOLUTE: 0.1 K/CU MM
BASOPHILS RELATIVE PERCENT: 0.8 % (ref 0–1)
BILIRUB SERPL-MCNC: 0.3 MG/DL (ref 0–1)
BUN SERPL-MCNC: 17 MG/DL (ref 6–23)
CALCIUM SERPL-MCNC: 7.5 MG/DL (ref 8.3–10.6)
CHLORIDE BLD-SCNC: 102 MMOL/L (ref 99–110)
CO2: 26 MMOL/L (ref 21–32)
COMMENT: ABNORMAL
CREAT SERPL-MCNC: 1.8 MG/DL (ref 0.9–1.3)
DIFFERENTIAL TYPE: ABNORMAL
EKG ATRIAL RATE: 78 BPM
EKG DIAGNOSIS: NORMAL
EKG P AXIS: 62 DEGREES
EKG Q-T INTERVAL: 418 MS
EKG QRS DURATION: 144 MS
EKG QTC CALCULATION (BAZETT): 476 MS
EKG R AXIS: 40 DEGREES
EKG T AXIS: 210 DEGREES
EKG VENTRICULAR RATE: 78 BPM
EOSINOPHILS ABSOLUTE: 0.2 K/CU MM
EOSINOPHILS RELATIVE PERCENT: 2.9 % (ref 0–3)
GFR, ESTIMATED: 36 ML/MIN/1.73M2
GLUCOSE SERPL-MCNC: 147 MG/DL (ref 70–99)
HCO3 VENOUS: 33.7 MMOL/L (ref 22–29)
HCT VFR BLD CALC: 39 % (ref 42–52)
HEMOGLOBIN: 11.9 GM/DL (ref 13.5–18)
IMMATURE NEUTROPHIL %: 0.3 % (ref 0–0.43)
LYMPHOCYTES ABSOLUTE: 1.9 K/CU MM
LYMPHOCYTES RELATIVE PERCENT: 23.6 % (ref 24–44)
MAGNESIUM: 1.8 MG/DL (ref 1.8–2.4)
MCH RBC QN AUTO: 29 PG (ref 27–31)
MCHC RBC AUTO-ENTMCNC: 30.5 % (ref 32–36)
MCV RBC AUTO: 94.9 FL (ref 78–100)
MONOCYTES ABSOLUTE: 1.2 K/CU MM
MONOCYTES RELATIVE PERCENT: 14.9 % (ref 0–4)
NEUTROPHILS ABSOLUTE: 4.6 K/CU MM
NEUTROPHILS RELATIVE PERCENT: 57.5 % (ref 36–66)
NUCLEATED RBC %: 0 %
O2 SAT, VEN: 90.4 % (ref 50–70)
PCO2, VEN: 64 MMHG (ref 41–51)
PDW BLD-RTO: 14.4 % (ref 11.7–14.9)
PH VENOUS: 7.33 (ref 7.32–7.43)
PLATELET # BLD: 151 K/CU MM (ref 140–440)
PMV BLD AUTO: 9.8 FL (ref 7.5–11.1)
PO2, VEN: 69 MMHG (ref 28–48)
POTASSIUM SERPL-SCNC: 4.8 MMOL/L (ref 3.5–5.1)
PRO-BNP: 6558 PG/ML
RBC # BLD: 4.11 M/CU MM (ref 4.6–6.2)
REASON FOR REJECTION: NORMAL
REJECTED TEST: NORMAL
SODIUM BLD-SCNC: 141 MMOL/L (ref 135–145)
TOTAL IMMATURE NEUTOROPHIL: 0.02 K/CU MM
TOTAL NUCLEATED RBC: 0 K/CU MM
TOTAL PROTEIN: 6.5 GM/DL (ref 6.4–8.2)
TROPONIN, HIGH SENSITIVITY: 25 NG/L (ref 0–22)
TROPONIN, HIGH SENSITIVITY: 25 NG/L (ref 0–22)
TROPONIN, HIGH SENSITIVITY: 27 NG/L (ref 0–22)
WBC # BLD: 8 K/CU MM (ref 4–10.5)

## 2024-05-16 PROCEDURE — 94640 AIRWAY INHALATION TREATMENT: CPT

## 2024-05-16 PROCEDURE — 80053 COMPREHEN METABOLIC PANEL: CPT

## 2024-05-16 PROCEDURE — 93010 ELECTROCARDIOGRAM REPORT: CPT | Performed by: INTERNAL MEDICINE

## 2024-05-16 PROCEDURE — 99285 EMERGENCY DEPT VISIT HI MDM: CPT

## 2024-05-16 PROCEDURE — 93005 ELECTROCARDIOGRAM TRACING: CPT | Performed by: STUDENT IN AN ORGANIZED HEALTH CARE EDUCATION/TRAINING PROGRAM

## 2024-05-16 PROCEDURE — 83880 ASSAY OF NATRIURETIC PEPTIDE: CPT

## 2024-05-16 PROCEDURE — 85025 COMPLETE CBC W/AUTO DIFF WBC: CPT

## 2024-05-16 PROCEDURE — 71045 X-RAY EXAM CHEST 1 VIEW: CPT

## 2024-05-16 PROCEDURE — 82805 BLOOD GASES W/O2 SATURATION: CPT

## 2024-05-16 PROCEDURE — 6370000000 HC RX 637 (ALT 250 FOR IP): Performed by: STUDENT IN AN ORGANIZED HEALTH CARE EDUCATION/TRAINING PROGRAM

## 2024-05-16 PROCEDURE — 84484 ASSAY OF TROPONIN QUANT: CPT

## 2024-05-16 PROCEDURE — 83735 ASSAY OF MAGNESIUM: CPT

## 2024-05-16 RX ORDER — IPRATROPIUM BROMIDE AND ALBUTEROL SULFATE 2.5; .5 MG/3ML; MG/3ML
1 SOLUTION RESPIRATORY (INHALATION) ONCE
Status: COMPLETED | OUTPATIENT
Start: 2024-05-16 | End: 2024-05-16

## 2024-05-16 RX ADMIN — IPRATROPIUM BROMIDE AND ALBUTEROL SULFATE 1 DOSE: .5; 2.5 SOLUTION RESPIRATORY (INHALATION) at 12:55

## 2024-05-16 ASSESSMENT — PAIN - FUNCTIONAL ASSESSMENT
PAIN_FUNCTIONAL_ASSESSMENT: NONE - DENIES PAIN
PAIN_FUNCTIONAL_ASSESSMENT: NONE - DENIES PAIN

## 2024-05-16 ASSESSMENT — PAIN SCALES - GENERAL: PAINLEVEL_OUTOF10: 0

## 2024-05-16 NOTE — ED TRIAGE NOTES
Patient to the ED with complaints of fatigue for the last 3 days. Patient states that over the last 3 days he has had an increase in congestion, sore throat, and cough but today he started to be so fatigued that he was unable to transfer himself as normal. Patient reports that he is usually wheelchair bound at home, is blind. Denies any sick contacts when asked.

## 2024-05-16 NOTE — ED NOTES
Lab called at this time and stated the second troponin was hemolyzed. This RN requested for a phlebotomist to draw the second troponin as three RN's have attempted to draw blood

## 2024-05-16 NOTE — ED PROVIDER NOTES
Emergency Department Encounter    Patient: Duarte Reyes  MRN: [id number]  : 1935  Date of Evaluation: 2024  ED Provider:  Ramsey Duarte MD    Triage Chief Complaint:   Fatigue (Patient reports recent respiratory illness and starting today has felt more fatigued than normal. Patient reports that he has \"coughed all night\" ) and Cough    [place]:  Duarte Reyes is a 88 y.o. male with past medical history of paroxysmal A-fib on warfarin, chronic respiratory failure on supplemental oxygen, systolic heart failure, blindness, CKD that presents with worsening fatigue and cough.  Patient reports has been having shortness of breath, congestion, coughing for weeks, however has been worse for the last couple of days.  He reports that he has shortness of breath with orthopnea, and that he has been having more difficulty transferring out of his wheelchair.  He reports that the cough is productive which is mildly worse, but no new.  He also reports some nausea this morning without emesis that improved when he took Zofran at home.  Patient denies any fevers, chills, chest pain, abdominal pain, x-ray lower extremity edema, dysuria, hematuria, constipation, diarrhea, sick contacts.    Of note, patient was recommended to stop Lasix after last admission 2 weeks ago due to JESSICA.  He did take 1 dose last night.      ROS - see HPI    Past Medical History:   Diagnosis Date    Chronic kidney disease     Heart abnormality     Hypertension     UTI (urinary tract infection)      History reviewed. No pertinent surgical history.  Family History   Problem Relation Age of Onset    Kidney Disease Mother     Hypertension Mother     Diabetes Mother     Diabetes Granddaughter     Hypertension Daughter     Cancer Daughter      Social History     Socioeconomic History    Marital status: Single     Spouse name: Not on file    Number of children: Not on file    Years of education: Not on file    Highest education level: Not on file

## 2024-05-16 NOTE — ED NOTES
The patient is resting in bed with family at the bedside, has been updated on the plan of care, is not in acute distress, has a call light within reach, and denies any needs at this time

## 2024-05-16 NOTE — ED NOTES
This RN took report from Laura ARAUZ at 1121. The patient is resting in bed, is not in acute distress, has a call light within reach, and denies any needs at this time

## 2024-05-16 NOTE — ED NOTES
The patient is resting in bed, is watching tv, is not in acute distress, has a call light within reach, has family at the bedside, and denies any other needs at this time

## 2024-05-16 NOTE — ED NOTES
Patient taken off of bedpan at this time, with 75cc of urine noted. Patient cleaned up without difficulty.

## 2024-05-16 NOTE — DISCHARGE INSTRUCTIONS
-Start taking Lasix 40 mg tablet once a day  -Follow-up with your primary care doctor in the next 2 to 3 days to recheck your kidneys and to reevaluate you  -Come back to emergency department if you develop severe shortness of breath, severe chest pain, if they are unable to take care of you at home, or if you are concerned

## 2024-05-16 NOTE — ED NOTES
Patient is resting in bed, has daughter and son and law at the bedside, is not in any acute distress, has a call light within reach, and denies any other needs at this time

## 2024-05-16 NOTE — ED NOTES
Patient is resting in bed, with family at the bedside, is not in any acute distress, has a call light within reach, and denies any other needs at this time

## 2024-06-04 ENCOUNTER — OFFICE VISIT (OUTPATIENT)
Dept: CARDIOLOGY CLINIC | Age: 89
End: 2024-06-04
Payer: MEDICARE

## 2024-06-04 VITALS
SYSTOLIC BLOOD PRESSURE: 112 MMHG | BODY MASS INDEX: 26.55 KG/M2 | HEART RATE: 89 BPM | HEIGHT: 77 IN | OXYGEN SATURATION: 89 % | DIASTOLIC BLOOD PRESSURE: 60 MMHG

## 2024-06-04 DIAGNOSIS — I44.7 LBBB (LEFT BUNDLE BRANCH BLOCK): ICD-10-CM

## 2024-06-04 DIAGNOSIS — N18.4 CKD (CHRONIC KIDNEY DISEASE) STAGE 4, GFR 15-29 ML/MIN (HCC): ICD-10-CM

## 2024-06-04 DIAGNOSIS — I10 HYPERTENSION, UNSPECIFIED TYPE: ICD-10-CM

## 2024-06-04 DIAGNOSIS — N18.32 STAGE 3B CHRONIC KIDNEY DISEASE (HCC): ICD-10-CM

## 2024-06-04 DIAGNOSIS — Z09 HOSPITAL DISCHARGE FOLLOW-UP: ICD-10-CM

## 2024-06-04 DIAGNOSIS — I25.5 ISCHEMIC CARDIOMYOPATHY: ICD-10-CM

## 2024-06-04 DIAGNOSIS — I42.9 CARDIOMYOPATHY, UNSPECIFIED TYPE (HCC): ICD-10-CM

## 2024-06-04 DIAGNOSIS — I50.22 CHRONIC SYSTOLIC (CONGESTIVE) HEART FAILURE (HCC): ICD-10-CM

## 2024-06-04 DIAGNOSIS — I48.0 PAROXYSMAL ATRIAL FIBRILLATION (HCC): Primary | ICD-10-CM

## 2024-06-04 DIAGNOSIS — R07.2 PRECORDIAL PAIN: ICD-10-CM

## 2024-06-04 DIAGNOSIS — I50.33 ACUTE ON CHRONIC DIASTOLIC CONGESTIVE HEART FAILURE (HCC): ICD-10-CM

## 2024-06-04 PROCEDURE — G8427 DOCREV CUR MEDS BY ELIG CLIN: HCPCS | Performed by: INTERNAL MEDICINE

## 2024-06-04 PROCEDURE — 99214 OFFICE O/P EST MOD 30 MIN: CPT | Performed by: INTERNAL MEDICINE

## 2024-06-04 PROCEDURE — 1111F DSCHRG MED/CURRENT MED MERGE: CPT | Performed by: INTERNAL MEDICINE

## 2024-06-04 PROCEDURE — 1036F TOBACCO NON-USER: CPT | Performed by: INTERNAL MEDICINE

## 2024-06-04 PROCEDURE — G8417 CALC BMI ABV UP PARAM F/U: HCPCS | Performed by: INTERNAL MEDICINE

## 2024-06-04 PROCEDURE — 1123F ACP DISCUSS/DSCN MKR DOCD: CPT | Performed by: INTERNAL MEDICINE

## 2024-06-04 RX ORDER — FUROSEMIDE 40 MG/1
40 TABLET ORAL 2 TIMES DAILY
Qty: 90 TABLET | Refills: 1 | Status: SHIPPED | OUTPATIENT
Start: 2024-06-04

## 2024-06-04 RX ORDER — AZITHROMYCIN 250 MG/1
TABLET, FILM COATED ORAL
Qty: 6 TABLET | Refills: 0 | Status: SHIPPED | OUTPATIENT
Start: 2024-06-04 | End: 2024-06-14

## 2024-06-04 RX ORDER — METHYLPREDNISOLONE 4 MG/1
TABLET ORAL
Qty: 1 KIT | Refills: 0 | Status: SHIPPED | OUTPATIENT
Start: 2024-06-04 | End: 2024-06-10

## 2024-06-04 NOTE — PROGRESS NOTES
CARDIOLOGY  NOTE    Chief Complaint: SOB/ afib/ LBBB    HPI:   Duarte is a 88 y.o. year old who has Past medical history as noted below.  Very pleasant gentleman comes in with his daughter and son-in-law he is moved to [place] from [place].  He is wheelchair-bound mostly because he is legally blind.  He has  cardiomyopathy with ejection  fraction of 25% he did see nephrology   for creatinine of 2.1  With MUGA scan his EF has improved to 48% by May 2023   He got admitted ion April 2024 with renal failure and dehydrated so lasix was stopped  In last few weeks he has gotten more sob and he is not able to lie flat he is consatntly coughing and bringing up Phlegm  He was holding lasix till last week when he restarted it his o2 sats are down to 89 %   On coreg and entresto after getting cleared from nephrology      Reports that he has been Sleeping in lounge chair, once in awhile gest chets pain, belching helps .  His echo showed severe daily reduced EF of about 25%  in Feb 2023 with wall motion abnormality stress test shows large anterior septal defect with no ischemia   EKG shows left bundle branch block.  Hehas history of A. fib he is on Coumadin but really had echo and stress test in [place] sometime back and used to see cardiology over there . Have history of chronic kidney disease with creatinine 2.2 range.    Current Outpatient Medications   Medication Sig Dispense Refill    furosemide (LASIX) 40 MG tablet Take 1 tablet by mouth 2 times daily 90 tablet 1    methylPREDNISolone (MEDROL, LATESHA,) 4 MG tablet By mouth. 1 kit 0    azithromycin (ZITHROMAX) 250 MG tablet 500mg on day 1 followed by 250mg on days 2 - 5 6 tablet 0    furosemide (LASIX) 40 MG tablet Take 1 tablet by mouth daily as needed (for weight >225) 90 tablet 3    carvedilol (COREG) 12.5 MG tablet Take 1 tablet by mouth 2 times daily (with meals) 180 tablet 3    pantoprazole (PROTONIX) 40 MG tablet Take 1 tablet by

## 2024-06-14 ENCOUNTER — HOSPITAL ENCOUNTER (OUTPATIENT)
Age: 89
Discharge: HOME | End: 2024-06-14
Payer: MEDICARE

## 2024-06-14 DIAGNOSIS — I25.5 ISCHEMIC CARDIOMYOPATHY: ICD-10-CM

## 2024-06-14 DIAGNOSIS — R07.2 PRECORDIAL PAIN: ICD-10-CM

## 2024-06-14 DIAGNOSIS — N18.4 CKD (CHRONIC KIDNEY DISEASE) STAGE 4, GFR 15-29 ML/MIN (HCC): ICD-10-CM

## 2024-06-14 DIAGNOSIS — I10 HYPERTENSION, UNSPECIFIED TYPE: ICD-10-CM

## 2024-06-14 DIAGNOSIS — N18.32 STAGE 3B CHRONIC KIDNEY DISEASE (HCC): ICD-10-CM

## 2024-06-14 DIAGNOSIS — I48.0 PAROXYSMAL ATRIAL FIBRILLATION (HCC): ICD-10-CM

## 2024-06-14 DIAGNOSIS — I42.9 CARDIOMYOPATHY, UNSPECIFIED TYPE (HCC): ICD-10-CM

## 2024-06-14 DIAGNOSIS — I50.33 ACUTE ON CHRONIC DIASTOLIC CONGESTIVE HEART FAILURE (HCC): ICD-10-CM

## 2024-06-14 DIAGNOSIS — I44.7 LBBB (LEFT BUNDLE BRANCH BLOCK): ICD-10-CM

## 2024-06-14 DIAGNOSIS — I50.22 CHRONIC SYSTOLIC (CONGESTIVE) HEART FAILURE (HCC): ICD-10-CM

## 2024-06-14 LAB
ANION GAP SERPL CALCULATED.3IONS-SCNC: 13 MMOL/L (ref 7–16)
BUN SERPL-MCNC: 60 MG/DL (ref 6–23)
CALCIUM SERPL-MCNC: 9.6 MG/DL (ref 8.3–10.6)
CHLORIDE BLD-SCNC: 94 MMOL/L (ref 99–110)
CO2: 34 MMOL/L (ref 21–32)
CREAT SERPL-MCNC: 3 MG/DL (ref 0.9–1.3)
GFR, ESTIMATED: 19 ML/MIN/1.73M2
GLUCOSE SERPL-MCNC: 123 MG/DL (ref 70–99)
POTASSIUM SERPL-SCNC: 5 MMOL/L (ref 3.5–5.1)
PRO-BNP: 1127 PG/ML
SODIUM BLD-SCNC: 141 MMOL/L (ref 135–145)

## 2024-06-14 PROCEDURE — 83880 ASSAY OF NATRIURETIC PEPTIDE: CPT

## 2024-06-14 PROCEDURE — 80048 BASIC METABOLIC PNL TOTAL CA: CPT

## 2024-06-14 PROCEDURE — 36415 COLL VENOUS BLD VENIPUNCTURE: CPT

## 2024-06-18 ENCOUNTER — TELEPHONE (OUTPATIENT)
Dept: CARDIOLOGY CLINIC | Age: 89
End: 2024-06-18

## 2024-06-18 NOTE — TELEPHONE ENCOUNTER
ECHO:   Left Ventricle: Reduced left ventricular systolic function with a visually estimated EF of 25 - 30%. Left ventricle size is normal. Mildly increased wall thickness. Septal motion is consistent with bundle branch block. Global hypokinesis present. Grade II diastolic dysfunction with increased LAP.    Aortic Valve: Moderate regurgitation. AV PHT is 420.0 ms.    Mitral Valve: Mild regurgitation.    Tricuspid Valve: Physiologically normal regurgitation. The estimated RVSP is 23 mmHg.    Pericardium: No pericardial effusion.    Image quality is technically difficult. Technically difficult study due to patient's body habitus.    OV TO DISCUSS LOW EF    SPOKE WITH PT'S DAUGHTER REGARDING ECHO , VOICED UNDERSTANDING.

## 2024-07-09 ENCOUNTER — HOSPITAL ENCOUNTER (OUTPATIENT)
Age: 89
Discharge: HOME | End: 2024-07-09
Payer: MEDICARE

## 2024-07-09 ENCOUNTER — OFFICE VISIT (OUTPATIENT)
Dept: CARDIOLOGY CLINIC | Age: 89
End: 2024-07-09
Payer: MEDICARE

## 2024-07-09 VITALS
BODY MASS INDEX: 27.16 KG/M2 | WEIGHT: 223 LBS | HEIGHT: 76 IN | DIASTOLIC BLOOD PRESSURE: 62 MMHG | OXYGEN SATURATION: 99 % | HEART RATE: 75 BPM | SYSTOLIC BLOOD PRESSURE: 102 MMHG

## 2024-07-09 DIAGNOSIS — N18.4 CKD (CHRONIC KIDNEY DISEASE) STAGE 4, GFR 15-29 ML/MIN (HCC): ICD-10-CM

## 2024-07-09 DIAGNOSIS — I25.5 ISCHEMIC CARDIOMYOPATHY: ICD-10-CM

## 2024-07-09 DIAGNOSIS — I48.0 PAROXYSMAL ATRIAL FIBRILLATION (HCC): ICD-10-CM

## 2024-07-09 DIAGNOSIS — I50.33 ACUTE ON CHRONIC DIASTOLIC CONGESTIVE HEART FAILURE (HCC): ICD-10-CM

## 2024-07-09 DIAGNOSIS — N18.31 STAGE 3A CHRONIC KIDNEY DISEASE (HCC): ICD-10-CM

## 2024-07-09 DIAGNOSIS — D68.69 HYPERCOAGULABLE STATE DUE TO PAROXYSMAL ATRIAL FIBRILLATION (HCC): ICD-10-CM

## 2024-07-09 DIAGNOSIS — H54.8 LEGALLY BLIND: ICD-10-CM

## 2024-07-09 DIAGNOSIS — I48.0 PAROXYSMAL ATRIAL FIBRILLATION (HCC): Primary | ICD-10-CM

## 2024-07-09 DIAGNOSIS — N17.9 AKI (ACUTE KIDNEY INJURY) (HCC): ICD-10-CM

## 2024-07-09 DIAGNOSIS — N40.0 BENIGN PROSTATIC HYPERPLASIA WITHOUT LOWER URINARY TRACT SYMPTOMS: ICD-10-CM

## 2024-07-09 DIAGNOSIS — I10 HYPERTENSION, UNSPECIFIED TYPE: ICD-10-CM

## 2024-07-09 DIAGNOSIS — I48.0 HYPERCOAGULABLE STATE DUE TO PAROXYSMAL ATRIAL FIBRILLATION (HCC): ICD-10-CM

## 2024-07-09 DIAGNOSIS — I44.7 LBBB (LEFT BUNDLE BRANCH BLOCK): ICD-10-CM

## 2024-07-09 LAB
ALBUMIN SERPL-MCNC: 3.6 GM/DL (ref 3.4–5)
ANION GAP SERPL CALCULATED.3IONS-SCNC: 15 MMOL/L (ref 7–16)
BASOPHILS ABSOLUTE: 0.1 K/CU MM
BASOPHILS RELATIVE PERCENT: 0.7 % (ref 0–1)
BUN SERPL-MCNC: 37 MG/DL (ref 6–23)
CALCIUM SERPL-MCNC: 8.6 MG/DL (ref 8.3–10.6)
CHLORIDE BLD-SCNC: 99 MMOL/L (ref 99–110)
CO2: 27 MMOL/L (ref 21–32)
CREAT SERPL-MCNC: 2.4 MG/DL (ref 0.9–1.3)
DIFFERENTIAL TYPE: ABNORMAL
EOSINOPHILS ABSOLUTE: 0.3 K/CU MM
EOSINOPHILS RELATIVE PERCENT: 3.3 % (ref 0–3)
GFR, ESTIMATED: 25 ML/MIN/1.73M2
GLUCOSE SERPL-MCNC: 105 MG/DL (ref 70–99)
HCT VFR BLD CALC: 37.7 % (ref 42–52)
HEMOGLOBIN: 11.6 GM/DL (ref 13.5–18)
IMMATURE NEUTROPHIL %: 0.6 % (ref 0–0.43)
LYMPHOCYTES ABSOLUTE: 2.6 K/CU MM
LYMPHOCYTES RELATIVE PERCENT: 28.3 % (ref 24–44)
MCH RBC QN AUTO: 29.1 PG (ref 27–31)
MCHC RBC AUTO-ENTMCNC: 30.8 % (ref 32–36)
MCV RBC AUTO: 94.7 FL (ref 78–100)
MONOCYTES ABSOLUTE: 1 K/CU MM
MONOCYTES RELATIVE PERCENT: 11.1 % (ref 0–4)
NEUTROPHILS ABSOLUTE: 5.1 K/CU MM
NEUTROPHILS RELATIVE PERCENT: 56 % (ref 36–66)
NUCLEATED RBC %: 0 %
PDW BLD-RTO: 14.1 % (ref 11.7–14.9)
PHOSPHORUS: 4.2 MG/DL (ref 2.5–4.9)
PLATELET # BLD: 170 K/CU MM (ref 140–440)
PMV BLD AUTO: 10 FL (ref 7.5–11.1)
POTASSIUM SERPL-SCNC: 4.8 MMOL/L (ref 3.5–5.1)
RBC # BLD: 3.98 M/CU MM (ref 4.6–6.2)
SODIUM BLD-SCNC: 141 MMOL/L (ref 135–145)
TOTAL IMMATURE NEUTOROPHIL: 0.05 K/CU MM
TOTAL NUCLEATED RBC: 0 K/CU MM
WBC # BLD: 9.1 K/CU MM (ref 4–10.5)

## 2024-07-09 PROCEDURE — 36415 COLL VENOUS BLD VENIPUNCTURE: CPT

## 2024-07-09 PROCEDURE — 82040 ASSAY OF SERUM ALBUMIN: CPT

## 2024-07-09 PROCEDURE — 1123F ACP DISCUSS/DSCN MKR DOCD: CPT | Performed by: INTERNAL MEDICINE

## 2024-07-09 PROCEDURE — 84100 ASSAY OF PHOSPHORUS: CPT

## 2024-07-09 PROCEDURE — 1036F TOBACCO NON-USER: CPT | Performed by: INTERNAL MEDICINE

## 2024-07-09 PROCEDURE — 99214 OFFICE O/P EST MOD 30 MIN: CPT | Performed by: INTERNAL MEDICINE

## 2024-07-09 PROCEDURE — 85025 COMPLETE CBC W/AUTO DIFF WBC: CPT

## 2024-07-09 PROCEDURE — G8427 DOCREV CUR MEDS BY ELIG CLIN: HCPCS | Performed by: INTERNAL MEDICINE

## 2024-07-09 PROCEDURE — 80048 BASIC METABOLIC PNL TOTAL CA: CPT

## 2024-07-09 PROCEDURE — G8417 CALC BMI ABV UP PARAM F/U: HCPCS | Performed by: INTERNAL MEDICINE

## 2024-07-09 PROCEDURE — 82570 ASSAY OF URINE CREATININE: CPT

## 2024-07-09 PROCEDURE — 81003 URINALYSIS AUTO W/O SCOPE: CPT

## 2024-07-09 PROCEDURE — 84156 ASSAY OF PROTEIN URINE: CPT

## 2024-07-09 RX ORDER — DAPAGLIFLOZIN 10 MG/1
10 TABLET, FILM COATED ORAL EVERY MORNING
Qty: 90 TABLET | Refills: 0 | Status: SHIPPED | OUTPATIENT
Start: 2024-07-09

## 2024-07-09 NOTE — PROGRESS NOTES
report has been partially produced using speech recognition software and may contain errors related to that system including errors in grammar, punctuation, and spelling, as well as words and phrases that may be inappropriate. If there are any questions or concerns please feel free to contact the dictating provider for clarification.

## 2024-07-09 NOTE — PATIENT INSTRUCTIONS
**It is YOUR responsibilty to bring medication bottles and/or updated medication list to EACH APPOINTMENT. This will allow us to better serve you and all your healthcare needs**   Thank you for allowing us to care for you today!   We want to ensure we can follow your treatment plan and we strive to give you the best outcomes and experience possible.   If you ever have a life threatening emergency and call 911 - for an ambulance (EMS)   Our providers can only care for you at:   [facility] or [facility].   Even if you have someone take you or you drive yourself we can only care for you in a [facility]. Our providers are not setup at the other healthcare locations!   Please be informed that if you contact our office outside of normal business hours the physician on call cannot help with any scheduling or rescheduling issues, procedure instruction questions or any type of medication issue.    We advise you for any urgent/emergency that you go to the nearest emergency room!    PLEASE CALL OUR OFFICE DURING NORMAL BUSINESS HOURS    Monday - Friday   8 am to 5 pm    [place]: [phone]    [place]: [phone]    [place]:  [phone]  We are committed to providing you the best care possible.    If you receive a survey after visiting one of our offices, please take time to share your experience concerning your physician office visit.  These surveys are confidential and no health information about you is shared.    We are eager to improve for you and we are counting on your feedback to help make that happen.

## 2024-07-15 ENCOUNTER — HOSPITAL ENCOUNTER (OUTPATIENT)
Age: 89
Setting detail: SPECIMEN
Discharge: HOME | End: 2024-07-15
Payer: MEDICARE

## 2024-07-15 PROCEDURE — 82570 ASSAY OF URINE CREATININE: CPT

## 2024-07-15 PROCEDURE — 81003 URINALYSIS AUTO W/O SCOPE: CPT

## 2024-07-15 PROCEDURE — 84156 ASSAY OF PROTEIN URINE: CPT

## 2024-07-16 LAB
BILIRUBIN, URINE: NEGATIVE MG/DL
BLOOD, URINE: NEGATIVE
CLARITY, UA: CLEAR
COLOR, UA: YELLOW
COMMENT UA: ABNORMAL
CREATININE URINE: 93.1 MG/DL (ref 39–259)
GLUCOSE URINE: >1000 MG/DL
KETONES, URINE: NEGATIVE MG/DL
LEUKOCYTE ESTERASE, URINE: NEGATIVE
NITRITE URINE, QUANTITATIVE: NEGATIVE
PH, URINE: 8 (ref 5–8)
PROT/CREAT RATIO, UR: 0.1
PROTEIN UA: NEGATIVE MG/DL
SPECIFIC GRAVITY UA: 1.01 (ref 1–1.03)
URINE TOTAL PROTEIN: 9.3 MG/DL
UROBILINOGEN, URINE: 1 MG/DL (ref 0.2–1)

## 2024-07-29 RX ORDER — FUROSEMIDE 40 MG/1
40 TABLET ORAL DAILY
Qty: 60 TABLET | Refills: 5 | Status: SHIPPED | OUTPATIENT
Start: 2024-07-29

## 2024-07-31 ENCOUNTER — TELEPHONE (OUTPATIENT)
Dept: CARDIOLOGY CLINIC | Age: 89
End: 2024-07-31

## 2024-09-09 ENCOUNTER — HOSPITAL ENCOUNTER (OUTPATIENT)
Age: 89
Discharge: HOME | End: 2024-09-09
Payer: MEDICARE

## 2024-09-09 DIAGNOSIS — N18.4 CKD (CHRONIC KIDNEY DISEASE) STAGE 4, GFR 15-29 ML/MIN (HCC): ICD-10-CM

## 2024-09-09 DIAGNOSIS — I50.22 CHRONIC SYSTOLIC (CONGESTIVE) HEART FAILURE (HCC): ICD-10-CM

## 2024-09-09 DIAGNOSIS — I10 HYPERTENSION, UNSPECIFIED TYPE: ICD-10-CM

## 2024-09-09 DIAGNOSIS — I48.0 PAROXYSMAL ATRIAL FIBRILLATION (HCC): ICD-10-CM

## 2024-09-09 DIAGNOSIS — N40.0 BENIGN PROSTATIC HYPERPLASIA WITHOUT LOWER URINARY TRACT SYMPTOMS: ICD-10-CM

## 2024-09-09 LAB
BASOPHILS # BLD: 0.05 K/UL
BASOPHILS NFR BLD: 1 % (ref 0–1)
EOSINOPHIL # BLD: 0.28 K/UL
EOSINOPHILS RELATIVE PERCENT: 3 % (ref 0–3)
ERYTHROCYTE [DISTWIDTH] IN BLOOD BY AUTOMATED COUNT: 14.3 % (ref 11.7–14.9)
HCT VFR BLD AUTO: 38.3 % (ref 42–52)
HGB BLD-MCNC: 11.3 G/DL (ref 13.5–18)
IMM GRANULOCYTES # BLD AUTO: 0.05 K/UL
IMM GRANULOCYTES NFR BLD: 1 %
LYMPHOCYTES NFR BLD: 1.5 K/UL
LYMPHOCYTES RELATIVE PERCENT: 14 % (ref 24–44)
MCH RBC QN AUTO: 28.6 PG (ref 27–31)
MCHC RBC AUTO-ENTMCNC: 29.5 G/DL (ref 32–36)
MCV RBC AUTO: 97 FL (ref 78–100)
MONOCYTES NFR BLD: 1.28 K/UL
MONOCYTES NFR BLD: 12 % (ref 0–4)
NEUTROPHILS NFR BLD: 72 % (ref 36–66)
NEUTS SEG NFR BLD: 7.94 K/UL
PLATELET # BLD AUTO: 161 K/UL (ref 140–440)
PMV BLD AUTO: 10.3 FL (ref 7.5–11.1)
RBC # BLD AUTO: 3.95 M/UL (ref 4.6–6.2)
WBC OTHER # BLD: 11.1 K/UL (ref 4–10.5)

## 2024-09-09 PROCEDURE — 80048 BASIC METABOLIC PNL TOTAL CA: CPT

## 2024-09-09 PROCEDURE — 84100 ASSAY OF PHOSPHORUS: CPT

## 2024-09-09 PROCEDURE — 85025 COMPLETE CBC W/AUTO DIFF WBC: CPT

## 2024-09-09 PROCEDURE — 36415 COLL VENOUS BLD VENIPUNCTURE: CPT

## 2024-09-09 PROCEDURE — 82040 ASSAY OF SERUM ALBUMIN: CPT

## 2024-09-10 LAB
ALBUMIN SERPL-MCNC: 3.6 G/DL (ref 3.4–5)
ANION GAP SERPL CALCULATED.3IONS-SCNC: 19 MMOL/L (ref 9–17)
BUN SERPL-MCNC: 31 MG/DL (ref 7–20)
CALCIUM SERPL-MCNC: 7.7 MG/DL (ref 8.3–10.6)
CHLORIDE SERPL-SCNC: 96 MMOL/L (ref 99–110)
CO2 SERPL-SCNC: 26 MMOL/L (ref 21–32)
CREAT SERPL-MCNC: 2.8 MG/DL (ref 0.8–1.3)
GFR, ESTIMATED: 21 ML/MIN/1.73M2
GLUCOSE SERPL-MCNC: 143 MG/DL (ref 74–99)
PHOSPHATE SERPL-MCNC: 2.5 MG/DL (ref 2.5–4.9)
POTASSIUM SERPL-SCNC: 3.9 MMOL/L (ref 3.5–5.1)
SODIUM SERPL-SCNC: 141 MMOL/L (ref 136–145)

## 2024-09-11 ENCOUNTER — HOSPITAL ENCOUNTER (OUTPATIENT)
Age: 89
Setting detail: SPECIMEN
Discharge: HOME | End: 2024-09-11
Payer: MEDICARE

## 2024-09-11 LAB
BILIRUB UR QL STRIP: NEGATIVE
CLARITY UR: CLEAR
COLOR UR: YELLOW
COMMENT: ABNORMAL
GLUCOSE UR STRIP-MCNC: 500 MG/DL
HGB UR QL STRIP.AUTO: NEGATIVE
KETONES UR STRIP-MCNC: NEGATIVE MG/DL
LEUKOCYTE ESTERASE UR QL STRIP: NEGATIVE
NITRITE UR QL STRIP: NEGATIVE
PH UR STRIP: 6 [PH] (ref 5–8)
PROT UR STRIP-MCNC: NEGATIVE MG/DL
SP GR UR STRIP: 1.01 (ref 1–1.03)
UROBILINOGEN UR STRIP-ACNC: 1 EU/DL (ref 0–1)

## 2024-09-11 PROCEDURE — 81003 URINALYSIS AUTO W/O SCOPE: CPT

## 2024-09-11 PROCEDURE — 84156 ASSAY OF PROTEIN URINE: CPT

## 2024-09-11 PROCEDURE — 82570 ASSAY OF URINE CREATININE: CPT

## 2024-09-13 LAB
CREAT UR-MCNC: 134 MG/DL (ref 39–259)
TOTAL PROTEIN, URINE: 20 MG/DL
URINE TOTAL PROTEIN CREATININE RATIO: 0.15 (ref 0–0.2)

## 2024-09-30 RX ORDER — DAPAGLIFLOZIN 10 MG/1
10 TABLET, FILM COATED ORAL EVERY MORNING
Qty: 90 TABLET | Refills: 3 | Status: SHIPPED | OUTPATIENT
Start: 2024-09-30

## 2024-10-07 ENCOUNTER — OFFICE VISIT (OUTPATIENT)
Dept: CARDIOLOGY CLINIC | Age: 89
End: 2024-10-07
Payer: MEDICARE

## 2024-10-07 VITALS
DIASTOLIC BLOOD PRESSURE: 60 MMHG | HEART RATE: 74 BPM | HEIGHT: 76 IN | WEIGHT: 223 LBS | SYSTOLIC BLOOD PRESSURE: 112 MMHG | BODY MASS INDEX: 27.16 KG/M2 | OXYGEN SATURATION: 96 %

## 2024-10-07 DIAGNOSIS — R06.02 SOB (SHORTNESS OF BREATH): ICD-10-CM

## 2024-10-07 DIAGNOSIS — I10 HYPERTENSION, UNSPECIFIED TYPE: ICD-10-CM

## 2024-10-07 DIAGNOSIS — I50.33 ACUTE ON CHRONIC DIASTOLIC CONGESTIVE HEART FAILURE (HCC): ICD-10-CM

## 2024-10-07 DIAGNOSIS — H54.8 LEGALLY BLIND: ICD-10-CM

## 2024-10-07 DIAGNOSIS — I44.7 LBBB (LEFT BUNDLE BRANCH BLOCK): ICD-10-CM

## 2024-10-07 DIAGNOSIS — I50.22 CHRONIC SYSTOLIC (CONGESTIVE) HEART FAILURE (HCC): ICD-10-CM

## 2024-10-07 DIAGNOSIS — R07.2 PRECORDIAL PAIN: ICD-10-CM

## 2024-10-07 DIAGNOSIS — I25.5 ISCHEMIC CARDIOMYOPATHY: Primary | ICD-10-CM

## 2024-10-07 DIAGNOSIS — I48.0 PAROXYSMAL ATRIAL FIBRILLATION (HCC): ICD-10-CM

## 2024-10-07 PROCEDURE — G8484 FLU IMMUNIZE NO ADMIN: HCPCS | Performed by: INTERNAL MEDICINE

## 2024-10-07 PROCEDURE — G8417 CALC BMI ABV UP PARAM F/U: HCPCS | Performed by: INTERNAL MEDICINE

## 2024-10-07 PROCEDURE — 99214 OFFICE O/P EST MOD 30 MIN: CPT | Performed by: INTERNAL MEDICINE

## 2024-10-07 PROCEDURE — 1123F ACP DISCUSS/DSCN MKR DOCD: CPT | Performed by: INTERNAL MEDICINE

## 2024-10-07 PROCEDURE — G8427 DOCREV CUR MEDS BY ELIG CLIN: HCPCS | Performed by: INTERNAL MEDICINE

## 2024-10-07 PROCEDURE — 1036F TOBACCO NON-USER: CPT | Performed by: INTERNAL MEDICINE

## 2024-10-07 RX ORDER — CARVEDILOL 25 MG/1
25 TABLET ORAL 2 TIMES DAILY WITH MEALS
Qty: 90 TABLET | Refills: 3 | Status: SHIPPED | OUTPATIENT
Start: 2024-10-07

## 2024-10-07 NOTE — PATIENT INSTRUCTIONS
Thank you for allowing us to care for you today!   We want to ensure we can follow your treatment plan and we strive to give you the best outcomes and experience possible.   If you ever have a life threatening emergency and call 911 - for an ambulance (EMS)   Our providers can only care for you at:   [facility] or [facility].   Even if you have someone take you or you drive yourself we can only care for you in a [facility] facility. Our providers are not setup at the other healthcare locations!   **It is YOUR responsibilty to bring medication bottles and/or updated medication list to EACH APPOINTMENT. This will allow us to better serve you and all your healthcare needs**  We are committed to providing you the best care possible.    If you receive a survey after visiting one of our offices, please take time to share your experience concerning your physician office visit.  These surveys are confidential and no health information about you is shared.    We are eager to improve for you and we are counting on your feedback to help make that happen.

## 2024-10-07 NOTE — PROGRESS NOTES
CARDIOLOGY  NOTE    Chief Complaint: SOB/ afib/ LBBB    HPI:   Duarte is a 88 y.o. year old who has Past medical history as noted below.  Very pleasant gentleman comes in with his daughter and son-in-law he is moved to [place] from [place].  He is wheelchair-bound mostly because he is legally blind.  He has  cardiomyopathy with ejection  fraction of 25% he did see nephrology   for creatinine of 2.8  With MUGA scan his EF has improved to 48% by May 2023   But his echo still shows EF of 25 % he was in heart failure last time I saw him but his breathign is better now after  increasing lasix 40 mg bid   Stress test in 2022 showed large severe defect of anterior and apical wall   He got admitted ion April 2024 with renal failure and dehydrated so lasix was stopped  On coreg and entresto after getting cleared from nephrology    HE is on farxiga     Reports that he has been Sleeping in lounge chair, once in awhile gest chets pain, belching helps .  His echo showed severe daily reduced EF of about 25%  in Feb 2023 with wall motion abnormality stress test shows large anterior septal defect with no ischemia   EKG shows left bundle branch block.  Hehas history of A. fib he is on Coumadin but really had echo and stress test in [place] sometime back and used to see cardiology over there . Have history of chronic kidney disease with creatinine 2.2 range.    Current Outpatient Medications   Medication Sig Dispense Refill    carvedilol (COREG) 25 MG tablet Take 1 tablet by mouth 2 times daily (with meals) 90 tablet 3    dapagliflozin (FARXIGA) 10 MG tablet Take 1 tablet by mouth every morning 90 tablet 3    furosemide (LASIX) 40 MG tablet Take 1 tablet by mouth daily 60 tablet 5    sacubitril-valsartan (ENTRESTO) 49-51 MG per tablet Take 1 tablet by mouth 2 times daily 60 tablet 5    pantoprazole (PROTONIX) 40 MG tablet Take 1 tablet by mouth daily      polyethylene glycol (GLYCOLAX) 17

## 2024-10-29 ENCOUNTER — TELEPHONE (OUTPATIENT)
Dept: CARDIOLOGY CLINIC | Age: 89
End: 2024-10-29

## 2024-10-29 NOTE — TELEPHONE ENCOUNTER
Test preformed 10/28, Next OV 12/9/24    notified       Nuclear cardiac blood pool MUGA rest only study     LVEF is 52%    No wall motion  abnormality

## 2024-11-14 ENCOUNTER — HOSPITAL ENCOUNTER (OUTPATIENT)
Age: 89
Discharge: HOME | End: 2024-11-14
Payer: MEDICARE

## 2024-11-14 DIAGNOSIS — I10 HYPERTENSION, UNSPECIFIED TYPE: ICD-10-CM

## 2024-11-14 DIAGNOSIS — H54.8 LEGALLY BLIND: ICD-10-CM

## 2024-11-14 DIAGNOSIS — I50.33 ACUTE ON CHRONIC DIASTOLIC CONGESTIVE HEART FAILURE (HCC): ICD-10-CM

## 2024-11-14 DIAGNOSIS — N40.0 BENIGN PROSTATIC HYPERPLASIA WITHOUT LOWER URINARY TRACT SYMPTOMS: ICD-10-CM

## 2024-11-14 DIAGNOSIS — R53.1 GENERALIZED WEAKNESS: ICD-10-CM

## 2024-11-14 DIAGNOSIS — N18.4 CKD (CHRONIC KIDNEY DISEASE) STAGE 4, GFR 15-29 ML/MIN (HCC): ICD-10-CM

## 2024-11-14 LAB
ALBUMIN SERPL-MCNC: 3.6 G/DL (ref 3.4–5)
ANION GAP SERPL CALCULATED.3IONS-SCNC: 12 MMOL/L (ref 9–17)
BASOPHILS # BLD: 0.05 K/UL
BASOPHILS NFR BLD: 1 % (ref 0–1)
BILIRUB UR QL STRIP: NEGATIVE
BUN SERPL-MCNC: 35 MG/DL (ref 7–20)
CALCIUM SERPL-MCNC: 8.2 MG/DL (ref 8.3–10.6)
CHLORIDE SERPL-SCNC: 101 MMOL/L (ref 99–110)
CLARITY UR: CLEAR
CO2 SERPL-SCNC: 31 MMOL/L (ref 21–32)
COLOR UR: YELLOW
COMMENT: ABNORMAL
CREAT SERPL-MCNC: 2.5 MG/DL (ref 0.8–1.3)
CREAT UR-MCNC: 116 MG/DL (ref 39–259)
EOSINOPHIL # BLD: 0.4 K/UL
EOSINOPHILS RELATIVE PERCENT: 5 % (ref 0–3)
ERYTHROCYTE [DISTWIDTH] IN BLOOD BY AUTOMATED COUNT: 13.8 % (ref 11.7–14.9)
GFR, ESTIMATED: 24 ML/MIN/1.73M2
GLUCOSE SERPL-MCNC: 140 MG/DL (ref 74–99)
GLUCOSE UR STRIP-MCNC: 500 MG/DL
HCT VFR BLD AUTO: 37.9 % (ref 42–52)
HGB BLD-MCNC: 11.5 G/DL (ref 13.5–18)
HGB UR QL STRIP.AUTO: NEGATIVE
IMM GRANULOCYTES # BLD AUTO: 0.02 K/UL
IMM GRANULOCYTES NFR BLD: 0 %
KETONES UR STRIP-MCNC: NEGATIVE MG/DL
LEUKOCYTE ESTERASE UR QL STRIP: NEGATIVE
LYMPHOCYTES NFR BLD: 2.78 K/UL
LYMPHOCYTES RELATIVE PERCENT: 34 % (ref 24–44)
MCH RBC QN AUTO: 28.9 PG (ref 27–31)
MCHC RBC AUTO-ENTMCNC: 30.3 G/DL (ref 32–36)
MCV RBC AUTO: 95.2 FL (ref 78–100)
MONOCYTES NFR BLD: 0.81 K/UL
MONOCYTES NFR BLD: 10 % (ref 0–4)
NEUTROPHILS NFR BLD: 50 % (ref 36–66)
NEUTS SEG NFR BLD: 4.1 K/UL
NITRITE UR QL STRIP: NEGATIVE
PH UR STRIP: 6 [PH] (ref 5–8)
PHOSPHATE SERPL-MCNC: 3.4 MG/DL (ref 2.5–4.9)
PLATELET # BLD AUTO: 152 K/UL (ref 140–440)
PMV BLD AUTO: 10.2 FL (ref 7.5–11.1)
POTASSIUM SERPL-SCNC: 3.7 MMOL/L (ref 3.5–5.1)
PROT UR STRIP-MCNC: NEGATIVE MG/DL
RBC # BLD AUTO: 3.98 M/UL (ref 4.6–6.2)
SODIUM SERPL-SCNC: 144 MMOL/L (ref 136–145)
SP GR UR STRIP: 1.01 (ref 1–1.03)
TOTAL PROTEIN, URINE: 10 MG/DL
URINE TOTAL PROTEIN CREATININE RATIO: 0.09 (ref 0–0.2)
UROBILINOGEN UR STRIP-ACNC: 0.2 EU/DL (ref 0–1)
WBC OTHER # BLD: 8.2 K/UL (ref 4–10.5)

## 2024-11-14 PROCEDURE — 81003 URINALYSIS AUTO W/O SCOPE: CPT

## 2024-11-14 PROCEDURE — 85025 COMPLETE CBC W/AUTO DIFF WBC: CPT

## 2024-11-14 PROCEDURE — 84100 ASSAY OF PHOSPHORUS: CPT

## 2024-11-14 PROCEDURE — 82570 ASSAY OF URINE CREATININE: CPT

## 2024-11-14 PROCEDURE — 84156 ASSAY OF PROTEIN URINE: CPT

## 2024-11-14 PROCEDURE — 36415 COLL VENOUS BLD VENIPUNCTURE: CPT

## 2024-11-14 PROCEDURE — 82040 ASSAY OF SERUM ALBUMIN: CPT

## 2024-11-14 PROCEDURE — 80048 BASIC METABOLIC PNL TOTAL CA: CPT

## 2024-11-26 RX ORDER — SACUBITRIL AND VALSARTAN 49; 51 MG/1; MG/1
1 TABLET, FILM COATED ORAL 2 TIMES DAILY
Qty: 60 TABLET | Refills: 5 | OUTPATIENT
Start: 2024-11-26

## 2025-01-02 ENCOUNTER — HOSPITAL ENCOUNTER (OUTPATIENT)
Age: 89
Setting detail: SPECIMEN
Discharge: HOME | End: 2025-01-02
Payer: MEDICARE

## 2025-01-02 LAB
ALBUMIN SERPL-MCNC: 3.6 G/DL (ref 3.4–5)
ALBUMIN/GLOB SERPL: 1.3 {RATIO} (ref 1.1–2.2)
ALP SERPL-CCNC: 71 U/L (ref 40–129)
ALT SERPL-CCNC: 8 U/L (ref 10–40)
ANION GAP SERPL CALCULATED.3IONS-SCNC: 12 MMOL/L (ref 9–17)
AST SERPL-CCNC: 18 U/L (ref 15–37)
BASOPHILS # BLD: 0.06 K/UL
BASOPHILS NFR BLD: 1 % (ref 0–1)
BILIRUB SERPL-MCNC: 0.3 MG/DL (ref 0–1)
BILIRUB UR QL STRIP: NEGATIVE
BUN SERPL-MCNC: 34 MG/DL (ref 7–20)
CALCIUM SERPL-MCNC: 8.1 MG/DL (ref 8.3–10.6)
CHLORIDE SERPL-SCNC: 98 MMOL/L (ref 99–110)
CHOLEST SERPL-MCNC: 156 MG/DL (ref 125–199)
CLARITY UR: CLEAR
CO2 SERPL-SCNC: 31 MMOL/L (ref 21–32)
COLOR UR: YELLOW
COMMENT: ABNORMAL
CREAT SERPL-MCNC: 2.4 MG/DL (ref 0.8–1.3)
EOSINOPHIL # BLD: 0.38 K/UL
EOSINOPHILS RELATIVE PERCENT: 5 % (ref 0–3)
ERYTHROCYTE [DISTWIDTH] IN BLOOD BY AUTOMATED COUNT: 13.8 % (ref 11.7–14.9)
GFR, ESTIMATED: 25 ML/MIN/1.73M2
GLUCOSE SERPL-MCNC: 138 MG/DL (ref 74–99)
GLUCOSE UR STRIP-MCNC: 250 MG/DL
HCT VFR BLD AUTO: 38.8 % (ref 42–52)
HDLC SERPL-MCNC: 31 MG/DL
HGB BLD-MCNC: 11.7 G/DL (ref 13.5–18)
HGB UR QL STRIP.AUTO: NEGATIVE
IMM GRANULOCYTES # BLD AUTO: 0.02 K/UL
IMM GRANULOCYTES NFR BLD: 0 %
KETONES UR STRIP-MCNC: NEGATIVE MG/DL
LDLC SERPL CALC-MCNC: 93 MG/DL
LEUKOCYTE ESTERASE UR QL STRIP: NEGATIVE
LYMPHOCYTES NFR BLD: 3.12 K/UL
LYMPHOCYTES RELATIVE PERCENT: 39 % (ref 24–44)
MCH RBC QN AUTO: 28.7 PG (ref 27–31)
MCHC RBC AUTO-ENTMCNC: 30.2 G/DL (ref 32–36)
MCV RBC AUTO: 95.3 FL (ref 78–100)
MONOCYTES NFR BLD: 0.71 K/UL
MONOCYTES NFR BLD: 9 % (ref 0–4)
NEUTROPHILS NFR BLD: 47 % (ref 36–66)
NEUTS SEG NFR BLD: 3.8 K/UL
NITRITE UR QL STRIP: NEGATIVE
PH UR STRIP: 7 [PH] (ref 5–8)
PLATELET # BLD AUTO: 173 K/UL (ref 140–440)
PMV BLD AUTO: 10.2 FL (ref 7.5–11.1)
POTASSIUM SERPL-SCNC: 4.3 MMOL/L (ref 3.5–5.1)
PROT SERPL-MCNC: 6.4 G/DL (ref 6.4–8.2)
PROT UR STRIP-MCNC: NEGATIVE MG/DL
PSA SERPL-MCNC: 4.41 NG/ML (ref 0–4)
RBC # BLD AUTO: 4.07 M/UL (ref 4.6–6.2)
SODIUM SERPL-SCNC: 140 MMOL/L (ref 136–145)
SP GR UR STRIP: 1.01 (ref 1–1.03)
TRIGL SERPL-MCNC: 157 MG/DL
TSH SERPL DL<=0.05 MIU/L-ACNC: 2.19 UIU/ML (ref 0.27–4.2)
UROBILINOGEN UR STRIP-ACNC: 0.2 EU/DL (ref 0–1)
WBC OTHER # BLD: 8.1 K/UL (ref 4–10.5)

## 2025-01-02 PROCEDURE — 81003 URINALYSIS AUTO W/O SCOPE: CPT

## 2025-01-02 PROCEDURE — 80053 COMPREHEN METABOLIC PANEL: CPT

## 2025-01-02 PROCEDURE — 84443 ASSAY THYROID STIM HORMONE: CPT

## 2025-01-02 PROCEDURE — 80061 LIPID PANEL: CPT

## 2025-01-02 PROCEDURE — G0103 PSA SCREENING: HCPCS

## 2025-01-02 PROCEDURE — 85025 COMPLETE CBC W/AUTO DIFF WBC: CPT

## 2025-02-11 ENCOUNTER — HOSPITAL ENCOUNTER (OUTPATIENT)
Age: 89
Discharge: HOME | End: 2025-02-11
Payer: MEDICARE

## 2025-02-11 DIAGNOSIS — N18.4 CKD (CHRONIC KIDNEY DISEASE) STAGE 4, GFR 15-29 ML/MIN (HCC): ICD-10-CM

## 2025-02-11 DIAGNOSIS — R53.1 GENERALIZED WEAKNESS: ICD-10-CM

## 2025-02-11 DIAGNOSIS — N40.0 BENIGN PROSTATIC HYPERPLASIA WITHOUT LOWER URINARY TRACT SYMPTOMS: ICD-10-CM

## 2025-02-11 DIAGNOSIS — I50.22 CHRONIC SYSTOLIC (CONGESTIVE) HEART FAILURE (HCC): ICD-10-CM

## 2025-02-11 DIAGNOSIS — I10 HYPERTENSION, UNSPECIFIED TYPE: ICD-10-CM

## 2025-02-11 LAB
ALBUMIN SERPL-MCNC: 3.3 G/DL (ref 3.4–5)
ANION GAP SERPL CALCULATED.3IONS-SCNC: 12 MMOL/L (ref 9–17)
BILIRUB UR QL STRIP: NEGATIVE
BUN SERPL-MCNC: 32 MG/DL (ref 7–20)
CALCIUM SERPL-MCNC: 8.4 MG/DL (ref 8.3–10.6)
CHLORIDE SERPL-SCNC: 100 MMOL/L (ref 99–110)
CLARITY UR: CLEAR
CO2 SERPL-SCNC: 34 MMOL/L (ref 21–32)
COLOR UR: YELLOW
COMMENT: ABNORMAL
CREAT SERPL-MCNC: 2.6 MG/DL (ref 0.8–1.3)
CREAT UR-MCNC: 135 MG/DL (ref 39–259)
GFR, ESTIMATED: 23 ML/MIN/1.73M2
GLUCOSE SERPL-MCNC: 153 MG/DL (ref 74–99)
GLUCOSE UR STRIP-MCNC: 250 MG/DL
HGB UR QL STRIP.AUTO: NEGATIVE
KETONES UR STRIP-MCNC: NEGATIVE MG/DL
LEUKOCYTE ESTERASE UR QL STRIP: NEGATIVE
MAGNESIUM SERPL-MCNC: 2.2 MG/DL (ref 1.8–2.4)
NITRITE UR QL STRIP: NEGATIVE
PH UR STRIP: 6 [PH] (ref 5–8)
PHOSPHATE SERPL-MCNC: 3 MG/DL (ref 2.5–4.9)
POTASSIUM SERPL-SCNC: 3.9 MMOL/L (ref 3.5–5.1)
PROT UR STRIP-MCNC: NEGATIVE MG/DL
SODIUM SERPL-SCNC: 145 MMOL/L (ref 136–145)
SP GR UR STRIP: 1.01 (ref 1–1.03)
TOTAL PROTEIN, URINE: 12 MG/DL
URINE TOTAL PROTEIN CREATININE RATIO: 0.09 (ref 0–0.2)
UROBILINOGEN UR STRIP-ACNC: 1 EU/DL (ref 0–1)

## 2025-02-11 PROCEDURE — 83735 ASSAY OF MAGNESIUM: CPT

## 2025-02-11 PROCEDURE — 81003 URINALYSIS AUTO W/O SCOPE: CPT

## 2025-02-11 PROCEDURE — 84156 ASSAY OF PROTEIN URINE: CPT

## 2025-02-11 PROCEDURE — 80048 BASIC METABOLIC PNL TOTAL CA: CPT

## 2025-02-11 PROCEDURE — 84100 ASSAY OF PHOSPHORUS: CPT

## 2025-02-11 PROCEDURE — 82570 ASSAY OF URINE CREATININE: CPT

## 2025-02-11 PROCEDURE — 82040 ASSAY OF SERUM ALBUMIN: CPT

## 2025-02-17 RX ORDER — CARVEDILOL 25 MG/1
25 TABLET ORAL 2 TIMES DAILY WITH MEALS
Qty: 180 TABLET | Refills: 1 | Status: ON HOLD | OUTPATIENT
Start: 2025-02-17

## 2025-02-20 ENCOUNTER — APPOINTMENT (OUTPATIENT)
Dept: CT IMAGING | Age: 89
DRG: 871 | End: 2025-02-20
Payer: MEDICARE

## 2025-02-20 ENCOUNTER — APPOINTMENT (OUTPATIENT)
Dept: GENERAL RADIOLOGY | Age: 89
DRG: 871 | End: 2025-02-20
Payer: MEDICARE

## 2025-02-20 ENCOUNTER — HOSPITAL ENCOUNTER (INPATIENT)
Age: 89
LOS: 5 days | Discharge: NURSING, REHAB OR LONG-TERM CARE | DRG: 871 | End: 2025-02-25
Admitting: STUDENT IN AN ORGANIZED HEALTH CARE EDUCATION/TRAINING PROGRAM
Payer: MEDICARE

## 2025-02-20 DIAGNOSIS — R53.1 GENERALIZED WEAKNESS: ICD-10-CM

## 2025-02-20 DIAGNOSIS — J18.9 PNEUMONIA OF RIGHT UPPER LOBE DUE TO INFECTIOUS ORGANISM: ICD-10-CM

## 2025-02-20 DIAGNOSIS — A41.9 SEPSIS WITHOUT ACUTE ORGAN DYSFUNCTION, DUE TO UNSPECIFIED ORGANISM (HCC): Primary | ICD-10-CM

## 2025-02-20 DIAGNOSIS — Z87.448 HX OF CHRONIC KIDNEY DISEASE: ICD-10-CM

## 2025-02-20 PROBLEM — J16.0 CAP (COMMUNITY ACQUIRED PNEUMONIA) DUE TO CHLAMYDIA SPECIES: Status: ACTIVE | Noted: 2025-02-20

## 2025-02-20 LAB
ALBUMIN SERPL-MCNC: 3.5 G/DL (ref 3.4–5)
ALBUMIN/GLOB SERPL: 1.2 {RATIO} (ref 1.1–2.2)
ALP SERPL-CCNC: 63 U/L (ref 40–129)
ALT SERPL-CCNC: 7 U/L (ref 10–40)
AMMONIA PLAS-SCNC: 25 UMOL/L (ref 16–60)
ANION GAP SERPL CALCULATED.3IONS-SCNC: 15 MMOL/L (ref 9–17)
ARTERIAL PATENCY WRIST A: ABNORMAL
AST SERPL-CCNC: 22 U/L (ref 15–37)
BASOPHILS # BLD: 0.02 K/UL
BASOPHILS NFR BLD: 0 % (ref 0–1)
BILIRUB SERPL-MCNC: 0.8 MG/DL (ref 0–1)
BILIRUB UR QL STRIP: NEGATIVE
BODY TEMPERATURE: 37
BUN SERPL-MCNC: 34 MG/DL (ref 7–20)
CALCIUM SERPL-MCNC: 8.9 MG/DL (ref 8.3–10.6)
CHLORIDE SERPL-SCNC: 95 MMOL/L (ref 99–110)
CLARITY UR: CLEAR
CO2 SERPL-SCNC: 29 MMOL/L (ref 21–32)
COHGB MFR BLD: 0.8 % (ref 0.5–1.5)
COLOR UR: YELLOW
CREAT SERPL-MCNC: 2.8 MG/DL (ref 0.8–1.3)
CRP SERPL HS-MCNC: 165 MG/L (ref 0–5)
EOSINOPHIL # BLD: 0.02 K/UL
EOSINOPHILS RELATIVE PERCENT: 0 % (ref 0–3)
ERYTHROCYTE [DISTWIDTH] IN BLOOD BY AUTOMATED COUNT: 14.1 % (ref 11.7–14.9)
GFR, ESTIMATED: 22 ML/MIN/1.73M2
GLUCOSE SERPL-MCNC: 115 MG/DL (ref 74–99)
GLUCOSE UR STRIP-MCNC: 250 MG/DL
HCO3 VENOUS: 31.7 MMOL/L (ref 22–29)
HCT VFR BLD AUTO: 35.6 % (ref 42–52)
HGB BLD-MCNC: 10.9 G/DL (ref 13.5–18)
HGB UR QL STRIP.AUTO: ABNORMAL
IMM GRANULOCYTES # BLD AUTO: 0.08 K/UL
IMM GRANULOCYTES NFR BLD: 1 %
INFLUENZA A BY PCR: ABNORMAL
INFLUENZA A BY PCR: NOT DETECTED
INFLUENZA B BY PCR: ABNORMAL
INFLUENZA B BY PCR: NOT DETECTED
INR PPP: 6.8
KETONES UR STRIP-MCNC: NEGATIVE MG/DL
LACTATE BLDV-SCNC: 0.9 MMOL/L (ref 0.4–2)
LEUKOCYTE ESTERASE UR QL STRIP: NEGATIVE
LYMPHOCYTES NFR BLD: 1.43 K/UL
LYMPHOCYTES RELATIVE PERCENT: 11 % (ref 24–44)
MCH RBC QN AUTO: 29.1 PG (ref 27–31)
MCHC RBC AUTO-ENTMCNC: 30.6 G/DL (ref 32–36)
MCV RBC AUTO: 95.2 FL (ref 78–100)
METHEMOGLOBIN: 0.4 % (ref 0.5–1.5)
MONOCYTES NFR BLD: 1.18 K/UL
MONOCYTES NFR BLD: 9 % (ref 0–4)
NEUTROPHILS NFR BLD: 79 % (ref 36–66)
NEUTS SEG NFR BLD: 10.02 K/UL
NITRITE UR QL STRIP: NEGATIVE
OXYHGB MFR BLD: 51.9 %
PCO2 VENOUS: 51.7 MM HG (ref 38–54)
PH UR STRIP: 6 [PH] (ref 5–8)
PH VENOUS: 7.41 (ref 7.32–7.43)
PLATELET # BLD AUTO: 141 K/UL (ref 140–440)
PMV BLD AUTO: 9.8 FL (ref 7.5–11.1)
PO2 VENOUS: 26.2 MM HG (ref 23–48)
POSITIVE BASE EXCESS, VEN: 6 MMOL/L (ref 0–3)
POTASSIUM SERPL-SCNC: 4.3 MMOL/L (ref 3.5–5.1)
PROCALCITONIN SERPL-MCNC: 0.2 NG/ML
PROT SERPL-MCNC: 6.6 G/DL (ref 6.4–8.2)
PROT UR STRIP-MCNC: 30 MG/DL
PROTHROMBIN TIME: 60.5 SEC (ref 11.7–14.5)
RBC # BLD AUTO: 3.74 M/UL (ref 4.6–6.2)
RBC #/AREA URNS HPF: 1 /HPF (ref 0–2)
SODIUM SERPL-SCNC: 140 MMOL/L (ref 136–145)
SP GR UR STRIP: 1.01 (ref 1–1.03)
TROPONIN I SERPL HS-MCNC: 36 NG/L (ref 0–22)
TROPONIN I SERPL HS-MCNC: 38 NG/L (ref 0–22)
UROBILINOGEN UR STRIP-ACNC: 0.2 EU/DL (ref 0–1)
WBC #/AREA URNS HPF: <1 /HPF (ref 0–5)
WBC OTHER # BLD: 12.8 K/UL (ref 4–10.5)

## 2025-02-20 PROCEDURE — 36415 COLL VENOUS BLD VENIPUNCTURE: CPT

## 2025-02-20 PROCEDURE — 6370000000 HC RX 637 (ALT 250 FOR IP): Performed by: STUDENT IN AN ORGANIZED HEALTH CARE EDUCATION/TRAINING PROGRAM

## 2025-02-20 PROCEDURE — 2500000003 HC RX 250 WO HCPCS: Performed by: STUDENT IN AN ORGANIZED HEALTH CARE EDUCATION/TRAINING PROGRAM

## 2025-02-20 PROCEDURE — 74176 CT ABD & PELVIS W/O CONTRAST: CPT

## 2025-02-20 PROCEDURE — 85025 COMPLETE CBC W/AUTO DIFF WBC: CPT

## 2025-02-20 PROCEDURE — 2580000003 HC RX 258: Performed by: STUDENT IN AN ORGANIZED HEALTH CARE EDUCATION/TRAINING PROGRAM

## 2025-02-20 PROCEDURE — 83605 ASSAY OF LACTIC ACID: CPT

## 2025-02-20 PROCEDURE — 87502 INFLUENZA DNA AMP PROBE: CPT

## 2025-02-20 PROCEDURE — 6360000002 HC RX W HCPCS: Performed by: STUDENT IN AN ORGANIZED HEALTH CARE EDUCATION/TRAINING PROGRAM

## 2025-02-20 PROCEDURE — 87086 URINE CULTURE/COLONY COUNT: CPT

## 2025-02-20 PROCEDURE — 6360000002 HC RX W HCPCS

## 2025-02-20 PROCEDURE — 82140 ASSAY OF AMMONIA: CPT

## 2025-02-20 PROCEDURE — 93005 ELECTROCARDIOGRAM TRACING: CPT

## 2025-02-20 PROCEDURE — 87040 BLOOD CULTURE FOR BACTERIA: CPT

## 2025-02-20 PROCEDURE — 85610 PROTHROMBIN TIME: CPT

## 2025-02-20 PROCEDURE — 71045 X-RAY EXAM CHEST 1 VIEW: CPT

## 2025-02-20 PROCEDURE — 86140 C-REACTIVE PROTEIN: CPT

## 2025-02-20 PROCEDURE — 94761 N-INVAS EAR/PLS OXIMETRY MLT: CPT

## 2025-02-20 PROCEDURE — 80053 COMPREHEN METABOLIC PANEL: CPT

## 2025-02-20 PROCEDURE — 82805 BLOOD GASES W/O2 SATURATION: CPT

## 2025-02-20 PROCEDURE — 81001 URINALYSIS AUTO W/SCOPE: CPT

## 2025-02-20 PROCEDURE — 84145 PROCALCITONIN (PCT): CPT

## 2025-02-20 PROCEDURE — 84484 ASSAY OF TROPONIN QUANT: CPT

## 2025-02-20 PROCEDURE — 2700000000 HC OXYGEN THERAPY PER DAY

## 2025-02-20 PROCEDURE — 2580000003 HC RX 258

## 2025-02-20 PROCEDURE — 1200000000 HC SEMI PRIVATE

## 2025-02-20 PROCEDURE — 99285 EMERGENCY DEPT VISIT HI MDM: CPT

## 2025-02-20 RX ORDER — ONDANSETRON 2 MG/ML
4 INJECTION INTRAMUSCULAR; INTRAVENOUS EVERY 6 HOURS PRN
Status: DISCONTINUED | OUTPATIENT
Start: 2025-02-20 | End: 2025-02-25 | Stop reason: HOSPADM

## 2025-02-20 RX ORDER — ALBUTEROL SULFATE 0.83 MG/ML
2.5 SOLUTION RESPIRATORY (INHALATION)
Status: DISCONTINUED | OUTPATIENT
Start: 2025-02-20 | End: 2025-02-25 | Stop reason: HOSPADM

## 2025-02-20 RX ORDER — PANTOPRAZOLE SODIUM 40 MG/1
40 TABLET, DELAYED RELEASE ORAL DAILY
Status: DISCONTINUED | OUTPATIENT
Start: 2025-02-21 | End: 2025-02-25 | Stop reason: HOSPADM

## 2025-02-20 RX ORDER — SODIUM CHLORIDE 0.9 % (FLUSH) 0.9 %
5-40 SYRINGE (ML) INJECTION PRN
Status: DISCONTINUED | OUTPATIENT
Start: 2025-02-20 | End: 2025-02-25 | Stop reason: HOSPADM

## 2025-02-20 RX ORDER — DEXTROSE MONOHYDRATE AND SODIUM CHLORIDE 5; .9 G/100ML; G/100ML
INJECTION, SOLUTION INTRAVENOUS CONTINUOUS
Status: DISPENSED | OUTPATIENT
Start: 2025-02-20 | End: 2025-02-21

## 2025-02-20 RX ORDER — ONDANSETRON 4 MG/1
4 TABLET, ORALLY DISINTEGRATING ORAL EVERY 8 HOURS PRN
Status: DISCONTINUED | OUTPATIENT
Start: 2025-02-20 | End: 2025-02-25 | Stop reason: HOSPADM

## 2025-02-20 RX ORDER — ACETAMINOPHEN 650 MG/1
650 SUPPOSITORY RECTAL EVERY 6 HOURS PRN
Status: DISCONTINUED | OUTPATIENT
Start: 2025-02-20 | End: 2025-02-25 | Stop reason: HOSPADM

## 2025-02-20 RX ORDER — DOXAZOSIN 4 MG/1
4 TABLET ORAL DAILY
Status: DISCONTINUED | OUTPATIENT
Start: 2025-02-20 | End: 2025-02-25 | Stop reason: HOSPADM

## 2025-02-20 RX ORDER — DOCUSATE SODIUM 100 MG/1
100 CAPSULE, LIQUID FILLED ORAL DAILY
Status: DISCONTINUED | OUTPATIENT
Start: 2025-02-21 | End: 2025-02-25 | Stop reason: HOSPADM

## 2025-02-20 RX ORDER — SACUBITRIL AND VALSARTAN 49; 51 MG/1; MG/1
1 TABLET, FILM COATED ORAL 2 TIMES DAILY
Status: DISCONTINUED | OUTPATIENT
Start: 2025-02-20 | End: 2025-02-25 | Stop reason: HOSPADM

## 2025-02-20 RX ORDER — FINASTERIDE 5 MG/1
5 TABLET, FILM COATED ORAL DAILY
Status: DISCONTINUED | OUTPATIENT
Start: 2025-02-21 | End: 2025-02-25 | Stop reason: HOSPADM

## 2025-02-20 RX ORDER — LATANOPROST 50 UG/ML
1 SOLUTION/ DROPS OPHTHALMIC NIGHTLY
Status: DISCONTINUED | OUTPATIENT
Start: 2025-02-20 | End: 2025-02-25 | Stop reason: HOSPADM

## 2025-02-20 RX ORDER — FUROSEMIDE 40 MG/1
40 TABLET ORAL DAILY
Status: DISCONTINUED | OUTPATIENT
Start: 2025-02-21 | End: 2025-02-25 | Stop reason: HOSPADM

## 2025-02-20 RX ORDER — SODIUM CHLORIDE 0.9 % (FLUSH) 0.9 %
5-40 SYRINGE (ML) INJECTION EVERY 12 HOURS SCHEDULED
Status: DISCONTINUED | OUTPATIENT
Start: 2025-02-20 | End: 2025-02-25 | Stop reason: HOSPADM

## 2025-02-20 RX ORDER — AZITHROMYCIN 250 MG/1
500 TABLET, FILM COATED ORAL EVERY 24 HOURS
Status: COMPLETED | OUTPATIENT
Start: 2025-02-20 | End: 2025-02-22

## 2025-02-20 RX ORDER — CARVEDILOL 25 MG/1
25 TABLET ORAL 2 TIMES DAILY WITH MEALS
Status: DISCONTINUED | OUTPATIENT
Start: 2025-02-20 | End: 2025-02-25 | Stop reason: HOSPADM

## 2025-02-20 RX ORDER — POLYETHYLENE GLYCOL 3350 17 G/17G
17 POWDER, FOR SOLUTION ORAL DAILY PRN
Status: DISCONTINUED | OUTPATIENT
Start: 2025-02-20 | End: 2025-02-25 | Stop reason: HOSPADM

## 2025-02-20 RX ORDER — ACETAMINOPHEN 325 MG/1
650 TABLET ORAL EVERY 6 HOURS PRN
Status: DISCONTINUED | OUTPATIENT
Start: 2025-02-20 | End: 2025-02-25 | Stop reason: HOSPADM

## 2025-02-20 RX ORDER — SODIUM CHLORIDE 9 MG/ML
INJECTION, SOLUTION INTRAVENOUS PRN
Status: DISCONTINUED | OUTPATIENT
Start: 2025-02-20 | End: 2025-02-25 | Stop reason: HOSPADM

## 2025-02-20 RX ORDER — PHYTONADIONE 10 MG/ML
10 INJECTION, EMULSION INTRAMUSCULAR; INTRAVENOUS; SUBCUTANEOUS ONCE
Status: COMPLETED | OUTPATIENT
Start: 2025-02-20 | End: 2025-02-20

## 2025-02-20 RX ADMIN — DOXAZOSIN 4 MG: 4 TABLET ORAL at 21:40

## 2025-02-20 RX ADMIN — CARVEDILOL 25 MG: 25 TABLET, FILM COATED ORAL at 21:40

## 2025-02-20 RX ADMIN — CEFEPIME 2000 MG: 2 INJECTION, POWDER, FOR SOLUTION INTRAVENOUS at 14:19

## 2025-02-20 RX ADMIN — LATANOPROST 1 DROP: 50 SOLUTION OPHTHALMIC at 21:42

## 2025-02-20 RX ADMIN — AZITHROMYCIN DIHYDRATE 500 MG: 250 TABLET ORAL at 21:40

## 2025-02-20 RX ADMIN — WATER 1000 MG: 1 INJECTION INTRAMUSCULAR; INTRAVENOUS; SUBCUTANEOUS at 21:31

## 2025-02-20 RX ADMIN — PHYTONADIONE 10 MG: 10 INJECTION, EMULSION INTRAMUSCULAR; INTRAVENOUS; SUBCUTANEOUS at 21:37

## 2025-02-20 RX ADMIN — Medication 10 MG: at 21:40

## 2025-02-20 RX ADMIN — DEXTROSE AND SODIUM CHLORIDE: 5; .9 INJECTION, SOLUTION INTRAVENOUS at 15:10

## 2025-02-20 ASSESSMENT — PAIN SCALES - GENERAL
PAINLEVEL_OUTOF10: 0
PAINLEVEL_OUTOF10: 0

## 2025-02-20 NOTE — H&P
V2.0  History and Physical      Name:  Duarte Reyes /Age/Sex: 1935  (89 y.o. male)   MRN & CSN:  [id number] & [id number] Encounter Date/Time: 2025 2:05 PM EST   Location:  ED21/ED-21 PCP: Jose Napoles MD       Hospital Day: 1    Assessment and Plan:   Duarte Reyes is a 89 y.o. male who presents with CAP (community acquired pneumonia) due to Chlamydia species    Hospital Problems             Last Modified POA    * (Principal) CAP (community acquired pneumonia) due to Chlamydia species 2025 Yes       Plan:    CAP  Generalized weakness    Patient reports cough but otherwise denies any chest pain or SOB   [place]   UA negative    Mild leucocytosis   Chest xray consistent with pneumonia   Obtain respiratory panel, sputum culture, Pro-joseph and CRP   Will start on rocephin and azithro   Saturating well in RA   Family unable to take care of the patient as he has gotten more weak. Patient interested in rehab followed by possible DC to assisted living. This was dicussed with ED CM    Mild JESSICA CKD 3B   Cr slightly elevated from his baseline last Cr was 2.6    Will start on gentle hydration as his oral intake is poor  Nephrology consulted      HTN   Hold  Entresto due to JESSICA    Ischemic cardiomyopathy - stable    HELD  Entresto    PAF   On coreg and warfarin   Will obtain INR   Pharmacy consulted    Obtain EKG      BPH   On Proscar             Disposition:   Current Living situation: home  Expected Disposition: ARU   Estimated D/C: 2 days    Diet No diet orders on file   DVT Prophylaxis [] Lovenox, []  Heparin, [] SCDs, [] Ambulation,  [] Eliquis, [] Xarelto, [x] Coumadin   Code Status Prior   Surrogate Decision Maker/ POA      Personally reviewed Lab Studies and Imaging     Discussed management of the case with ER provider who recommended admission       Imaging that was interpreted personally includes chest xray and results Right upper lobe PNA    Drugs that require monitoring for toxicity include

## 2025-02-20 NOTE — CONSULTS
ADVANCED NEPHROLOGY CONSULT NOTE  Dr. Chance Cristina and Dr Faustino Willard                Assessment    1.  Right upper lobe infiltrate possible pneumonia  #2 abnormal influenza test read check was negative  #3 acute renal failure on CKD 4  #4 history of congestive heart failure with risk of fluid overload ischemic cardiomyopathy  #5 legally blind  #6 hypertension with low blood pressure        Plan   1.  Possible aspiration patient agrees having hard time with swallowing treat for possible pneumonia report indicates possible chlamydia species unsure where that was from follow-up cultures at this time  #2 recheck influenza was negative would still be careful to monitor in the setting of respiratory and isolation  #3 renal failure monitor slightly worsening baseline CKD not uremic medical management gentle hydration for now  #4 does not appear fluid overloaded supportive care more likely of pneumonia  #5 legally blind patient stays at home with family and at this time it appears they prefer to look at options including rehab  #6 blood pressure staying somewhat low we will hold the Entresto and blood pressure meds for now    Date:2/20/2025        Patient Name:Duarte Reyes     YOB: 1935     Age:89 y.o.        Chief Complaint     Reason for Consult: Acute renal failure CKD    History Obtained From   patient, family member -son in law and daughter electronic medical record    History of Present Illness   The patient is a 89 y.o. male who presents with weakness fatigue shortness of breath presents from home with not feeling well lives with his daughter and son-in-law legally blind.  Patient known to me with underlying chronic kidney disease apparently he was having increased cough productive sputum x-ray indicates upper lobe infiltrate possible aspiration decreased urine output some urinary frequency asked about possible UTI as well in the setting of getting more weak they brought him to the hospital

## 2025-02-20 NOTE — ED NOTES
Medication History  [facility]    Patient Name: Duarte Reyes 1935     Medication history has been completed by: Lamar Cutler [facility]    Source(s) of information: patient's family via phone and insurance claims     Primary Care Physician: Jose Napoles MD     Pharm[facility]    Allergies as of 02/20/2025    (No Known Allergies)        Prior to Admission medications    Medication Sig Start Date End Date Taking? Authorizing Provider   vitamin D 25 MCG (1000 UT) CAPS Take 1 capsule by mouth daily   Yes David Bone MD   carvedilol (COREG) 25 MG tablet Take 1 tablet by mouth 2 times daily (with meals) 2/17/25  Yes Ying Peña APRN - CNP   sacubitril-valsartan (ENTRESTO) 49-51 MG per tablet Take 1 tablet by mouth 2 times daily 11/26/24  Yes Ying Peña APRN - CNP   dapagliflozin (FARXIGA) 10 MG tablet Take 1 tablet by mouth every morning 9/30/24  Yes Ying Peña APRN - CNP   furosemide (LASIX) 40 MG tablet Take 1 tablet by mouth daily 7/29/24  Yes Ying Peña APRN - CNP   pantoprazole (PROTONIX) 40 MG tablet Take 1 tablet by mouth daily 1/20/24  Yes David Bone MD   Melatonin 10 MG TABS Take 10 mg by mouth daily   Yes David Bone MD   AZO CRANBERRY GUMMIES PO Take by mouth   Yes David Bone MD   finasteride (PROSCAR) 5 MG tablet Take 1 tablet by mouth daily 2/22/23  Yes David Boen MD   Multiple Vitamins-Minerals (PRESERVISION AREDS 2 PO) Take 1 capsule by mouth daily   Yes David Bone MD   docusate sodium (COLACE) 100 MG capsule Take 1 capsule by mouth daily 10/1/22  Yes David Bone MD   latanoprost (XALATAN) 0.005 % ophthalmic solution Place 1 drop into the right eye nightly 9/14/22  Yes David Bone MD   terazosin (HYTRIN) 5 MG capsule Take 1 capsule by mouth nightly 6/21/22  Yes Jim Rosas MD   warfarin (COUMADIN) 5 MG tablet Take 1 tablet by mouth daily  Patient taking differently:

## 2025-02-20 NOTE — ED PROVIDER NOTES
use: Never    Sexual activity: Not on file   Other Topics Concern    Not on file   Social History Narrative    Not on file     Social Determinants of Health     Financial Resource Strain: Not on file   Food Insecurity: No Food Insecurity (4/25/2024)    Hunger Vital Sign     Worried About Running Out of Food in the Last Year: Never true     Ran Out of Food in the Last Year: Never true   Transportation Needs: No Transportation Needs (4/25/2024)    PRAPARE - Transportation     Lack of Transportation (Medical): No     Lack of Transportation (Non-Medical): No   Physical Activity: Not on file   Stress: Not on file   Social Connections: Not on file   Intimate Partner Violence: Not on file   Housing Stability: Low Risk  (4/25/2024)    Housing Stability Vital Sign     Unable to Pay for Housing in the Last Year: No     Number of Places Lived in the Last Year: 1     Unstable Housing in the Last Year: No     Current Facility-Administered Medications   Medication Dose Route Frequency Provider Last Rate Last Admin    dextrose 5 % and 0.9 % sodium chloride infusion   IntraVENous Continuous Estevan Ulloa MD 75 mL/hr at 02/20/25 1510 New Bag at 02/20/25 1510     Current Outpatient Medications   Medication Sig Dispense Refill    vitamin D 25 MCG (1000 UT) CAPS Take 1 capsule by mouth daily      carvedilol (COREG) 25 MG tablet Take 1 tablet by mouth 2 times daily (with meals) 180 tablet 1    sacubitril-valsartan (ENTRESTO) 49-51 MG per tablet Take 1 tablet by mouth 2 times daily 180 tablet 1    dapagliflozin (FARXIGA) 10 MG tablet Take 1 tablet by mouth every morning 90 tablet 3    furosemide (LASIX) 40 MG tablet Take 1 tablet by mouth daily 60 tablet 5    pantoprazole (PROTONIX) 40 MG tablet Take 1 tablet by mouth daily      polyethylene glycol (GLYCOLAX) 17 GM/SCOOP powder as needed      Melatonin 10 MG TABS Take 10 mg by mouth daily      AZO CRANBERRY GUMMIES PO Take by mouth      finasteride (PROSCAR) 5 MG tablet Take 1

## 2025-02-21 LAB
ALBUMIN SERPL-MCNC: 2.9 G/DL (ref 3.4–5)
ALBUMIN/GLOB SERPL: 0.9 {RATIO} (ref 1.1–2.2)
ALP SERPL-CCNC: 61 U/L (ref 40–129)
ALT SERPL-CCNC: <5 U/L (ref 10–40)
ANION GAP SERPL CALCULATED.3IONS-SCNC: 8 MMOL/L (ref 9–17)
AST SERPL-CCNC: 17 U/L (ref 15–37)
B PARAP IS1001 DNA NPH QL NAA+NON-PROBE: NOT DETECTED
B PERT DNA SPEC QL NAA+PROBE: NOT DETECTED
BASOPHILS # BLD: 0.03 K/UL
BASOPHILS NFR BLD: 0 % (ref 0–1)
BILIRUB SERPL-MCNC: 0.3 MG/DL (ref 0–1)
BUN SERPL-MCNC: 38 MG/DL (ref 7–20)
C PNEUM DNA NPH QL NAA+NON-PROBE: NOT DETECTED
CALCIUM SERPL-MCNC: 8.3 MG/DL (ref 8.3–10.6)
CHLORIDE SERPL-SCNC: 100 MMOL/L (ref 99–110)
CO2 SERPL-SCNC: 32 MMOL/L (ref 21–32)
CREAT SERPL-MCNC: 2.8 MG/DL (ref 0.8–1.3)
CRP SERPL HS-MCNC: 158 MG/L (ref 0–5)
EKG ATRIAL RATE: 97 BPM
EKG DIAGNOSIS: NORMAL
EKG P AXIS: 88 DEGREES
EKG P-R INTERVAL: 232 MS
EKG Q-T INTERVAL: 358 MS
EKG QRS DURATION: 134 MS
EKG QTC CALCULATION (BAZETT): 454 MS
EKG R AXIS: -22 DEGREES
EKG T AXIS: 141 DEGREES
EKG VENTRICULAR RATE: 97 BPM
EOSINOPHIL # BLD: 0.1 K/UL
EOSINOPHILS RELATIVE PERCENT: 1 % (ref 0–3)
ERYTHROCYTE [DISTWIDTH] IN BLOOD BY AUTOMATED COUNT: 14.1 % (ref 11.7–14.9)
FLUAV RNA NPH QL NAA+NON-PROBE: NOT DETECTED
FLUBV RNA NPH QL NAA+NON-PROBE: NOT DETECTED
GFR, ESTIMATED: 22 ML/MIN/1.73M2
GLUCOSE SERPL-MCNC: 129 MG/DL (ref 74–99)
HADV DNA NPH QL NAA+NON-PROBE: NOT DETECTED
HCOV 229E RNA NPH QL NAA+NON-PROBE: NOT DETECTED
HCOV HKU1 RNA NPH QL NAA+NON-PROBE: NOT DETECTED
HCOV NL63 RNA NPH QL NAA+NON-PROBE: NOT DETECTED
HCOV OC43 RNA NPH QL NAA+NON-PROBE: NOT DETECTED
HCT VFR BLD AUTO: 30.9 % (ref 42–52)
HGB BLD-MCNC: 9.2 G/DL (ref 13.5–18)
HMPV RNA NPH QL NAA+NON-PROBE: NOT DETECTED
HPIV1 RNA NPH QL NAA+NON-PROBE: NOT DETECTED
HPIV2 RNA NPH QL NAA+NON-PROBE: NOT DETECTED
HPIV3 RNA NPH QL NAA+NON-PROBE: NOT DETECTED
HPIV4 RNA NPH QL NAA+NON-PROBE: NOT DETECTED
IMM GRANULOCYTES # BLD AUTO: 0.05 K/UL
IMM GRANULOCYTES NFR BLD: 1 %
INR PPP: 5.2
INR PPP: 7.5
LYMPHOCYTES NFR BLD: 2.29 K/UL
LYMPHOCYTES RELATIVE PERCENT: 22 % (ref 24–44)
M PNEUMO DNA NPH QL NAA+NON-PROBE: NOT DETECTED
MCH RBC QN AUTO: 28.7 PG (ref 27–31)
MCHC RBC AUTO-ENTMCNC: 29.8 G/DL (ref 32–36)
MCV RBC AUTO: 96.3 FL (ref 78–100)
MICROORGANISM SPEC CULT: NORMAL
MONOCYTES NFR BLD: 1.42 K/UL
MONOCYTES NFR BLD: 14 % (ref 0–4)
NEUTROPHILS NFR BLD: 62 % (ref 36–66)
NEUTS SEG NFR BLD: 6.4 K/UL
PLATELET # BLD AUTO: 128 K/UL (ref 140–440)
PMV BLD AUTO: 10.2 FL (ref 7.5–11.1)
POTASSIUM SERPL-SCNC: 4.2 MMOL/L (ref 3.5–5.1)
PROT SERPL-MCNC: 6.1 G/DL (ref 6.4–8.2)
PROTHROMBIN TIME: 48 SEC (ref 11.7–14.5)
PROTHROMBIN TIME: 63.4 SEC (ref 11.7–14.5)
RBC # BLD AUTO: 3.21 M/UL (ref 4.6–6.2)
RSV RNA NPH QL NAA+NON-PROBE: NOT DETECTED
RV+EV RNA NPH QL NAA+NON-PROBE: NOT DETECTED
SARS-COV-2 RNA NPH QL NAA+NON-PROBE: NOT DETECTED
SERVICE CMNT-IMP: NORMAL
SODIUM SERPL-SCNC: 140 MMOL/L (ref 136–145)
SPECIMEN DESCRIPTION: NORMAL
SPECIMEN DESCRIPTION: NORMAL
WBC OTHER # BLD: 10.3 K/UL (ref 4–10.5)

## 2025-02-21 PROCEDURE — 2500000003 HC RX 250 WO HCPCS: Performed by: STUDENT IN AN ORGANIZED HEALTH CARE EDUCATION/TRAINING PROGRAM

## 2025-02-21 PROCEDURE — 85610 PROTHROMBIN TIME: CPT

## 2025-02-21 PROCEDURE — 80053 COMPREHEN METABOLIC PANEL: CPT

## 2025-02-21 PROCEDURE — 6360000002 HC RX W HCPCS: Performed by: STUDENT IN AN ORGANIZED HEALTH CARE EDUCATION/TRAINING PROGRAM

## 2025-02-21 PROCEDURE — 86140 C-REACTIVE PROTEIN: CPT

## 2025-02-21 PROCEDURE — 6370000000 HC RX 637 (ALT 250 FOR IP): Performed by: STUDENT IN AN ORGANIZED HEALTH CARE EDUCATION/TRAINING PROGRAM

## 2025-02-21 PROCEDURE — 94761 N-INVAS EAR/PLS OXIMETRY MLT: CPT

## 2025-02-21 PROCEDURE — 1200000000 HC SEMI PRIVATE

## 2025-02-21 PROCEDURE — 51798 US URINE CAPACITY MEASURE: CPT

## 2025-02-21 PROCEDURE — 85025 COMPLETE CBC W/AUTO DIFF WBC: CPT

## 2025-02-21 PROCEDURE — 36415 COLL VENOUS BLD VENIPUNCTURE: CPT

## 2025-02-21 PROCEDURE — 0202U NFCT DS 22 TRGT SARS-COV-2: CPT

## 2025-02-21 PROCEDURE — 2700000000 HC OXYGEN THERAPY PER DAY

## 2025-02-21 PROCEDURE — 93010 ELECTROCARDIOGRAM REPORT: CPT | Performed by: INTERNAL MEDICINE

## 2025-02-21 RX ADMIN — SODIUM CHLORIDE, PRESERVATIVE FREE 10 ML: 5 INJECTION INTRAVENOUS at 10:00

## 2025-02-21 RX ADMIN — DOXAZOSIN 4 MG: 4 TABLET ORAL at 09:59

## 2025-02-21 RX ADMIN — Medication 10 MG: at 22:39

## 2025-02-21 RX ADMIN — AZITHROMYCIN DIHYDRATE 500 MG: 250 TABLET ORAL at 17:48

## 2025-02-21 RX ADMIN — FINASTERIDE 5 MG: 5 TABLET, FILM COATED ORAL at 10:00

## 2025-02-21 RX ADMIN — CARVEDILOL 25 MG: 25 TABLET, FILM COATED ORAL at 09:59

## 2025-02-21 RX ADMIN — WATER 1000 MG: 1 INJECTION INTRAMUSCULAR; INTRAVENOUS; SUBCUTANEOUS at 22:46

## 2025-02-21 RX ADMIN — LATANOPROST 1 DROP: 50 SOLUTION OPHTHALMIC at 22:53

## 2025-02-21 RX ADMIN — PANTOPRAZOLE SODIUM 40 MG: 40 TABLET, DELAYED RELEASE ORAL at 09:59

## 2025-02-21 RX ADMIN — CARVEDILOL 25 MG: 25 TABLET, FILM COATED ORAL at 17:48

## 2025-02-21 RX ADMIN — SODIUM CHLORIDE, PRESERVATIVE FREE 10 ML: 5 INJECTION INTRAVENOUS at 22:43

## 2025-02-21 RX ADMIN — EMPAGLIFLOZIN 10 MG: 10 TABLET, FILM COATED ORAL at 09:59

## 2025-02-21 RX ADMIN — POLYETHYLENE GLYCOL (3350) 17 G: 17 POWDER, FOR SOLUTION ORAL at 22:42

## 2025-02-21 RX ADMIN — DOCUSATE SODIUM 100 MG: 100 CAPSULE, LIQUID FILLED ORAL at 09:59

## 2025-02-21 RX ADMIN — FUROSEMIDE 40 MG: 40 TABLET ORAL at 09:59

## 2025-02-21 NOTE — CONSULTS
[facility] Nurse  Consult Note       Duarte Reyes  AGE: 89 y.o.   GENDER: male  : 1935  TODAY'S DATE:  2025    Subjective:     Reason for Evaluation and Assessment: wound care eval.      Duarte Reyes is a 89 y.o. male referred by:   [x] Physician  [] Nursing  [] Other:     Wound Identification:  Wound Type: pressure  Contributing Factors: chronic pressure, decreased mobility, malnutrition, and incontinence of urine, CKD, anticoagulation therapy        PAST MEDICAL HISTORY        Diagnosis Date    Chronic kidney disease     Heart abnormality     Hypertension     UTI (urinary tract infection)        PAST SURGICAL HISTORY    Past Surgical History:   Procedure Laterality Date    CHOLECYSTECTOMY      FRACTURE SURGERY         FAMILY HISTORY    Family History   Problem Relation Age of Onset    Kidney Disease Mother     Hypertension Mother     Diabetes Mother     Diabetes Granddaughter     Hypertension Daughter     Cancer Daughter        SOCIAL HISTORY    Social History     Tobacco Use    Smoking status: Never    Smokeless tobacco: Never   Vaping Use    Vaping status: Never Used   Substance Use Topics    Alcohol use: Never     Comment: Caffeine: 4-5 per week    Drug use: Never       ALLERGIES    No Known Allergies    MEDICATIONS    No current facility-administered medications on file prior to encounter.     Current Outpatient Medications on File Prior to Encounter   Medication Sig Dispense Refill    vitamin D 25 MCG (1000 UT) CAPS Take 1 capsule by mouth daily      carvedilol (COREG) 25 MG tablet Take 1 tablet by mouth 2 times daily (with meals) 180 tablet 1    sacubitril-valsartan (ENTRESTO) 49-51 MG per tablet Take 1 tablet by mouth 2 times daily 180 tablet 1    dapagliflozin (FARXIGA) 10 MG tablet Take 1 tablet by mouth every morning 90 tablet 3    furosemide (LASIX) 40 MG tablet Take 1 tablet by mouth daily 60 tablet 5    pantoprazole (PROTONIX) 40 MG tablet Take 1 tablet by mouth

## 2025-02-21 NOTE — PLAN OF CARE
Problem: Chronic Conditions and Co-morbidities  Goal: Patient's chronic conditions and co-morbidity symptoms are monitored and maintained or improved  Outcome: Progressing  Flowsheets (Taken 2/20/2025 1819 by Amanda Kellogg, RN)  Care Plan - Patient's Chronic Conditions and Co-Morbidity Symptoms are Monitored and Maintained or Improved:   Monitor and assess patient's chronic conditions and comorbid symptoms for stability, deterioration, or improvement   Collaborate with multidisciplinary team to address chronic and comorbid conditions and prevent exacerbation or deterioration   Update acute care plan with appropriate goals if chronic or comorbid symptoms are exacerbated and prevent overall improvement and discharge     Problem: Discharge Planning  Goal: Discharge to home or other facility with appropriate resources  Outcome: Progressing  Flowsheets (Taken 2/20/2025 1819 by Amanda Kellogg, RN)  Discharge to home or other facility with appropriate resources:   Identify barriers to discharge with patient and caregiver   Arrange for needed discharge resources and transportation as appropriate   Identify discharge learning needs (meds, wound care, etc)   Arrange for interpreters to assist at discharge as needed   Refer to discharge planning if patient needs post-hospital services based on physician order or complex needs related to functional status, cognitive ability or social support system     Problem: Pain  Goal: Verbalizes/displays adequate comfort level or baseline comfort level  Outcome: Progressing  Flowsheets (Taken 2/20/2025 1819 by Amanda Kellogg, RN)  Verbalizes/displays adequate comfort level or baseline comfort level:   Encourage patient to monitor pain and request assistance   Assess pain using appropriate pain scale   Administer analgesics based on type and severity of pain and evaluate response   Implement non-pharmacological measures as appropriate and evaluate response   Consider cultural and social

## 2025-02-22 LAB
ANION GAP SERPL CALCULATED.3IONS-SCNC: 8 MMOL/L (ref 9–17)
BUN SERPL-MCNC: 44 MG/DL (ref 7–20)
CALCIUM SERPL-MCNC: 8.4 MG/DL (ref 8.3–10.6)
CHLORIDE SERPL-SCNC: 101 MMOL/L (ref 99–110)
CO2 SERPL-SCNC: 33 MMOL/L (ref 21–32)
CREAT SERPL-MCNC: 2.4 MG/DL (ref 0.8–1.3)
CRP SERPL HS-MCNC: 122 MG/L (ref 0–5)
ERYTHROCYTE [DISTWIDTH] IN BLOOD BY AUTOMATED COUNT: 13.8 % (ref 11.7–14.9)
GFR, ESTIMATED: 25 ML/MIN/1.73M2
GLUCOSE BLD-MCNC: 124 MG/DL (ref 74–99)
GLUCOSE BLD-MCNC: 98 MG/DL (ref 74–99)
GLUCOSE SERPL-MCNC: 98 MG/DL (ref 74–99)
HCT VFR BLD AUTO: 33.4 % (ref 42–52)
HGB BLD-MCNC: 10.1 G/DL (ref 13.5–18)
INR PPP: 1.9
MAGNESIUM SERPL-MCNC: 2.3 MG/DL (ref 1.8–2.4)
MCH RBC QN AUTO: 28.9 PG (ref 27–31)
MCHC RBC AUTO-ENTMCNC: 30.2 G/DL (ref 32–36)
MCV RBC AUTO: 95.4 FL (ref 78–100)
PHOSPHATE SERPL-MCNC: 3.2 MG/DL (ref 2.5–4.9)
PLATELET # BLD AUTO: 163 K/UL (ref 140–440)
PMV BLD AUTO: 9.8 FL (ref 7.5–11.1)
POTASSIUM SERPL-SCNC: 3.8 MMOL/L (ref 3.5–5.1)
PROTHROMBIN TIME: 21.7 SEC (ref 11.7–14.5)
RBC # BLD AUTO: 3.5 M/UL (ref 4.6–6.2)
SODIUM SERPL-SCNC: 142 MMOL/L (ref 136–145)
WBC OTHER # BLD: 7.8 K/UL (ref 4–10.5)

## 2025-02-22 PROCEDURE — 97530 THERAPEUTIC ACTIVITIES: CPT

## 2025-02-22 PROCEDURE — 83735 ASSAY OF MAGNESIUM: CPT

## 2025-02-22 PROCEDURE — 36415 COLL VENOUS BLD VENIPUNCTURE: CPT

## 2025-02-22 PROCEDURE — 82962 GLUCOSE BLOOD TEST: CPT

## 2025-02-22 PROCEDURE — 97166 OT EVAL MOD COMPLEX 45 MIN: CPT

## 2025-02-22 PROCEDURE — 2500000003 HC RX 250 WO HCPCS: Performed by: STUDENT IN AN ORGANIZED HEALTH CARE EDUCATION/TRAINING PROGRAM

## 2025-02-22 PROCEDURE — 97163 PT EVAL HIGH COMPLEX 45 MIN: CPT

## 2025-02-22 PROCEDURE — 86140 C-REACTIVE PROTEIN: CPT

## 2025-02-22 PROCEDURE — 85027 COMPLETE CBC AUTOMATED: CPT

## 2025-02-22 PROCEDURE — 2700000000 HC OXYGEN THERAPY PER DAY

## 2025-02-22 PROCEDURE — 6370000000 HC RX 637 (ALT 250 FOR IP): Performed by: STUDENT IN AN ORGANIZED HEALTH CARE EDUCATION/TRAINING PROGRAM

## 2025-02-22 PROCEDURE — 1200000000 HC SEMI PRIVATE

## 2025-02-22 PROCEDURE — 6360000002 HC RX W HCPCS: Performed by: STUDENT IN AN ORGANIZED HEALTH CARE EDUCATION/TRAINING PROGRAM

## 2025-02-22 PROCEDURE — 85610 PROTHROMBIN TIME: CPT

## 2025-02-22 PROCEDURE — 80048 BASIC METABOLIC PNL TOTAL CA: CPT

## 2025-02-22 PROCEDURE — 94761 N-INVAS EAR/PLS OXIMETRY MLT: CPT

## 2025-02-22 PROCEDURE — 97535 SELF CARE MNGMENT TRAINING: CPT

## 2025-02-22 PROCEDURE — 84100 ASSAY OF PHOSPHORUS: CPT

## 2025-02-22 PROCEDURE — 6370000000 HC RX 637 (ALT 250 FOR IP): Performed by: INTERNAL MEDICINE

## 2025-02-22 RX ORDER — DIPHENHYDRAMINE HCL 25 MG
50 TABLET ORAL NIGHTLY PRN
Status: DISCONTINUED | OUTPATIENT
Start: 2025-02-22 | End: 2025-02-25 | Stop reason: HOSPADM

## 2025-02-22 RX ORDER — WARFARIN SODIUM 2 MG/1
2 TABLET ORAL
Status: COMPLETED | OUTPATIENT
Start: 2025-02-22 | End: 2025-02-22

## 2025-02-22 RX ADMIN — LATANOPROST 1 DROP: 50 SOLUTION OPHTHALMIC at 21:45

## 2025-02-22 RX ADMIN — FUROSEMIDE 40 MG: 40 TABLET ORAL at 09:08

## 2025-02-22 RX ADMIN — FINASTERIDE 5 MG: 5 TABLET, FILM COATED ORAL at 09:07

## 2025-02-22 RX ADMIN — DOCUSATE SODIUM 100 MG: 100 CAPSULE, LIQUID FILLED ORAL at 09:08

## 2025-02-22 RX ADMIN — DOXAZOSIN 4 MG: 4 TABLET ORAL at 09:08

## 2025-02-22 RX ADMIN — SODIUM CHLORIDE, PRESERVATIVE FREE 10 ML: 5 INJECTION INTRAVENOUS at 09:08

## 2025-02-22 RX ADMIN — CARVEDILOL 25 MG: 25 TABLET, FILM COATED ORAL at 09:08

## 2025-02-22 RX ADMIN — SODIUM CHLORIDE, PRESERVATIVE FREE 10 ML: 5 INJECTION INTRAVENOUS at 21:51

## 2025-02-22 RX ADMIN — PANTOPRAZOLE SODIUM 40 MG: 40 TABLET, DELAYED RELEASE ORAL at 09:08

## 2025-02-22 RX ADMIN — WATER 1000 MG: 1 INJECTION INTRAMUSCULAR; INTRAVENOUS; SUBCUTANEOUS at 21:44

## 2025-02-22 RX ADMIN — EMPAGLIFLOZIN 10 MG: 10 TABLET, FILM COATED ORAL at 12:23

## 2025-02-22 RX ADMIN — Medication 10 MG: at 21:44

## 2025-02-22 RX ADMIN — CARVEDILOL 25 MG: 25 TABLET, FILM COATED ORAL at 18:19

## 2025-02-22 RX ADMIN — WARFARIN SODIUM 2 MG: 2 TABLET ORAL at 18:19

## 2025-02-22 RX ADMIN — DIPHENHYDRAMINE HYDROCHLORIDE 50 MG: 25 TABLET ORAL at 21:51

## 2025-02-22 RX ADMIN — AZITHROMYCIN DIHYDRATE 500 MG: 250 TABLET ORAL at 18:19

## 2025-02-22 NOTE — PLAN OF CARE
Problem: Chronic Conditions and Co-morbidities  Goal: Patient's chronic conditions and co-morbidity symptoms are monitored and maintained or improved  Outcome: Progressing     Problem: Discharge Planning  Goal: Discharge to home or other facility with appropriate resources  Outcome: Progressing     Problem: Pain  Goal: Verbalizes/displays adequate comfort level or baseline comfort level  Outcome: Progressing     Problem: Safety - Adult  Goal: Free from fall injury  Outcome: Progressing     Problem: ABCDS Injury Assessment  Goal: Absence of physical injury  Outcome: Progressing     Problem: Skin/Tissue Integrity  Goal: Skin integrity remains intact  Description: 1.  Monitor for areas of redness and/or skin breakdown  2.  Assess vascular access sites hourly  3.  Every 4-6 hours minimum:  Change oxygen saturation probe site  4.  Every 4-6 hours:  If on nasal continuous positive airway pressure, respiratory therapy assess nares and determine need for appliance change or resting period  Outcome: Progressing  Flowsheets (Taken 2/21/2025 2300)  Skin Integrity Remains Intact: Monitor for areas of redness and/or skin breakdown     Problem: Nutrition Deficit:  Goal: Optimize nutritional status  Outcome: Progressing     Problem: Chronic Conditions and Co-morbidities  Goal: Patient's chronic conditions and co-morbidity symptoms are monitored and maintained or improved  Outcome: Progressing     Problem: Discharge Planning  Goal: Discharge to home or other facility with appropriate resources  Outcome: Progressing     Problem: Pain  Goal: Verbalizes/displays adequate comfort level or baseline comfort level  Outcome: Progressing     Problem: Safety - Adult  Goal: Free from fall injury  Outcome: Progressing     Problem: ABCDS Injury Assessment  Goal: Absence of physical injury  Outcome: Progressing     Problem: Skin/Tissue Integrity  Goal: Skin integrity remains intact  Description: 1.  Monitor for areas of redness and/or skin

## 2025-02-23 LAB
ANION GAP SERPL CALCULATED.3IONS-SCNC: 10 MMOL/L (ref 9–17)
BUN SERPL-MCNC: 42 MG/DL (ref 7–20)
CALCIUM SERPL-MCNC: 8.3 MG/DL (ref 8.3–10.6)
CHLORIDE SERPL-SCNC: 102 MMOL/L (ref 99–110)
CO2 SERPL-SCNC: 31 MMOL/L (ref 21–32)
CREAT SERPL-MCNC: 2.3 MG/DL (ref 0.8–1.3)
ERYTHROCYTE [DISTWIDTH] IN BLOOD BY AUTOMATED COUNT: 13.7 % (ref 11.7–14.9)
GFR, ESTIMATED: 27 ML/MIN/1.73M2
GLUCOSE SERPL-MCNC: 111 MG/DL (ref 74–99)
HCT VFR BLD AUTO: 33.6 % (ref 42–52)
HGB BLD-MCNC: 9.8 G/DL (ref 13.5–18)
INR PPP: 1.4
MAGNESIUM SERPL-MCNC: 2.3 MG/DL (ref 1.8–2.4)
MCH RBC QN AUTO: 28.7 PG (ref 27–31)
MCHC RBC AUTO-ENTMCNC: 29.2 G/DL (ref 32–36)
MCV RBC AUTO: 98.5 FL (ref 78–100)
PHOSPHATE SERPL-MCNC: 3 MG/DL (ref 2.5–4.9)
PLATELET # BLD AUTO: 179 K/UL (ref 140–440)
PMV BLD AUTO: 10 FL (ref 7.5–11.1)
POTASSIUM SERPL-SCNC: 4.1 MMOL/L (ref 3.5–5.1)
PROTHROMBIN TIME: 17.2 SEC (ref 11.7–14.5)
RBC # BLD AUTO: 3.41 M/UL (ref 4.6–6.2)
SODIUM SERPL-SCNC: 143 MMOL/L (ref 136–145)
WBC OTHER # BLD: 7.7 K/UL (ref 4–10.5)

## 2025-02-23 PROCEDURE — 6370000000 HC RX 637 (ALT 250 FOR IP): Performed by: STUDENT IN AN ORGANIZED HEALTH CARE EDUCATION/TRAINING PROGRAM

## 2025-02-23 PROCEDURE — 85610 PROTHROMBIN TIME: CPT

## 2025-02-23 PROCEDURE — 80048 BASIC METABOLIC PNL TOTAL CA: CPT

## 2025-02-23 PROCEDURE — 97530 THERAPEUTIC ACTIVITIES: CPT

## 2025-02-23 PROCEDURE — 6370000000 HC RX 637 (ALT 250 FOR IP): Performed by: INTERNAL MEDICINE

## 2025-02-23 PROCEDURE — 94761 N-INVAS EAR/PLS OXIMETRY MLT: CPT

## 2025-02-23 PROCEDURE — 1200000000 HC SEMI PRIVATE

## 2025-02-23 PROCEDURE — 97542 WHEELCHAIR MNGMENT TRAINING: CPT

## 2025-02-23 PROCEDURE — 84100 ASSAY OF PHOSPHORUS: CPT

## 2025-02-23 PROCEDURE — 85027 COMPLETE CBC AUTOMATED: CPT

## 2025-02-23 PROCEDURE — 2500000003 HC RX 250 WO HCPCS: Performed by: STUDENT IN AN ORGANIZED HEALTH CARE EDUCATION/TRAINING PROGRAM

## 2025-02-23 PROCEDURE — 83735 ASSAY OF MAGNESIUM: CPT

## 2025-02-23 PROCEDURE — 2700000000 HC OXYGEN THERAPY PER DAY

## 2025-02-23 PROCEDURE — 36415 COLL VENOUS BLD VENIPUNCTURE: CPT

## 2025-02-23 RX ORDER — WARFARIN SODIUM 2 MG/1
2 TABLET ORAL
Status: COMPLETED | OUTPATIENT
Start: 2025-02-23 | End: 2025-02-23

## 2025-02-23 RX ORDER — AMOXICILLIN AND CLAVULANATE POTASSIUM 500; 125 MG/1; MG/1
1 TABLET, FILM COATED ORAL EVERY 12 HOURS SCHEDULED
Status: DISCONTINUED | OUTPATIENT
Start: 2025-02-23 | End: 2025-02-25 | Stop reason: HOSPADM

## 2025-02-23 RX ADMIN — FINASTERIDE 5 MG: 5 TABLET, FILM COATED ORAL at 09:30

## 2025-02-23 RX ADMIN — LATANOPROST 1 DROP: 50 SOLUTION OPHTHALMIC at 21:06

## 2025-02-23 RX ADMIN — DOXAZOSIN 4 MG: 4 TABLET ORAL at 09:30

## 2025-02-23 RX ADMIN — PANTOPRAZOLE SODIUM 40 MG: 40 TABLET, DELAYED RELEASE ORAL at 09:30

## 2025-02-23 RX ADMIN — Medication 10 MG: at 21:06

## 2025-02-23 RX ADMIN — DOCUSATE SODIUM 100 MG: 100 CAPSULE, LIQUID FILLED ORAL at 09:30

## 2025-02-23 RX ADMIN — SODIUM CHLORIDE, PRESERVATIVE FREE 10 ML: 5 INJECTION INTRAVENOUS at 21:07

## 2025-02-23 RX ADMIN — EMPAGLIFLOZIN 10 MG: 10 TABLET, FILM COATED ORAL at 09:30

## 2025-02-23 RX ADMIN — FUROSEMIDE 40 MG: 40 TABLET ORAL at 09:30

## 2025-02-23 RX ADMIN — AMOXICILLIN AND CLAVULANATE POTASSIUM 1 TABLET: 500; 125 TABLET, FILM COATED ORAL at 14:07

## 2025-02-23 RX ADMIN — CARVEDILOL 25 MG: 25 TABLET, FILM COATED ORAL at 17:40

## 2025-02-23 RX ADMIN — WARFARIN SODIUM 2 MG: 2 TABLET ORAL at 17:40

## 2025-02-23 RX ADMIN — CARVEDILOL 25 MG: 25 TABLET, FILM COATED ORAL at 09:30

## 2025-02-23 ASSESSMENT — PAIN SCALES - GENERAL: PAINLEVEL_OUTOF10: 0

## 2025-02-24 LAB
ANION GAP SERPL CALCULATED.3IONS-SCNC: 9 MMOL/L (ref 9–17)
BUN SERPL-MCNC: 44 MG/DL (ref 7–20)
CALCIUM SERPL-MCNC: 8.4 MG/DL (ref 8.3–10.6)
CHLORIDE SERPL-SCNC: 102 MMOL/L (ref 99–110)
CO2 SERPL-SCNC: 34 MMOL/L (ref 21–32)
CREAT SERPL-MCNC: 2.1 MG/DL (ref 0.8–1.3)
ERYTHROCYTE [DISTWIDTH] IN BLOOD BY AUTOMATED COUNT: 13.5 % (ref 11.7–14.9)
GFR, ESTIMATED: 29 ML/MIN/1.73M2
GLUCOSE SERPL-MCNC: 139 MG/DL (ref 74–99)
HCT VFR BLD AUTO: 33.4 % (ref 42–52)
HGB BLD-MCNC: 9.9 G/DL (ref 13.5–18)
INR PPP: 1.2
MAGNESIUM SERPL-MCNC: 2.6 MG/DL (ref 1.8–2.4)
MCH RBC QN AUTO: 28.7 PG (ref 27–31)
MCHC RBC AUTO-ENTMCNC: 29.6 G/DL (ref 32–36)
MCV RBC AUTO: 96.8 FL (ref 78–100)
PHOSPHATE SERPL-MCNC: 2.4 MG/DL (ref 2.5–4.9)
PLATELET # BLD AUTO: 201 K/UL (ref 140–440)
PMV BLD AUTO: 10.3 FL (ref 7.5–11.1)
POTASSIUM SERPL-SCNC: 3.8 MMOL/L (ref 3.5–5.1)
PROTHROMBIN TIME: 15.5 SEC (ref 11.7–14.5)
RBC # BLD AUTO: 3.45 M/UL (ref 4.6–6.2)
SODIUM SERPL-SCNC: 145 MMOL/L (ref 136–145)
WBC OTHER # BLD: 7.5 K/UL (ref 4–10.5)

## 2025-02-24 PROCEDURE — 6370000000 HC RX 637 (ALT 250 FOR IP): Performed by: INTERNAL MEDICINE

## 2025-02-24 PROCEDURE — 6370000000 HC RX 637 (ALT 250 FOR IP): Performed by: STUDENT IN AN ORGANIZED HEALTH CARE EDUCATION/TRAINING PROGRAM

## 2025-02-24 PROCEDURE — 85610 PROTHROMBIN TIME: CPT

## 2025-02-24 PROCEDURE — 85027 COMPLETE CBC AUTOMATED: CPT

## 2025-02-24 PROCEDURE — 1200000000 HC SEMI PRIVATE

## 2025-02-24 PROCEDURE — 36415 COLL VENOUS BLD VENIPUNCTURE: CPT

## 2025-02-24 PROCEDURE — 93005 ELECTROCARDIOGRAM TRACING: CPT | Performed by: STUDENT IN AN ORGANIZED HEALTH CARE EDUCATION/TRAINING PROGRAM

## 2025-02-24 PROCEDURE — 94761 N-INVAS EAR/PLS OXIMETRY MLT: CPT

## 2025-02-24 PROCEDURE — 84100 ASSAY OF PHOSPHORUS: CPT

## 2025-02-24 PROCEDURE — 2500000003 HC RX 250 WO HCPCS: Performed by: STUDENT IN AN ORGANIZED HEALTH CARE EDUCATION/TRAINING PROGRAM

## 2025-02-24 PROCEDURE — 80048 BASIC METABOLIC PNL TOTAL CA: CPT

## 2025-02-24 PROCEDURE — 83735 ASSAY OF MAGNESIUM: CPT

## 2025-02-24 RX ORDER — WARFARIN SODIUM 2 MG/1
4 TABLET ORAL
Status: COMPLETED | OUTPATIENT
Start: 2025-02-24 | End: 2025-02-24

## 2025-02-24 RX ORDER — CALCIUM CARBONATE 500 MG/1
500 TABLET, CHEWABLE ORAL 3 TIMES DAILY PRN
Status: DISCONTINUED | OUTPATIENT
Start: 2025-02-24 | End: 2025-02-25 | Stop reason: HOSPADM

## 2025-02-24 RX ADMIN — DOXAZOSIN 4 MG: 4 TABLET ORAL at 08:51

## 2025-02-24 RX ADMIN — POTASSIUM & SODIUM PHOSPHATES POWDER PACK 280-160-250 MG 250 MG: 280-160-250 PACK at 11:14

## 2025-02-24 RX ADMIN — AMOXICILLIN AND CLAVULANATE POTASSIUM 1 TABLET: 500; 125 TABLET, FILM COATED ORAL at 08:51

## 2025-02-24 RX ADMIN — FUROSEMIDE 40 MG: 40 TABLET ORAL at 08:51

## 2025-02-24 RX ADMIN — CARVEDILOL 25 MG: 25 TABLET, FILM COATED ORAL at 17:09

## 2025-02-24 RX ADMIN — LATANOPROST 1 DROP: 50 SOLUTION OPHTHALMIC at 21:17

## 2025-02-24 RX ADMIN — WARFARIN SODIUM 4 MG: 2 TABLET ORAL at 17:09

## 2025-02-24 RX ADMIN — SACUBITRIL AND VALSARTAN 1 TABLET: 49; 51 TABLET, FILM COATED ORAL at 21:18

## 2025-02-24 RX ADMIN — CALCIUM CARBONATE 500 MG: 500 TABLET, CHEWABLE ORAL at 17:36

## 2025-02-24 RX ADMIN — AMOXICILLIN AND CLAVULANATE POTASSIUM 1 TABLET: 500; 125 TABLET, FILM COATED ORAL at 21:18

## 2025-02-24 RX ADMIN — FINASTERIDE 5 MG: 5 TABLET, FILM COATED ORAL at 08:51

## 2025-02-24 RX ADMIN — SODIUM CHLORIDE, PRESERVATIVE FREE 10 ML: 5 INJECTION INTRAVENOUS at 08:52

## 2025-02-24 RX ADMIN — EMPAGLIFLOZIN 10 MG: 10 TABLET, FILM COATED ORAL at 08:51

## 2025-02-24 RX ADMIN — DOCUSATE SODIUM 100 MG: 100 CAPSULE, LIQUID FILLED ORAL at 08:51

## 2025-02-24 RX ADMIN — CARVEDILOL 25 MG: 25 TABLET, FILM COATED ORAL at 08:51

## 2025-02-24 RX ADMIN — PANTOPRAZOLE SODIUM 40 MG: 40 TABLET, DELAYED RELEASE ORAL at 08:51

## 2025-02-24 RX ADMIN — SODIUM CHLORIDE, PRESERVATIVE FREE 10 ML: 5 INJECTION INTRAVENOUS at 21:18

## 2025-02-24 RX ADMIN — Medication 10 MG: at 21:17

## 2025-02-24 ASSESSMENT — PAIN SCALES - GENERAL: PAINLEVEL_OUTOF10: 0

## 2025-02-24 NOTE — CARE COORDINATION
CM reviewed pt’s medical record and discussed pt in IDR. CM introduced self and explained the role of case management. CM in to see pt to initiate D/C planning. CM received permission from pt to speak freely in front of family. PTA pt was from home with adult children. Pt received assistance with mobility and ADLs. Pt has a PCP, insurance, and able to afford medications. Pt's family provides transportation to appointments. CM discussed PT/OT process/recommendations. Pt and family are agreeable to SNF placement at discharge. Pt and family [facility] of provider is [facility]. [facility]. Referral made with [facility]. Plan for discharge is SNF. Referral to [facility]. Awaiting return call. Pt denies having any other DC needs.  The patient's individualized treatment goals were discussed and they are in agreement with the transitional plan of care. The patient was provided with a choice in provider and understands the freedom of choice list that was provided to them. The patient and/or family verbalize understanding of treatment preferences and quality data associated with providers.       02/24/25 1009   Service Assessment   Patient Orientation Alert and Oriented;Person;Situation;Self   Cognition Alert   History Provided By Patient;Child/Family;Medical Record   Primary Caregiver Family   Accompanied By/Relationship daughter, Jennifer   Support Systems Children;Family Members   Patient's Healthcare Decision Maker is: Legal Next of Kin   PCP Verified by BALAJI Yes   Prior Functional Level Assistance with the following:;Cooking;Housework;Mobility;Shopping   Current Functional Level Assistance with the following:;Bathing;Toileting;Mobility   Can patient return to prior living arrangement Unknown at present   Ability to make needs known: Fair   Family able to assist with home care needs: Yes   Would you like for me to discuss the discharge plan with any other family members/significant others, and if so, who?

## 2025-02-24 NOTE — PLAN OF CARE
Problem: Chronic Conditions and Co-morbidities  Goal: Patient's chronic conditions and co-morbidity symptoms are monitored and maintained or improved  Outcome: Progressing     Problem: Discharge Planning  Goal: Discharge to home or other facility with appropriate resources  Outcome: Progressing     Problem: Pain  Goal: Verbalizes/displays adequate comfort level or baseline comfort level  Outcome: Progressing     Problem: Safety - Adult  Goal: Free from fall injury  Outcome: Progressing     Problem: ABCDS Injury Assessment  Goal: Absence of physical injury  Outcome: Progressing     Problem: Skin/Tissue Integrity  Goal: Skin integrity remains intact  Description: 1.  Monitor for areas of redness and/or skin breakdown  2.  Assess vascular access sites hourly  3.  Every 4-6 hours minimum:  Change oxygen saturation probe site  4.  Every 4-6 hours:  If on nasal continuous positive airway pressure, respiratory therapy assess nares and determine need for appliance change or resting period  Outcome: Progressing     Problem: Nutrition Deficit:  Goal: Optimize nutritional status  Outcome: Progressing

## 2025-02-24 NOTE — PLAN OF CARE
Problem: Chronic Conditions and Co-morbidities  Goal: Patient's chronic conditions and co-morbidity symptoms are monitored and maintained or improved  Outcome: Progressing     Problem: Discharge Planning  Goal: Discharge to home or other facility with appropriate resources  Outcome: Progressing     Problem: Pain  Goal: Verbalizes/displays adequate comfort level or baseline comfort level  Outcome: Progressing     Problem: Safety - Adult  Goal: Free from fall injury  Outcome: Progressing     Problem: ABCDS Injury Assessment  Goal: Absence of physical injury  Outcome: Progressing     Problem: Skin/Tissue Integrity  Goal: Skin integrity remains intact  Outcome: Progressing     Problem: Nutrition Deficit:  Goal: Optimize nutritional status  Outcome: Progressing

## 2025-02-25 VITALS
HEIGHT: 77 IN | SYSTOLIC BLOOD PRESSURE: 154 MMHG | BODY MASS INDEX: 26.11 KG/M2 | TEMPERATURE: 97.7 F | HEART RATE: 86 BPM | OXYGEN SATURATION: 96 % | DIASTOLIC BLOOD PRESSURE: 88 MMHG | WEIGHT: 221.12 LBS | RESPIRATION RATE: 18 BRPM

## 2025-02-25 LAB
EKG ATRIAL RATE: 78 BPM
EKG DIAGNOSIS: NORMAL
EKG P AXIS: 74 DEGREES
EKG P-R INTERVAL: 192 MS
EKG Q-T INTERVAL: 410 MS
EKG QRS DURATION: 158 MS
EKG QTC CALCULATION (BAZETT): 467 MS
EKG R AXIS: 60 DEGREES
EKG T AXIS: 133 DEGREES
EKG VENTRICULAR RATE: 78 BPM
INR PPP: 1.1
MICROORGANISM SPEC CULT: NORMAL
MICROORGANISM SPEC CULT: NORMAL
PROTHROMBIN TIME: 14.1 SEC (ref 11.7–14.5)
SERVICE CMNT-IMP: NORMAL
SERVICE CMNT-IMP: NORMAL
SPECIMEN DESCRIPTION: NORMAL
SPECIMEN DESCRIPTION: NORMAL

## 2025-02-25 PROCEDURE — 6370000000 HC RX 637 (ALT 250 FOR IP): Performed by: INTERNAL MEDICINE

## 2025-02-25 PROCEDURE — 2500000003 HC RX 250 WO HCPCS: Performed by: STUDENT IN AN ORGANIZED HEALTH CARE EDUCATION/TRAINING PROGRAM

## 2025-02-25 PROCEDURE — 36415 COLL VENOUS BLD VENIPUNCTURE: CPT

## 2025-02-25 PROCEDURE — 94761 N-INVAS EAR/PLS OXIMETRY MLT: CPT

## 2025-02-25 PROCEDURE — 85610 PROTHROMBIN TIME: CPT

## 2025-02-25 PROCEDURE — 2700000000 HC OXYGEN THERAPY PER DAY

## 2025-02-25 PROCEDURE — 6370000000 HC RX 637 (ALT 250 FOR IP): Performed by: STUDENT IN AN ORGANIZED HEALTH CARE EDUCATION/TRAINING PROGRAM

## 2025-02-25 PROCEDURE — 6360000002 HC RX W HCPCS: Performed by: STUDENT IN AN ORGANIZED HEALTH CARE EDUCATION/TRAINING PROGRAM

## 2025-02-25 RX ORDER — AMOXICILLIN AND CLAVULANATE POTASSIUM 500; 125 MG/1; MG/1
1 TABLET, FILM COATED ORAL EVERY 12 HOURS SCHEDULED
Qty: 4 TABLET | Refills: 0 | Status: SHIPPED | OUTPATIENT
Start: 2025-02-25 | End: 2025-02-27

## 2025-02-25 RX ADMIN — PANTOPRAZOLE SODIUM 40 MG: 40 TABLET, DELAYED RELEASE ORAL at 08:52

## 2025-02-25 RX ADMIN — CARVEDILOL 25 MG: 25 TABLET, FILM COATED ORAL at 08:53

## 2025-02-25 RX ADMIN — AMOXICILLIN AND CLAVULANATE POTASSIUM 1 TABLET: 500; 125 TABLET, FILM COATED ORAL at 08:53

## 2025-02-25 RX ADMIN — EMPAGLIFLOZIN 10 MG: 10 TABLET, FILM COATED ORAL at 08:53

## 2025-02-25 RX ADMIN — DOCUSATE SODIUM 100 MG: 100 CAPSULE, LIQUID FILLED ORAL at 08:53

## 2025-02-25 RX ADMIN — DOXAZOSIN 4 MG: 4 TABLET ORAL at 08:56

## 2025-02-25 RX ADMIN — ONDANSETRON 4 MG: 2 INJECTION INTRAMUSCULAR; INTRAVENOUS at 03:53

## 2025-02-25 RX ADMIN — FINASTERIDE 5 MG: 5 TABLET, FILM COATED ORAL at 08:53

## 2025-02-25 RX ADMIN — SACUBITRIL AND VALSARTAN 1 TABLET: 49; 51 TABLET, FILM COATED ORAL at 08:53

## 2025-02-25 RX ADMIN — FUROSEMIDE 40 MG: 40 TABLET ORAL at 08:53

## 2025-02-25 RX ADMIN — SODIUM CHLORIDE, PRESERVATIVE FREE 10 ML: 5 INJECTION INTRAVENOUS at 08:53

## 2025-02-25 NOTE — DISCHARGE SUMMARY
V2.0  Discharge Summary    Name:  Duarte Reyes /Age/Sex: 1935 (89 y.o. male)   Admit Date: 2025  Discharge Date: 25    MRN & CSN:  [id number] & [id number] Encounter Date and Time 25 10:56 AM EST    Attending:  Rohan Escalona MD Discharging Provider: Rohan Escalona MD       Hospital Course:     Brief HPI:   Duarte Reyes is a 89 y.o. male who presents with  increased fatigue and weakness over the last week.  Patient is blind and gets care from his daughter and son-in-law.  They state over the last week he has had decreased appetite, and has had nausea and vomiting for the last day.  He denies any current pain but states that he had abdominal pain yesterday.  Family reports his last bowel movement was approximately 5 days ago.  Has had a cough for the last week as well with episodes of large amount of sputum production.He has had decreased urinary output.  No fevers.  Denies any chest pain. Family unable to take care of the patient as he has gotten more weak. Patient interested in rehab followed by possible DC to assisted living. This was dicussed with ED CM. He got Cefepime in ED. Will change to rocpehin and azithro       Brief Problem Based Course:     Sepsis - POA  CAP  Generalized Weakness               Initial RR > 20, WBC > 12, CXR with Pneumonia               On rocephin and s/p azithromycin               Will complete antibiotic course with Augmentin              LA -ve                 PAF               On Coreg              On warfarin at home      Supratherapeutic INR warfarin resumed.              INR 7.5 this admission                            Apparently, given vitamin K this admission                           Warfarin resumed.  Titrate up to appropriate INR     Non-MI Troponin Elevation      CKD IIIb              Baseline Cr 2.3-2.8               Nephro was on board      H/O HTN, Ischemic Cardiomyopathy               On lasix PO, jardiance, coreg     BPH  B/L

## 2025-02-25 NOTE — DISCHARGE INSTR - COC
Continuity of Care Form    Patient Name: Duarte Reyes   :  1935  MRN:  [id number]    Admit date:  2025  Discharge date:  2025    Code Status Order: Full Code   Advance Directives:   Advance Care Flowsheet Documentation             Admitting Physician:  Maxim Wall MD  PCP: Jose Napoles MD    Discharging Nurse: REGLA Domingo  Discharging Hospital Unit/Room#: 1124/1124-A  Discharging Unit Phone Number: [phone]    Emergency Contact:   Extended Emergency Contact Information  Primary Emergency Contact: Shasha Mckeon  Home Phone: [phone]  Mobile Phone: [phone]  Relation: Child  Preferred language: English  Secondary Emergency Contact: JOE MCKEON  Home Phone: [phone]  Mobile Phone: [phone]  Relation: Child    Past Surgical History:  Past Surgical History:   Procedure Laterality Date    CHOLECYSTECTOMY      FRACTURE SURGERY         Immunization History:   Immunization History   Administered Date(s) Administered    Influenza, FLUZONE High Dose (age 65 y+), IM, Quadv, 0.7mL 10/23/2023       Active Problems:  Patient Active Problem List   Diagnosis Code    BPH (benign prostatic hyperplasia) N40.0    Hypertension I10    LBBB (left bundle branch block) I44.7    SOB (shortness of breath) R06.02    Paroxysmal atrial fibrillation (HCC) I48.0    Acute on chronic diastolic congestive heart failure (HCC) I50.33    Stage 3a chronic kidney disease (HCC) N18.31    Legally blind H54.8    Precordial pain R07.2    CKD (chronic kidney disease) stage 4, 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

## 2025-02-25 NOTE — CARE COORDINATION
CM reviewed pt's medical record. [facility] that [facility] when pt is medically ready.CM called pt's daughter Jennifer and she is aware that [facility] not [facility] beds, but [facility] able to [facility] pt.     2/25/2025 11:41 AM--CM noted discharge order and set transportation for 1:45 PM, which was [facility] first available time slot. CM called and notified family--daughter Jennifer. CM notified [facility] of discharge order and  time.    Discharging Nurse: Pt will discharge to [facility].    Call report to [phone]    Fax AVS with completed BARBY and any Scripts to [phone]; alt FAX # [phone].     If pt is discharging on any narcotics/controlled medications a script signed in ink is required for the facility.

## 2025-02-25 NOTE — PLAN OF CARE
Problem: Chronic Conditions and Co-morbidities  Goal: Patient's chronic conditions and co-morbidity symptoms are monitored and maintained or improved  2/25/2025 0037 by Rebel Narayanan RN  Outcome: Progressing  2/24/2025 1352 by Katie Umana RN  Outcome: Progressing     Problem: Discharge Planning  Goal: Discharge to home or other facility with appropriate resources  2/25/2025 0037 by Rebel Narayanan RN  Outcome: Progressing  2/24/2025 1352 by Katie Umana RN  Outcome: Progressing     Problem: Pain  Goal: Verbalizes/displays adequate comfort level or baseline comfort level  2/25/2025 0037 by Rebel Narayanan RN  Outcome: Progressing  2/24/2025 1352 by Katie Umana RN  Outcome: Progressing     Problem: Safety - Adult  Goal: Free from fall injury  2/25/2025 0037 by Rebel Narayanan RN  Outcome: Progressing  2/24/2025 1352 by Katie Umana RN  Outcome: Progressing     Problem: ABCDS Injury Assessment  Goal: Absence of physical injury  2/25/2025 0037 by Rebel Narayanan RN  Outcome: Progressing  2/24/2025 1352 by Katie Umana RN  Outcome: Progressing     Problem: Skin/Tissue Integrity  Goal: Skin integrity remains intact  Description: 1.  Monitor for areas of redness and/or skin breakdown  2.  Assess vascular access sites hourly  3.  Every 4-6 hours minimum:  Change oxygen saturation probe site  4.  Every 4-6 hours:  If on nasal continuous positive airway pressure, respiratory therapy assess nares and determine need for appliance change or resting period  2/25/2025 0037 by Rebel Narayanan RN  Outcome: Progressing  2/24/2025 1352 by Katie Umana RN  Outcome: Progressing     Problem: Nutrition Deficit:  Goal: Optimize nutritional status  2/25/2025 0037 by Rebel Narayanan RN  Outcome: Progressing  2/24/2025 1352 by Katie Umana RN  Outcome: Progressing

## 2025-02-25 NOTE — PROGRESS NOTES
In-Patient Progress Note    Patient:  Duarte Reyes 89 y.o. male MRN: [id number]     Date of Service: 2/22/2025    Hospital Day: 3      Chief complaint: had concerns including Fatigue (Pt states he has been weak for a few years- pt from home with daughter and RAVEN. Pt states he couldn't get up to go to bathroom or shower. Pt is very vague- poor historian. ).      Subjective   Patient seen and examined in the morning. Patient states he feels pretty good today. Breathing better.       See ROS    I have reviewed all pertinent PMHx, PSHx, FamHx, SocialHx, medications, and allergies and updated history as appropriate. I have reviewed images as appropriate.     Assessment and Plan   Duarte Reyes, a 89 y.o. male, with a history of CKD IIIb, HTN, B/L blindness, ICM, PAF, BPH was admitted on 2/20/2025 with complaints of had concerns including Fatigue (Pt states he has been weak for a few years- pt from home with daughter and RAVEN. Pt states he couldn't get up to go to bathroom or shower. Pt is very vague- poor historian. ).    Assessment  Sepsis - POA  CAP  Generalized Weakness    Initial RR > 20, WBC > 12, CXR with Pneumonia    On rocephin and azithromycin    LA -ve    PT/OT to see pt. Today     PAF    On Coreg   On warfarin at home     Supratherapeutic INR   INR 7.5 this admission      Apparently, given vitamin K overnight     INR 1.9 today     Non-MI Troponin Elevation     CKD IIIb   Baseline Cr 2.4-2.8    Nephro on board     H/O HTN, Ischemic Cardiomyopathy    On lasix PO, jardiance, coreg    BPH  B/L Blindness  Wheelchair bound         # Peptic ulcer prophylaxis:  Protonix  # DVT Prophylaxis: Warfarin   Shasha, daughter, to help with decision making       Expected Disposition: TBD - SNF  Estimated discharge date: Medically ready to d/c possibly tomorrow     Personally reviewed Lab Studies and Imaging     Discussed with nephrology - likely ok to d/c tomorrow. Cr down trending.     Drugs that require monitoring for 
    In-Patient Progress Note    Patient:  Duarte Reyes 89 y.o. male MRN: [id number]     Date of Service: 2/23/2025    Hospital Day: 4      Chief complaint: had concerns including Fatigue (Pt states he has been weak for a few years- pt from home with daughter and RAVEN. Pt states he couldn't get up to go to bathroom or shower. Pt is very vague- poor historian. ).      Subjective   Patient seen and examined in the morning. Patient states he feels ok. No new complaints. Family at bedside.       See ROS    I have reviewed all pertinent PMHx, PSHx, FamHx, SocialHx, medications, and allergies and updated history as appropriate. I have reviewed images as appropriate.     Assessment and Plan   Duarte Reyes, a 89 y.o. male, with a history of CKD IIIb, HTN, B/L blindness, ICM, PAF, BPH was admitted on 2/20/2025 with complaints of had concerns including Fatigue (Pt states he has been weak for a few years- pt from home with daughter and RAVEN. Pt states he couldn't get up to go to bathroom or shower. Pt is very vague- poor historian. ).    Assessment  Sepsis - POA  CAP  Generalized Weakness    Initial RR > 20, WBC > 12, CXR with Pneumonia    On rocephin and s/p azithromycin    Change to PO augmentin X 4 more days   LA -ve      PAF    On Coreg   On warfarin at home     Supratherapeutic INR   INR 7.5 this admission      Apparently, given vitamin K this admission     INR 1.4 today     Warfarin dosing per pharmacy     Non-MI Troponin Elevation     CKD IIIb   Baseline Cr 2.3-2.8    Nephro on board     H/O HTN, Ischemic Cardiomyopathy    On lasix PO, jardiance, coreg    BPH  B/L Blindness  Wheelchair bound         # Peptic ulcer prophylaxis:  Protonix  # DVT Prophylaxis: Warfarin   Shasha, daughter, to help with decision making       Expected Disposition: TBD - SNF  Estimated discharge date: Medically ready to d/c tomorrow once INR is uptrending.     Personally reviewed Lab Studies and Imaging       Drugs that require monitoring for 
  Nephrology Progress Note  2/21/2025 8:43 AM  Subjective:     Interval History: Duarte Reyes is a 89 y.o. male with tired and weak but overall doing about the same or better no distress        Data:   Scheduled Meds:   sodium chloride flush  5-40 mL IntraVENous 2 times per day    cefTRIAXone (ROCEPHIN) IV  1,000 mg IntraVENous Q24H    And    azithromycin  500 mg Oral Q24H    carvedilol  25 mg Oral BID WC    empagliflozin  10 mg Oral Daily    docusate sodium  100 mg Oral Daily    finasteride  5 mg Oral Daily    furosemide  40 mg Oral Daily    latanoprost  1 drop Right Eye Nightly    melatonin  10 mg Oral Nightly    pantoprazole  40 mg Oral Daily    [Held by provider] sacubitril-valsartan  1 tablet Oral BID    doxazosin  4 mg Oral Daily    warfarin placeholder: dosing by pharmacy   Oral RX Placeholder     Continuous Infusions:   sodium chloride           CBC   Recent Labs     02/20/25 1007 02/21/25 0228   WBC 12.8* 10.3   HGB 10.9* 9.2*   HCT 35.6* 30.9*    128*      BMP   Recent Labs     02/20/25 1007 02/21/25 0228    140   K 4.3 4.2   CL 95* 100   CO2 29 32   BUN 34* 38*   CREATININE 2.8* 2.8*     Hepatic:   Recent Labs     02/20/25 1007 02/21/25 0228   AST 22 17   ALT 7* <5*   BILITOT 0.8 0.3   ALKPHOS 63 61     Troponin: No results for input(s): \"TROPONINI\" in the last 72 hours.  BNP: No results for input(s): \"BNP\" in the last 72 hours.  Lipids: No results for input(s): \"CHOL\", \"HDL\" in the last 72 hours.    Invalid input(s): \"LDLCALCU\"  ABGs: No results found for: \"PHART\", \"PO2ART\", \"USO9ZML\"  INR:   Recent Labs     02/20/25  1840 02/21/25 0228   INR 6.8* 7.5*     Renal Labs  Albumin:  No results found for: \"LABALBU\"  Calcium:    Lab Results   Component Value Date/Time    CALCIUM 8.3 02/21/2025 02:28 AM     Phosphorus:    Lab Results   Component Value Date/Time    PHOS 3.0 02/11/2025 09:21 AM     U/A:    Lab Results   Component Value Date/Time    NITRU NEGATIVE 02/20/2025 12:00 PM    COLORU 
  Nephrology Progress Note  2/22/2025 9:25 AM  Subjective:     Interval History: Duarte Reyes is a 89 y.o. male who appears to be doing okay in general weak resting in bed      Data:   Scheduled Meds:   sodium chloride flush  5-40 mL IntraVENous 2 times per day    cefTRIAXone (ROCEPHIN) IV  1,000 mg IntraVENous Q24H    And    azithromycin  500 mg Oral Q24H    carvedilol  25 mg Oral BID WC    empagliflozin  10 mg Oral Daily    docusate sodium  100 mg Oral Daily    finasteride  5 mg Oral Daily    furosemide  40 mg Oral Daily    latanoprost  1 drop Right Eye Nightly    melatonin  10 mg Oral Nightly    pantoprazole  40 mg Oral Daily    [Held by provider] sacubitril-valsartan  1 tablet Oral BID    doxazosin  4 mg Oral Daily    warfarin placeholder: dosing by pharmacy   Oral RX Placeholder     Continuous Infusions:   sodium chloride           CBC   Recent Labs     02/20/25  1007 02/21/25  0228 02/22/25  0846   WBC 12.8* 10.3 7.8   HGB 10.9* 9.2* 10.1*   HCT 35.6* 30.9* 33.4*    128* 163      BMP   Recent Labs     02/20/25 1007 02/21/25 0228    140   K 4.3 4.2   CL 95* 100   CO2 29 32   BUN 34* 38*   CREATININE 2.8* 2.8*     Hepatic:   Recent Labs     02/20/25 1007 02/21/25  0228   AST 22 17   ALT 7* <5*   BILITOT 0.8 0.3   ALKPHOS 63 61     Troponin: No results for input(s): \"TROPONINI\" in the last 72 hours.  BNP: No results for input(s): \"BNP\" in the last 72 hours.  Lipids: No results for input(s): \"CHOL\", \"HDL\" in the last 72 hours.    Invalid input(s): \"LDLCALCU\"  ABGs: No results found for: \"PHART\", \"PO2ART\", \"CBP3WQP\"  INR:   Recent Labs     02/21/25  0228 02/21/25  1032 02/22/25  0205   INR 7.5* 5.2* 1.9     Renal Labs  Albumin:  No results found for: \"LABALBU\"  Calcium:    Lab Results   Component Value Date/Time    CALCIUM 8.3 02/21/2025 02:28 AM     Phosphorus:    Lab Results   Component Value Date/Time    PHOS 3.0 02/11/2025 09:21 AM     U/A:    Lab Results   Component Value Date/Time    NITRU 
  Nephrology Progress Note  2/23/2025 10:12 AM  Subjective:     Interval History: Duarte Reyes is a 89 y.o. male in general doing well seen in the room with family in the room    Data:   Scheduled Meds:   sodium chloride flush  5-40 mL IntraVENous 2 times per day    cefTRIAXone (ROCEPHIN) IV  1,000 mg IntraVENous Q24H    carvedilol  25 mg Oral BID WC    empagliflozin  10 mg Oral Daily    docusate sodium  100 mg Oral Daily    finasteride  5 mg Oral Daily    furosemide  40 mg Oral Daily    latanoprost  1 drop Right Eye Nightly    melatonin  10 mg Oral Nightly    pantoprazole  40 mg Oral Daily    [Held by provider] sacubitril-valsartan  1 tablet Oral BID    doxazosin  4 mg Oral Daily    warfarin placeholder: dosing by pharmacy   Oral RX Placeholder     Continuous Infusions:   sodium chloride           CBC   Recent Labs     02/21/25 0228 02/22/25  0846 02/23/25  0146   WBC 10.3 7.8 7.7   HGB 9.2* 10.1* 9.8*   HCT 30.9* 33.4* 33.6*   * 163 179      BMP   Recent Labs     02/21/25 0228 02/22/25  0846 02/23/25  0146    142 143   K 4.2 3.8 4.1    101 102   CO2 32 33* 31   PHOS  --  3.2 3.0   BUN 38* 44* 42*   CREATININE 2.8* 2.4* 2.3*     Hepatic:   Recent Labs     02/21/25 0228   AST 17   ALT <5*   BILITOT 0.3   ALKPHOS 61     Troponin: No results for input(s): \"TROPONINI\" in the last 72 hours.  BNP: No results for input(s): \"BNP\" in the last 72 hours.  Lipids: No results for input(s): \"CHOL\", \"HDL\" in the last 72 hours.    Invalid input(s): \"LDLCALCU\"  ABGs: No results found for: \"PHART\", \"PO2ART\", \"MFQ9AOC\"  INR:   Recent Labs     02/21/25  1032 02/22/25  0205 02/23/25  0146   INR 5.2* 1.9 1.4     Renal Labs  Albumin:  No results found for: \"LABALBU\"  Calcium:    Lab Results   Component Value Date/Time    CALCIUM 8.3 02/23/2025 01:46 AM     Phosphorus:    Lab Results   Component Value Date/Time    PHOS 3.0 02/23/2025 01:46 AM     U/A:    Lab Results   Component Value Date/Time    NITRU NEGATIVE 
  Nephrology Progress Note  2/24/2025 10:27 AM  Subjective:     Interval History: Duarte Reyes is a 89 y.o. male who appears to be doing better overall waiting on placement family in the room    Data:   Scheduled Meds:   amoxicillin-clavulanate  1 tablet Oral 2 times per day    sodium chloride flush  5-40 mL IntraVENous 2 times per day    carvedilol  25 mg Oral BID WC    empagliflozin  10 mg Oral Daily    docusate sodium  100 mg Oral Daily    finasteride  5 mg Oral Daily    furosemide  40 mg Oral Daily    latanoprost  1 drop Right Eye Nightly    melatonin  10 mg Oral Nightly    pantoprazole  40 mg Oral Daily    [Held by provider] sacubitril-valsartan  1 tablet Oral BID    doxazosin  4 mg Oral Daily    warfarin placeholder: dosing by pharmacy   Oral RX Placeholder     Continuous Infusions:   sodium chloride           CBC   Recent Labs     02/22/25  0846 02/23/25  0146 02/24/25  0124   WBC 7.8 7.7 7.5   HGB 10.1* 9.8* 9.9*   HCT 33.4* 33.6* 33.4*    179 201      BMP   Recent Labs     02/22/25  0846 02/23/25  0146 02/24/25  0124    143 145   K 3.8 4.1 3.8    102 102   CO2 33* 31 34*   PHOS 3.2 3.0 2.4*   BUN 44* 42* 44*   CREATININE 2.4* 2.3* 2.1*     Hepatic:   No results for input(s): \"AST\", \"ALT\", \"BILITOT\", \"ALKPHOS\" in the last 72 hours.    Invalid input(s): \"ALB\"    Troponin: No results for input(s): \"TROPONINI\" in the last 72 hours.  BNP: No results for input(s): \"BNP\" in the last 72 hours.  Lipids: No results for input(s): \"CHOL\", \"HDL\" in the last 72 hours.    Invalid input(s): \"LDLCALCU\"  ABGs: No results found for: \"PHART\", \"PO2ART\", \"VZW8XGT\"  INR:   Recent Labs     02/22/25  0205 02/23/25  0146 02/24/25  0124   INR 1.9 1.4 1.2     Renal Labs  Albumin:  No results found for: \"LABALBU\"  Calcium:    Lab Results   Component Value Date/Time    CALCIUM 8.4 02/24/2025 01:24 AM     Phosphorus:    Lab Results   Component Value Date/Time    PHOS 2.4 02/24/2025 01:24 AM     U/A:    Lab Results 
  Nephrology Progress Note  2/25/2025 10:11 AM  Subjective:     Interval History: Duarte Reyes is a 89 y.o. male doing well in general no acute distress    Data:   Scheduled Meds:   amoxicillin-clavulanate  1 tablet Oral 2 times per day    sodium chloride flush  5-40 mL IntraVENous 2 times per day    carvedilol  25 mg Oral BID WC    empagliflozin  10 mg Oral Daily    docusate sodium  100 mg Oral Daily    finasteride  5 mg Oral Daily    furosemide  40 mg Oral Daily    latanoprost  1 drop Right Eye Nightly    melatonin  10 mg Oral Nightly    pantoprazole  40 mg Oral Daily    sacubitril-valsartan  1 tablet Oral BID    doxazosin  4 mg Oral Daily    warfarin placeholder: dosing by pharmacy   Oral RX Placeholder     Continuous Infusions:   sodium chloride           CBC   Recent Labs     02/23/25  0146 02/24/25  0124   WBC 7.7 7.5   HGB 9.8* 9.9*   HCT 33.6* 33.4*    201      BMP   Recent Labs     02/23/25 0146 02/24/25  0124    145   K 4.1 3.8    102   CO2 31 34*   PHOS 3.0 2.4*   BUN 42* 44*   CREATININE 2.3* 2.1*     Hepatic:   No results for input(s): \"AST\", \"ALT\", \"BILITOT\", \"ALKPHOS\" in the last 72 hours.    Invalid input(s): \"ALB\"    Troponin: No results for input(s): \"TROPONINI\" in the last 72 hours.  BNP: No results for input(s): \"BNP\" in the last 72 hours.  Lipids: No results for input(s): \"CHOL\", \"HDL\" in the last 72 hours.    Invalid input(s): \"LDLCALCU\"  ABGs: No results found for: \"PHART\", \"PO2ART\", \"LEC2TSQ\"  INR:   Recent Labs     02/23/25  0146 02/24/25  0124 02/25/25  0210   INR 1.4 1.2 1.1     Renal Labs  Albumin:  No results found for: \"LABALBU\"  Calcium:    Lab Results   Component Value Date/Time    CALCIUM 8.4 02/24/2025 01:24 AM     Phosphorus:    Lab Results   Component Value Date/Time    PHOS 2.4 02/24/2025 01:24 AM     U/A:    Lab Results   Component Value Date/Time    NITRU NEGATIVE 02/20/2025 12:00 PM    COLORU Yellow 02/20/2025 12:00 PM    PHUR 6.0 02/20/2025 12:00 PM    
  Physician Progress Note      PATIENT:               MIGUEL URBINA  CSN #:                  [id number]  :                       1935  ADMIT DATE:       2025 9:51 AM  DISCH DATE:  RESPONDING  PROVIDER #:        Rohan Escalona MD          QUERY TEXT:    Patient admitted with sepsis.  Noted documentation of Mild JESSICA CKD 3B in H&Osbaldo    with creatinine 2.8,2.4,2.3.  In order to support the diagnosis of JESSICA,   please include additional clinical indicators in your documentation.? Or   please document if the diagnosis of JESSICA has been ruled out after further   study.  The medical record reflects the following:  Risk Factors: CKD, HTN, Sepsis  Clinical Indicators:  H&P-Mild JESSICA CKD 3B. Cr slightly elevated from his baseline last Cr was 2.6.  Nephrology consult-acute renal failure on CKD 4.   progress note-mild renal insufficiency on top of CKD related to volume   depletion.    On : Creatinine - 2.8, BUN - 34, eGFR - 22  On : Creatinine - 2.8, BUN - 38, eGFR - 22  On :  Creatinine - 2.4, BUN - 44, eGFR - 25  On : Creatinine - 2.3, BUN - 42, eGFR - 27  On : Creatinine - 2.1, BUN - 44, eGFR - 29    Treatment: IV sodium chloride flush 0.9 %; Nephrology consult.    Thank you,  Staci SHARIF,CDS  Options provided:  -- Acute kidney injury evidenced by, Please document evidence as well as a   numerical baseline creatinine, if known.  -- Acute kidney injury ruled out after study  -- Other - I will add my own diagnosis  -- Disagree - Not applicable / Not valid  -- Disagree - Clinically unable to determine / Unknown  -- Refer to Clinical Documentation Reviewer    PROVIDER RESPONSE TEXT:    Acute kidney injury was ruled out after study.    Query created by: Staci Akers on 2025 12:14 AM      Electronically signed by:  Rohan Escalona MD 2025 7:36 AM          
4 Eyes Skin Assessment     NAME:  Duarte Reyes  YOB: 1935  MEDICAL RECORD NUMBER:  [id number]    The patient is being assessed for  Admission    I agree that at least one RN has performed a thorough Head to Toe Skin Assessment on the patient. ALL assessment sites listed below have been assessed.      Areas assessed by both nurses:    Head, Face, Ears, Shoulders, Back, Chest, Arms, Elbows, Hands, Sacrum. Buttock, Coccyx, Ischium, Legs. Feet and Heels, and Under Medical Devices         Does the Patient have a Wound? Yes wound(s) were present on assessment. LDA wound assessment was Initiated and completed by RN       James Prevention initiated by RN: Yes  Wound Care Orders initiated by RN: Yes    Pressure Injury (Stage 3,4, Unstageable, DTI, NWPT, and Complex wounds) if present, place Wound referral order by RN under : No    New Ostomies, if present place, Ostomy referral order under : Yes     Nurse 1 eSignature: Electronically signed by Amanda Kellogg RN on 2/20/25 at 6:25 PM EST    **SHARE this note so that the co-signing nurse can place an eSignature**    Nurse 2 eSignature: Electronically signed by Nneka Gonzalez LPN on 2/20/25 at 6:27 PM EST   
Comprehensive Nutrition Assessment    Type and Reason for Visit:  Initial, Positive nutrition screen, Wound (Poor appetite)    Nutrition Recommendations/Plan:   Begin standard high calorie, high protein oral nutrition supplement BID   Trial wound healing oral nutrition supplement BID   Encourage proper hydration/fluids intake  Continue to assist feed as able due to vision impairments  May consider downgrading texture to promote better po intake if needed      Malnutrition Assessment:  Malnutrition Status:  Insufficient data (02/21/25 1647)    Context:  Acute Illness       Nutrition Assessment:    Admitted with CAP. H/o CHF, CKDIV, A Fib, HTN, legally blind. Currently on a regular diet. Hx of reduced appetite PTA s/t acute illness. Continues with poor oral intake during stay. PO intake of 0%, needs feed assist. No significant wt loss PTA. Pt with pressure injury stage II, will offer wound healing supplements. Will closely monitor renal fx if appropriate for further fluid/protein intake. Follow as high nutrition risk.    Nutrition Related Findings:    GFR 22 Wound Type: Pressure Injury, Stage II (cluster)       Current Nutrition Intake & Therapies:    Average Meal Intake: 0%  Average Supplements Intake: None Ordered  ADULT DIET; Regular  ADULT ORAL NUTRITION SUPPLEMENT; Breakfast; Standard High Calorie/High Protein Oral Supplement  ADULT ORAL NUTRITION SUPPLEMENT; Lunch, Dinner; Wound Healing Oral Supplement    Anthropometric Measures:  Height: 195.6 cm (6' 5\")  Ideal Body Weight (IBW): 208 lbs (95 kg)    Admission Body Weight: 100.3 kg (221 lb 1.9 oz)  Current Body Weight: 100.3 kg (221 lb 1.9 oz), 106.3 % IBW. Weight Source: Bed scale (actual)  Current BMI (kg/m2): 26.2  Usual Body Weight: 101.6 kg (223 lb 14.4 oz) (4/25/24 til 10/2024 office visits, 215 lb 2023)     % Weight Change (Calculated): -1.2  Weight Adjustment For: No Adjustment                 BMI Categories: Overweight (BMI 25.0-29.9)    Estimated Daily 
Comprehensive Nutrition Assessment    Type and Reason for Visit:  Reassess    Nutrition Recommendations/Plan:   Continue regular diet   Continue standard oral nutrition supplement TID; monitor renal fx  Please document intakes of all meals, snacks, and supplements   Monitor weights, po intakes, labs, POC     Malnutrition Assessment:  Malnutrition Status:  At risk for malnutrition (02/25/25 0911)    Context:  Acute Illness       Nutrition Assessment:    Pt up in bed, is blind and requires full assist at meals. His po intakes have been varied during stay, avg 42%. Reports he eats smaller, \"lighter\" meals at home, 2x/day and that's what he's done for years now. He does note that he occasionally have episodes of acid reflux/N/V, states \"I eat eat lots of tums\", may happen once a week at home. Discussed asking doctor if he can take meds w/ foods to reduce upset stomach. NFPE performed, mild losses noted but likely r/t advanced age. Does likes oral supplements, drinks BID at home over the past year. Follow at high risk for now, w/ concern for adequate intakes.    Nutrition Related Findings:    +BUN 44, Cr 2.1, GFR 29 Wound Type: Pressure Injury, Stage II (cluster)       Current Nutrition Intake & Therapies:    Average Meal Intake: 26-50%  Average Supplements Intake: %  ADULT ORAL NUTRITION SUPPLEMENT; Breakfast; Standard High Calorie/High Protein Oral Supplement  ADULT ORAL NUTRITION SUPPLEMENT; Lunch, Dinner; Wound Healing Oral Supplement  ADULT ORAL NUTRITION SUPPLEMENT; Lunch, Dinner; Standard High Calorie/High Protein Oral Supplement  ADULT DIET; Regular; vanilla    Anthropometric Measures:  Height: 195.6 cm (6' 5\")  Ideal Body Weight (IBW): 208 lbs (95 kg)    Admission Body Weight: 100.3 kg (221 lb 1.9 oz)  Current Body Weight: 100.3 kg (221 lb 1.9 oz), 106.3 % IBW. Weight Source: Bed scale (actual)  Current BMI (kg/m2): 26.2  Usual Body Weight: 101.6 kg (223 lb 14.4 oz) (4/25/24 til 10/2024 office visits, 215 
Dr. Arroyo ok with getting VBG rather than ABG. RN to draw VBG.   
Occupational Therapy  [facility] ACUTE CARE OCCUPATIONAL THERAPY EVALUATION    Duarte Reyes, 1935, 1124/1124-A, 2/22/2025    Discharge Recommendation: Facility for moderate post-acute rehabilitation, anticipate 1-2 hours per day and 5 days per week.      History:  [place]:  The primary encounter diagnosis was Sepsis without acute organ dysfunction, due to unspecified organism (HCC). Diagnoses of Pneumonia of right upper lobe due to infectious organism, Hx of chronic kidney disease, and Generalized weakness were also pertinent to this visit.  Past Medical History:   Diagnosis Date    Chronic kidney disease     Heart abnormality     Hypertension     UTI (urinary tract infection)        Subjective:  Patient states: \"Standing is out of the question today I think\"  Pain:  denies  Communication with other providers: co-eval w/ PT, handoff to RN  Restrictions: General Precautions, Fall Risk, contact isolation    Home Setup/Prior level of function:  Social/Functional History  Lives With: Daughter  Type of Home: House  Home Layout: One level  Home Access: Ramped entrance  Bathroom Toilet: Standard  Bathroom Equipment: Shower chair  Home Equipment: Wheelchair - Manual, Oxygen (2 L O2)  Prior Level of Assist for ADLs: Needs assistance  Prior Level of Assist for Homemaking: Needs assistance  Prior Level of Assist for Ambulation:  (patient is typically mod I with a manual w/c)  Prior Level of Assist for Transfers: Independent  Active : No     Examination:  Observation: Supine in bed upon arrival, agreeable to therapy eval.  Vision: legally blind  Hearing: WFL  Vitals: Stable vitals throughout session on 1L O2      Body Systems and functions:  ROM: WFL   Strength: B UE 4/5 across all major joints   Sensation: WFL  Tone: Normal  Coordination: WFL  Perception: WNL      Cognitive and Psychosocial Functioning:  Overall cognitive status: alert, oriented to person and place  Affect: Normal       Functional 
PHARMACY ANTICOAGULATION MONITORING SERVICE    Duarte Reyes is a 89 y.o. male on warfarin therapy for A-fib. Pharmacy consulted by Dr. Amin for monitoring and adjustment of treatment.    Indication for anticoagulation: A-Fib  INR goal: 2-3  Warfarin dose prior to admission: 4 mg daily. Last dose was on 2/19    Pertinent Laboratory Values   Recent Labs     02/20/25  1007 02/20/25  1840   INR  --  6.8*   HGB 10.9*  --    HCT 35.6*  --      --        Assessment/Plan:  Drug Interactions:   Possible Interacting home medications:        Tylenol, Azithromycin           INR 6.8  HGB : 10.9  Will hold warfarin for now  One dose of Vitamin k ordered   Pharmacy will continue to monitor and adjust warfarin therapy as indicated    Thank you for the consult.  Lakeshia Griffin RPH  2/20/2025 9:09 PM   
PHARMACY ANTICOAGULATION MONITORING SERVICE    Duarte Reyes is a 89 y.o. male on warfarin therapy for A-fib. Pharmacy consulted by Dr. Amin for monitoring and adjustment of treatment.    Indication for anticoagulation: A-Fib  INR goal: 2-3  Warfarin dose prior to admission: 4 mg daily. Last dose was on 2/19    Pertinent Laboratory Values   Recent Labs     02/20/25  1007 02/20/25  1840 02/21/25  0228   INR  --    < > 7.5*   HGB 10.9*  --  9.2*   HCT 35.6*  --  30.9*     --  128*    < > = values in this interval not displayed.       Assessment/Plan:  Drug Interactions:   Home meds:  No significant interactions noted  Pt continues on azithromycin which may increase warfarin effects  Vitamin K 10mg subq x1 dose given yesterday 2/20  INR supra-therapeutic on admission @6.8 yesterday, trending up to 7.5 today.  HGB with a slight downward trend.  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  Continue to hold warfarin and follow INR trends tomorrow.  Pharmacy will continue to monitor and adjust warfarin therapy as indicated    Thank you for the consult.  Rufus Smith [facility]  2/21/2025 9:33 AM   
PHARMACY ANTICOAGULATION MONITORING SERVICE    Duarte Reyes is a 89 y.o. male on warfarin therapy for A-fib. Pharmacy consulted by Dr. Amin for monitoring and adjustment of treatment.    Indication for anticoagulation: A-Fib  INR goal: 2-3  Warfarin dose prior to admission: 4 mg daily. Last dose was on 2/19    Pertinent Laboratory Values   Recent Labs     02/20/25  1007 02/20/25  1840 02/21/25  0228 02/21/25  1032 02/22/25  0205 02/22/25  0846   INR  --    < > 7.5*   < > 1.9  --    HGB 10.9*  --  9.2*  --   --  10.1*   HCT 35.6*  --  30.9*  --   --  33.4*     --  128*  --   --  163    < > = values in this interval not displayed.     Recent Warfarin doses given this admission...  Date INR Result Warfarin Dose Given   2/20 6.8 Vit K 10 mg given    2/21 7.5 Held    2/22 1.9 Warfarin 2 mg (ordered)           Assessment/Plan:  Drug Interactions:   Home meds:  No significant interactions noted  Pt continues on azithromycin which may increase warfarin effects  Vitamin K 10mg subq x1 dose given 2/20  INR  now subtherapeutic @1.9 mg/dL   HGB with a slight downward trend received blood transfusion.  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  Will restart warfarin at reduced dose of 2 mg tonight   Pharmacy will continue to monitor and adjust warfarin therapy as indicated    Thank you for the consult.  Dandre Jefferson [facility]  2/22/2025 4:17 PM   .ptdw  
PHARMACY ANTICOAGULATION MONITORING SERVICE    Duarte Reyes is a 89 y.o. male on warfarin therapy for A-fib. Pharmacy consulted by Dr. Amin for monitoring and adjustment of treatment.    Indication for anticoagulation: A-Fib  INR goal: 2-3  Warfarin dose prior to admission: 4 mg daily. Last dose was on 2/19    Pertinent Laboratory Values   Recent Labs     02/21/25  0228 02/21/25  1032 02/22/25  0846 02/23/25  0146   INR 7.5*   < >  --  1.4   HGB 9.2*  --  10.1* 9.8*   HCT 30.9*  --  33.4* 33.6*   *  --  163 179    < > = values in this interval not displayed.     Recent Warfarin doses given this admission...  Date INR Result Warfarin Dose Given   2/20 6.8 Vit K 10 mg given    2/21 7.5 Held    2/22 1.9  2 mg     2/23 1.4             2 mg (ordered)      Assessment/Plan:  Drug Interactions:   Home meds:  No significant interactions noted  Pt continues on azithromycin which may increase warfarin effects  INR supratherapeutic on admission  @ 6.8 and received one dose of vitamin K on 2/20 remains subtherapeutic @ 1.4 today trending down likely due to vitamin K administration   HGB with a slight downward trend received blood transfusion.  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  Will Give another dose of warfarin 2 mg tonight  Pharmacy will continue to monitor and adjust warfarin therapy as indicated    Thank you for the consult.  Dandre Jefferson RPH  2/23/2025 4:15 PM   
PHARMACY ANTICOAGULATION MONITORING SERVICE    Duarte Reyes is a 89 y.o. male on warfarin therapy for A-fib. Pharmacy consulted by Dr. Amin for monitoring and adjustment of treatment.    Indication for anticoagulation: A-Fib  INR goal: 2-3  Warfarin dose prior to admission: 4 mg daily. Last dose was on 2/19    Pertinent Laboratory Values   Recent Labs     02/22/25  0846 02/23/25  0146 02/24/25  0124   INR  --  1.4 1.2   HGB 10.1* 9.8* 9.9*   HCT 33.4* 33.6* 33.4*    179 201     Recent Warfarin doses given this admission...  Date INR Result Warfarin Dose Given   2/20 6.8 Vit K 10 mg given    2/21 7.5 Held    2/22 1.9  2 mg     2/23 1.4                   2 mg   2/24 1.2 4 mg (ordered)      Assessment/Plan:  Drug Interactions:   Home meds:  No significant interactions noted  Pt previously on azithromycin, now continues on Augmentin which may increase warfarin effects.  INR supratherapeutic on admission @6.8 and received one dose of vitamin K on 2/20, remains subtherapeutic and continues to trend downward @ 1.2 today.  Lower warfarin 2mg doses given the last 2 days.  HGB with a slight downward trend, received blood transfusion.  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  Give normal warfarin 4mg dose tonight and follow INR trends tomorrow.  Pharmacy will continue to monitor and adjust warfarin therapy as indicated    Thank you for the consult.  Rufus Smith [facility]  2/24/2025 10:55 AM   
Patient seen and examined full note to follow possible aspiration pneumonia based on x-ray findings of her upper lobe infiltrate positive influenza in addition worried for possible UTI with acute and chronic kidney failure be admitted for treatment with above setting for possible upper respiratory infection and mild renal insufficiency on top of CKD related to volume depletion.  Will monitor closely full note to follow  
Physical Therapy       Tx att, pt dcing, transport present.    Electronically signed by:    Maximiliano Hernández PTA  2/25/2025, 1:43 PM      
Physical Therapy    Physical Therapy Treatment Note  Name: Duarte Reyes MRN: [id number] :   1935   Date:  2025   Admission Date: 2025 Room:  [address] [place] [address]   Restrictions/Precautions:  Restrictions/Precautions  Restrictions/Precautions: Fall Risk, General Precautions         Communication with other providers:  per chart review pt appropriate for tx  Subjective:  Patient states:  pt agreeable to tx. Pt states \" I don't wear oxygen all the time\". Pt would take O2 off but then become SOB and re-applied it. Pt advised that this therapist thinks he should probably  keep it on all the time right now.  Pain:   Location, Type, Intensity (0/10 to 10/10):  no c/o pain during tx session  Objective:    Observation:  alert and oriented on 2 liters O2. Pt is blind and needing increased time and cues to guide wc  Objective Measures:  pt not on telle  Treatment, including education/measures:  Sup <=> sit with cga using bed rail  Sit<=>stand min assist from bed x 2 reps with bilateral bed rails for support and bed elevated at pt's request.  SPT bed<=>wc min assist and cues for safety  Pt was able to propel wc with UE/LE 80' but needing to stop and rest with cues for PLB x 4. Pt had removed O2 and was SOB with activity. Therapist did have O2 tank but pt declined wanting to wait until he returned to room.  Safety  Patient left safely in the bed, with call light/phone in reach with alarm applied. Gait belt was used for transfers and gait.   Assessment / Impression:      Patient's tolerance of treatment:  good   Adverse Reaction: na  Significant change in status and impact:  na  Barriers to improvement:  SOB with activity, strength, and safety  Plan for Next Session:    Cont. POC.                 Time in:  1355  Time out:  1450  Timed treatment minutes:  55  Total treatment time:  55    Previously filed items:  Social/Functional History  Lives With: Daughter  Type of Home: House  Home Layout: One level  Home Access: 
Physical Therapy  Facility/Department: [address]  Physical Therapy Initial Assessment    Name: Duarte Reyes  : 1935  MRN: [id number]  Date of Service: 2025    Discharge Recommendations:    Facility for moderate post-acute rehabilitation, anticipate 1-2 hours per day and 5 days per week.           Patient Diagnosis(es): The primary encounter diagnosis was Sepsis without acute organ dysfunction, due to unspecified organism (HCC). Diagnoses of Pneumonia of right upper lobe due to infectious organism, Hx of chronic kidney disease, and Generalized weakness were also pertinent to this visit.  Past Medical History:  has a past medical history of Chronic kidney disease, Heart abnormality, Hypertension, and UTI (urinary tract infection).  Past Surgical History:  has a past surgical history that includes Cholecystectomy and fracture surgery.    Assessment  Body Structures, Functions, Activity Limitations Requiring Skilled Therapeutic Intervention: Decreased functional mobility ;Decreased ADL status;Decreased endurance;Increased pain;Decreased balance;Decreased high-level IADLs;Decreased strength;Decreased safe awareness;Decreased cognition  Therapy Prognosis: Good  Decision Making: High Complexity  Clinical Presentation: unpredictable characteristics  Requires PT Follow-Up: Yes  Activity Tolerance  Activity Tolerance: Patient tolerated evaluation without incident    Plan  Physical Therapy Plan  General Plan: 3-5 times per week  Current Treatment Recommendations: Strengthening, ROM, Balance training, Functional mobility training, Transfer training, ADL/Self-care training, IADL training, Cognitive/Perceptual training, Endurance training, Wheelchair mobility training, Gait training, Stair training, Neuromuscular re-education, Patient/Caregiver education & training, Equipment evaluation, education, & procurement, Pain management, Positioning, Safety education & training, Home exercise program, Therapeutic 
Physical/Occupational Therapy  Received orders for PT/OT evaluation and treat. Upon chart review, pt's INR 7.5. will hold until in an appropriate range for mobilization    
Resp viral panel resulted and negative.   
Workstation ID: [id number]  Dictated and Electronically Signed By: Nathan Sargent MD 2/20/2025 11:33          CBC:   Recent Labs     02/22/25  0846 02/23/25  0146 02/24/25  0124   WBC 7.8 7.7 7.5   HGB 10.1* 9.8* 9.9*    179 201     BMP:    Recent Labs     02/22/25  0846 02/23/25  0146 02/24/25  0124    143 145   K 3.8 4.1 3.8    102 102   CO2 33* 31 34*   BUN 44* 42* 44*   CREATININE 2.4* 2.3* 2.1*   GLUCOSE 98 111* 139*     Hepatic: No results for input(s): \"AST\", \"ALT\", \"BILITOT\", \"ALKPHOS\" in the last 72 hours.    Invalid input(s): \"ALB\"  Lipids:   Lab Results   Component Value Date/Time    CHOL 156 01/02/2025 01:05 PM    HDL 31 01/02/2025 01:05 PM    TRIG 157 01/02/2025 01:05 PM     Hemoglobin A1C: No results found for: \"LABA1C\"  TSH:   Lab Results   Component Value Date/Time    TSH 2.19 01/02/2025 01:05 PM     Troponin:   Lab Results   Component Value Date/Time    TROPONINT 0.015 02/25/2023 05:39 AM    TROPONINT 0.017 02/24/2023 05:48 PM    TROPONINT 0.017 02/24/2023 12:16 PM     Lactic Acid: No results for input(s): \"LACTA\" in the last 72 hours.  BNP: No results for input(s): \"PROBNP\" in the last 72 hours.  UA:  Lab Results   Component Value Date/Time    NITRU NEGATIVE 02/20/2025 12:00 PM    COLORU Yellow 02/20/2025 12:00 PM    PHUR 6.0 02/20/2025 12:00 PM    WBCUA <1 02/20/2025 12:00 PM    RBCUA 1 02/20/2025 12:00 PM    RBCUA <1 10/27/2023 10:00 AM    MUCUS RARE 05/08/2023 08:30 AM    TRICHOMONAS NONE SEEN 10/27/2023 10:00 AM    YEAST RARE 05/08/2023 08:30 AM    BACTERIA NEGATIVE 10/27/2023 10:00 AM    CLARITYU CLEAR 07/15/2024 02:00 PM    LEUKOCYTESUR NEGATIVE 02/20/2025 12:00 PM    UROBILINOGEN 0.2 02/20/2025 12:00 PM    BILIRUBINUR NEGATIVE 02/20/2025 12:00 PM    BLOODU NEGATIVE 07/15/2024 02:00 PM    GLUCOSEU 250 02/20/2025 12:00 PM    KETUA NEGATIVE 02/20/2025 12:00 PM     Urine Cultures: No results found for: \"LABURIN\"  Blood Cultures: No results found for: \"BC\"  No results 
and or kV according to the patient's size) were used to limited patient radiation dose. Findings CT abdomen: The scans through the lung bases show some minimal atelectasis in both costophrenic sulci. The liver and spleen show no focal abnormality on noncontrast imaging with incidental note made of a small 13 mm caudate lobe cyst in the liver. The pancreas and adrenal glands are unremarkable. No retroperitoneal mass or free fluid or focal inflammatory change is seen in the abdomen. Cholecystectomy clips  are noted. The kidneys show no evidence of hydronephrosis or nephrolithiasis. CT PELVIS The scans through the pelvis show the urinary bladder. No mass or significant free fluid or focal inflammatory changes are seen. Diverticulosis of the sigmoid  and descending colon is noted without evidence of acute diverticulitis. There is a small umbilical hernia containing fat. The appendix is thought to be visualized and unremarkable. IMPRESSION: NO ACUTE PROCESS IS SEEN IN THE ABDOMEN OR PELVIS AT THIS TIME ON NONCONTRAST IMAGING. INCIDENTAL FINDINGS ABOVE Workstation ID: [id number]  Dictated and Electronically Signed By: Nathan Sargent MD 2/20/2025 12:19        XR CHEST PORTABLE    Result Date: 2/20/2025   XR CHEST PORTABLE [id number] 2/20/2025 11:52 Reason for exam:weakness Comparison:  Last May image only Findings: The heart and mediastinum are within normal limits for technique. Trachea is midline. There is a vague infiltrate suggested in the right upper lobe which was  not visible previously. Lungs are otherwise clear. IMPRESSION: CANNOT EXCLUDE EARLY PNEUMONIA RIGHT UPPER LOBE. CORRELATE CLINICALLY. Workstation ID: [id number]  Dictated and Electronically Signed By: Nathan Sargent MD 2/20/2025 11:33          Recent Results (from the past 24 hour(s))   Influenza A and B    Collection Time: 02/20/25  2:24 PM    Specimen: Nasopharyngeal   Result Value Ref Range    Influenza A by PCR Not Detected Not Detected

## 2025-03-05 LAB
MICROORGANISM SPEC CULT: NORMAL
MICROORGANISM SPEC CULT: NORMAL
SERVICE CMNT-IMP: NORMAL
SERVICE CMNT-IMP: NORMAL
SPECIMEN DESCRIPTION: NORMAL
SPECIMEN DESCRIPTION: NORMAL

## 2025-03-11 LAB
ARTERIAL PATENCY WRIST A: NORMAL
BDY SITE: NORMAL
BODY TEMPERATURE: NORMAL
BREATHS.MECHANICAL ON VENT: NORMAL
COHGB MFR BLD: NORMAL % (ref 0–5)
FIO2 ON VENT: NORMAL %
GAS FLOW.O2 O2 DELIVERY SYS: NORMAL L/MIN
GAS FLOW.O2 SETTING OXYMISER: NORMAL L/MIN
HCO3 ARTERIAL: NORMAL MMOL/L (ref 22–27)
HGB BLDMV-MCNC: NORMAL G/DL (ref 12–16)
METHEMOGLOBIN: NORMAL % (ref 0–1.5)
NEGATIVE BASE EXCESS, ART: NORMAL MMOL/L (ref 0–2)
NOTIFICATION TIME: NORMAL
NOTIFICATION: NORMAL
O2 SAT, ARTERIAL: NORMAL % (ref 94–100)
OXYHGB MFR BLD: NORMAL % (ref 95–98)
PCO2 ARTERIAL: NORMAL MMHG (ref 32–45)
PCO2, ART, TEMP ADJ: NORMAL (ref 32–45)
PEEP RESPIRATORY: NORMAL CM[H2O]
PH ARTERIAL: NORMAL (ref 7.35–7.45)
PH, ART, TEMP ADJ: NORMAL (ref 7.35–7.45)
PO2 ARTERIAL: NORMAL MMHG (ref 75–95)
PO2, ART, TEMP ADJ: NORMAL MMHG (ref 75–95)
POSITIVE BASE EXCESS, ART: NORMAL MMOL/L (ref 0–2)
PT. POSITION: NORMAL
RESPIRATORY RATE: NORMAL
TEXT FOR RESPIRATORY: NORMAL
TOTAL RATE: NORMAL
VENTILATION MODE VENT: NORMAL
VT: NORMAL

## 2025-04-03 ENCOUNTER — APPOINTMENT (OUTPATIENT)
Dept: CT IMAGING | Age: 89
DRG: 682 | End: 2025-04-03
Payer: MEDICARE

## 2025-04-03 ENCOUNTER — APPOINTMENT (OUTPATIENT)
Dept: GENERAL RADIOLOGY | Age: 89
DRG: 682 | End: 2025-04-03
Payer: MEDICARE

## 2025-04-03 ENCOUNTER — HOSPITAL ENCOUNTER (INPATIENT)
Age: 89
LOS: 4 days | Discharge: HOME HEALTH | DRG: 682 | End: 2025-04-07
Attending: EMERGENCY MEDICINE | Admitting: STUDENT IN AN ORGANIZED HEALTH CARE EDUCATION/TRAINING PROGRAM
Payer: MEDICARE

## 2025-04-03 DIAGNOSIS — R41.82 ALTERED MENTAL STATUS, UNSPECIFIED ALTERED MENTAL STATUS TYPE: ICD-10-CM

## 2025-04-03 DIAGNOSIS — N17.9 ACUTE KIDNEY INJURY SUPERIMPOSED ON CKD: ICD-10-CM

## 2025-04-03 DIAGNOSIS — N18.9 ACUTE KIDNEY INJURY SUPERIMPOSED ON CKD: ICD-10-CM

## 2025-04-03 DIAGNOSIS — E86.0 DEHYDRATION: Primary | ICD-10-CM

## 2025-04-03 DIAGNOSIS — I48.20 CHRONIC ATRIAL FIBRILLATION (HCC): ICD-10-CM

## 2025-04-03 LAB
ALBUMIN SERPL-MCNC: 3.7 G/DL (ref 3.4–5)
ALBUMIN/GLOB SERPL: 1.1 {RATIO} (ref 1.1–2.2)
ALP SERPL-CCNC: 75 U/L (ref 40–129)
ALT SERPL-CCNC: 8 U/L (ref 10–40)
ANION GAP SERPL CALCULATED.3IONS-SCNC: 10 MMOL/L (ref 9–17)
AST SERPL-CCNC: 19 U/L (ref 15–37)
BASOPHILS # BLD: 0.06 K/UL
BASOPHILS NFR BLD: 1 % (ref 0–1)
BILIRUB SERPL-MCNC: 0.6 MG/DL (ref 0–1)
BILIRUB UR QL STRIP: NEGATIVE
BNP SERPL-MCNC: 4218 PG/ML (ref 0–450)
BUN SERPL-MCNC: 54 MG/DL (ref 7–20)
CALCIUM SERPL-MCNC: 9.6 MG/DL (ref 8.3–10.6)
CHLORIDE SERPL-SCNC: 98 MMOL/L (ref 99–110)
CK SERPL-CCNC: 66 U/L (ref 26–192)
CLARITY UR: ABNORMAL
CO2 SERPL-SCNC: 37 MMOL/L (ref 21–32)
COLOR UR: YELLOW
CREAT SERPL-MCNC: 4 MG/DL (ref 0.8–1.3)
EOSINOPHIL # BLD: 0.35 K/UL
EOSINOPHILS RELATIVE PERCENT: 4 % (ref 0–3)
ERYTHROCYTE [DISTWIDTH] IN BLOOD BY AUTOMATED COUNT: 14.6 % (ref 11.7–14.9)
GFR, ESTIMATED: 14 ML/MIN/1.73M2
GLUCOSE BLD-MCNC: 127 MG/DL (ref 74–99)
GLUCOSE SERPL-MCNC: 126 MG/DL (ref 74–99)
GLUCOSE UR STRIP-MCNC: 100 MG/DL
HCT VFR BLD AUTO: 39.7 % (ref 42–52)
HGB BLD-MCNC: 12 G/DL (ref 13.5–18)
HGB UR QL STRIP.AUTO: ABNORMAL
IMM GRANULOCYTES # BLD AUTO: 0.03 K/UL
IMM GRANULOCYTES NFR BLD: 0 %
INR PPP: 2.2
KETONES UR STRIP-MCNC: NEGATIVE MG/DL
LACTATE BLDV-SCNC: 1.1 MMOL/L (ref 0.4–2)
LEUKOCYTE ESTERASE UR QL STRIP: NEGATIVE
LIPASE SERPL-CCNC: 46 U/L (ref 13–60)
LYMPHOCYTES NFR BLD: 2.39 K/UL
LYMPHOCYTES RELATIVE PERCENT: 29 % (ref 24–44)
MAGNESIUM SERPL-MCNC: 2.4 MG/DL (ref 1.8–2.4)
MCH RBC QN AUTO: 28.6 PG (ref 27–31)
MCHC RBC AUTO-ENTMCNC: 30.2 G/DL (ref 32–36)
MCV RBC AUTO: 94.7 FL (ref 78–100)
MONOCYTES NFR BLD: 1.01 K/UL
MONOCYTES NFR BLD: 12 % (ref 0–4)
NEUTROPHILS NFR BLD: 54 % (ref 36–66)
NEUTS SEG NFR BLD: 4.52 K/UL
NITRITE UR QL STRIP: NEGATIVE
PH UR STRIP: 8 [PH] (ref 5–8)
PLATELET # BLD AUTO: 147 K/UL (ref 140–440)
PMV BLD AUTO: 10.4 FL (ref 7.5–11.1)
POTASSIUM SERPL-SCNC: 4.5 MMOL/L (ref 3.5–5.1)
PROCALCITONIN SERPL-MCNC: 0.08 NG/ML
PROT SERPL-MCNC: 6.9 G/DL (ref 6.4–8.2)
PROT UR STRIP-MCNC: 30 MG/DL
PROTHROMBIN TIME: 24.8 SEC (ref 11.7–14.5)
RBC # BLD AUTO: 4.19 M/UL (ref 4.6–6.2)
SODIUM SERPL-SCNC: 144 MMOL/L (ref 136–145)
SP GR UR STRIP: 1.01 (ref 1–1.03)
TROPONIN I SERPL HS-MCNC: 38 NG/L (ref 0–22)
TROPONIN I SERPL HS-MCNC: 41 NG/L (ref 0–22)
UROBILINOGEN UR STRIP-ACNC: 0.2 EU/DL (ref 0–1)
WBC OTHER # BLD: 8.4 K/UL (ref 4–10.5)

## 2025-04-03 PROCEDURE — 83880 ASSAY OF NATRIURETIC PEPTIDE: CPT

## 2025-04-03 PROCEDURE — 71045 X-RAY EXAM CHEST 1 VIEW: CPT

## 2025-04-03 PROCEDURE — 84145 PROCALCITONIN (PCT): CPT

## 2025-04-03 PROCEDURE — 70490 CT SOFT TISSUE NECK W/O DYE: CPT

## 2025-04-03 PROCEDURE — 74176 CT ABD & PELVIS W/O CONTRAST: CPT

## 2025-04-03 PROCEDURE — 81001 URINALYSIS AUTO W/SCOPE: CPT

## 2025-04-03 PROCEDURE — 83690 ASSAY OF LIPASE: CPT

## 2025-04-03 PROCEDURE — 85610 PROTHROMBIN TIME: CPT

## 2025-04-03 PROCEDURE — 82550 ASSAY OF CK (CPK): CPT

## 2025-04-03 PROCEDURE — 83735 ASSAY OF MAGNESIUM: CPT

## 2025-04-03 PROCEDURE — 99285 EMERGENCY DEPT VISIT HI MDM: CPT

## 2025-04-03 PROCEDURE — 70450 CT HEAD/BRAIN W/O DYE: CPT

## 2025-04-03 PROCEDURE — 2580000003 HC RX 258: Performed by: NURSE PRACTITIONER

## 2025-04-03 PROCEDURE — 36415 COLL VENOUS BLD VENIPUNCTURE: CPT

## 2025-04-03 PROCEDURE — 83605 ASSAY OF LACTIC ACID: CPT

## 2025-04-03 PROCEDURE — 1200000000 HC SEMI PRIVATE

## 2025-04-03 PROCEDURE — 93005 ELECTROCARDIOGRAM TRACING: CPT | Performed by: NURSE PRACTITIONER

## 2025-04-03 PROCEDURE — 82962 GLUCOSE BLOOD TEST: CPT

## 2025-04-03 PROCEDURE — 85025 COMPLETE CBC W/AUTO DIFF WBC: CPT

## 2025-04-03 PROCEDURE — 86140 C-REACTIVE PROTEIN: CPT

## 2025-04-03 PROCEDURE — 84484 ASSAY OF TROPONIN QUANT: CPT

## 2025-04-03 PROCEDURE — 80053 COMPREHEN METABOLIC PANEL: CPT

## 2025-04-03 PROCEDURE — 87040 BLOOD CULTURE FOR BACTERIA: CPT

## 2025-04-03 RX ORDER — ONDANSETRON 2 MG/ML
4 INJECTION INTRAMUSCULAR; INTRAVENOUS EVERY 6 HOURS PRN
Status: DISCONTINUED | OUTPATIENT
Start: 2025-04-03 | End: 2025-04-07 | Stop reason: HOSPADM

## 2025-04-03 RX ORDER — 0.9 % SODIUM CHLORIDE 0.9 %
30 INTRAVENOUS SOLUTION INTRAVENOUS ONCE
Status: COMPLETED | OUTPATIENT
Start: 2025-04-03 | End: 2025-04-03

## 2025-04-03 RX ORDER — FUROSEMIDE 10 MG/ML
40 INJECTION INTRAMUSCULAR; INTRAVENOUS ONCE
Status: DISCONTINUED | OUTPATIENT
Start: 2025-04-03 | End: 2025-04-03

## 2025-04-03 RX ADMIN — SODIUM CHLORIDE 2673 ML: 0.9 INJECTION, SOLUTION INTRAVENOUS at 18:30

## 2025-04-03 ASSESSMENT — PAIN SCALES - GENERAL: PAINLEVEL_OUTOF10: 0

## 2025-04-03 NOTE — ED TRIAGE NOTES
Patient to ED for altered mental status and aphasia that started 5 days ago. Patient was released from the nursing home 5 days ago and when he came home he began speaking in sentences that did not make sense. Patient does wear 2L NC at home. Patient is on blood thinners.

## 2025-04-04 ENCOUNTER — APPOINTMENT (OUTPATIENT)
Dept: CT IMAGING | Age: 89
DRG: 682 | End: 2025-04-04
Payer: MEDICARE

## 2025-04-04 LAB
ALBUMIN SERPL-MCNC: 3.1 G/DL (ref 3.4–5)
ALBUMIN/GLOB SERPL: 0.9 {RATIO} (ref 1.1–2.2)
ALP SERPL-CCNC: 67 U/L (ref 40–129)
ALT SERPL-CCNC: 5 U/L (ref 10–40)
ANION GAP SERPL CALCULATED.3IONS-SCNC: 9 MMOL/L (ref 9–17)
ARTERIAL PATENCY WRIST A: ABNORMAL
AST SERPL-CCNC: 22 U/L (ref 15–37)
BASOPHILS # BLD: 0.06 K/UL
BASOPHILS NFR BLD: 1 % (ref 0–1)
BILIRUB SERPL-MCNC: 0.5 MG/DL (ref 0–1)
BODY TEMPERATURE: 37
BUN SERPL-MCNC: 51 MG/DL (ref 7–20)
CALCIUM SERPL-MCNC: 9.4 MG/DL (ref 8.3–10.6)
CHARACTER UR: ABNORMAL
CHLORIDE SERPL-SCNC: 104 MMOL/L (ref 99–110)
CO2 SERPL-SCNC: 32 MMOL/L (ref 21–32)
COHGB MFR BLD: 0.6 % (ref 0.5–1.5)
CREAT SERPL-MCNC: 3.8 MG/DL (ref 0.8–1.3)
CRP SERPL HS-MCNC: 10.4 MG/L (ref 0–5)
EOSINOPHIL # BLD: 0.4 K/UL
EOSINOPHILS RELATIVE PERCENT: 5 % (ref 0–3)
ERYTHROCYTE [DISTWIDTH] IN BLOOD BY AUTOMATED COUNT: 14.8 % (ref 11.7–14.9)
GFR, ESTIMATED: 15 ML/MIN/1.73M2
GLUCOSE BLD-MCNC: 111 MG/DL (ref 74–99)
GLUCOSE BLD-MCNC: 121 MG/DL (ref 74–99)
GLUCOSE SERPL-MCNC: 118 MG/DL (ref 74–99)
HCO3 VENOUS: 32.7 MMOL/L (ref 22–29)
HCT VFR BLD AUTO: 38.7 % (ref 42–52)
HGB BLD-MCNC: 10.9 G/DL (ref 13.5–18)
IMM GRANULOCYTES # BLD AUTO: 0.02 K/UL
IMM GRANULOCYTES NFR BLD: 0 %
LYMPHOCYTES NFR BLD: 2.44 K/UL
LYMPHOCYTES RELATIVE PERCENT: 28 % (ref 24–44)
MCH RBC QN AUTO: 28.2 PG (ref 27–31)
MCHC RBC AUTO-ENTMCNC: 28.2 G/DL (ref 32–36)
MCV RBC AUTO: 100.3 FL (ref 78–100)
METHEMOGLOBIN: 0.5 % (ref 0.5–1.5)
MONOCYTES NFR BLD: 1.03 K/UL
MONOCYTES NFR BLD: 12 % (ref 0–4)
NEUTROPHILS NFR BLD: 55 % (ref 36–66)
NEUTS SEG NFR BLD: 4.83 K/UL
OXYHGB MFR BLD: 38.4 %
PCO2 VENOUS: 58.1 MM HG (ref 38–54)
PH VENOUS: 7.37 (ref 7.32–7.43)
PLATELET # BLD AUTO: 134 K/UL (ref 140–440)
PMV BLD AUTO: 10.5 FL (ref 7.5–11.1)
PO2 VENOUS: 23 MM HG (ref 23–48)
POSITIVE BASE EXCESS, VEN: 5.9 MMOL/L (ref 0–3)
POTASSIUM SERPL-SCNC: 5.2 MMOL/L (ref 3.5–5.1)
PROT SERPL-MCNC: 6.7 G/DL (ref 6.4–8.2)
RBC # BLD AUTO: 3.86 M/UL (ref 4.6–6.2)
RBC #/AREA URNS HPF: 1479 /HPF (ref 0–2)
SODIUM SERPL-SCNC: 145 MMOL/L (ref 136–145)
WBC #/AREA URNS HPF: 7 /HPF (ref 0–5)
WBC OTHER # BLD: 8.8 K/UL (ref 4–10.5)

## 2025-04-04 PROCEDURE — 82962 GLUCOSE BLOOD TEST: CPT

## 2025-04-04 PROCEDURE — 2700000000 HC OXYGEN THERAPY PER DAY

## 2025-04-04 PROCEDURE — 92610 EVALUATE SWALLOWING FUNCTION: CPT

## 2025-04-04 PROCEDURE — 1200000000 HC SEMI PRIVATE

## 2025-04-04 PROCEDURE — 85025 COMPLETE CBC W/AUTO DIFF WBC: CPT

## 2025-04-04 PROCEDURE — 72125 CT NECK SPINE W/O DYE: CPT

## 2025-04-04 PROCEDURE — 36415 COLL VENOUS BLD VENIPUNCTURE: CPT

## 2025-04-04 PROCEDURE — 80053 COMPREHEN METABOLIC PANEL: CPT

## 2025-04-04 PROCEDURE — 94761 N-INVAS EAR/PLS OXIMETRY MLT: CPT

## 2025-04-04 PROCEDURE — 2580000003 HC RX 258: Performed by: INTERNAL MEDICINE

## 2025-04-04 PROCEDURE — 82805 BLOOD GASES W/O2 SATURATION: CPT

## 2025-04-04 PROCEDURE — 70450 CT HEAD/BRAIN W/O DYE: CPT

## 2025-04-04 PROCEDURE — 6360000002 HC RX W HCPCS: Performed by: INTERNAL MEDICINE

## 2025-04-04 RX ORDER — MORPHINE SULFATE 2 MG/ML
1 INJECTION, SOLUTION INTRAMUSCULAR; INTRAVENOUS ONCE
Status: COMPLETED | OUTPATIENT
Start: 2025-04-04 | End: 2025-04-04

## 2025-04-04 RX ORDER — PANTOPRAZOLE SODIUM 40 MG/1
40 TABLET, DELAYED RELEASE ORAL
Status: DISCONTINUED | OUTPATIENT
Start: 2025-04-04 | End: 2025-04-07 | Stop reason: HOSPADM

## 2025-04-04 RX ORDER — DOXAZOSIN 4 MG/1
4 TABLET ORAL DAILY
Status: DISCONTINUED | OUTPATIENT
Start: 2025-04-04 | End: 2025-04-07 | Stop reason: HOSPADM

## 2025-04-04 RX ORDER — CARVEDILOL 25 MG/1
25 TABLET ORAL 2 TIMES DAILY WITH MEALS
Status: DISCONTINUED | OUTPATIENT
Start: 2025-04-04 | End: 2025-04-07 | Stop reason: HOSPADM

## 2025-04-04 RX ORDER — WARFARIN SODIUM 2 MG/1
2 TABLET ORAL
Status: DISCONTINUED | OUTPATIENT
Start: 2025-04-04 | End: 2025-04-04

## 2025-04-04 RX ORDER — DEXTROSE MONOHYDRATE AND SODIUM CHLORIDE 5; .9 G/100ML; G/100ML
INJECTION, SOLUTION INTRAVENOUS CONTINUOUS
Status: DISCONTINUED | OUTPATIENT
Start: 2025-04-04 | End: 2025-04-05

## 2025-04-04 RX ADMIN — DEXTROSE AND SODIUM CHLORIDE: 5; 900 INJECTION, SOLUTION INTRAVENOUS at 14:25

## 2025-04-04 RX ADMIN — MORPHINE SULFATE 1 MG: 2 INJECTION, SOLUTION INTRAMUSCULAR; INTRAVENOUS at 15:55

## 2025-04-04 ASSESSMENT — PAIN SCALES - GENERAL
PAINLEVEL_OUTOF10: 10
PAINLEVEL_OUTOF10: 0

## 2025-04-04 ASSESSMENT — PAIN DESCRIPTION - LOCATION: LOCATION: GENERALIZED

## 2025-04-04 ASSESSMENT — PAIN DESCRIPTION - DESCRIPTORS: DESCRIPTORS: ACHING;DISCOMFORT

## 2025-04-04 ASSESSMENT — PAIN - FUNCTIONAL ASSESSMENT: PAIN_FUNCTIONAL_ASSESSMENT: PREVENTS OR INTERFERES SOME ACTIVE ACTIVITIES AND ADLS

## 2025-04-04 NOTE — ED NOTES
ED TO INPATIENT SBAR HANDOFF    Patient Name: Duarte Reyes   :  10/15/1935  89 y.o.   Preferred Name    Family/Caregiver Present no   Restraints no   C-SSRS: Risk of Suicide: No Risk  Sitter no   Sepsis Risk Score        Situation  Chief Complaint   Patient presents with    Altered Mental Status     Brief Description of Patient's Condition: Pt presents to the ed with altered mental status. Pt had aphasia that began about 5 days and is on 2L NC at home. Pt is awake but not oriented. Pt moves around in bed with no attempt to get out. Pt is easily irritated but easily redirected.   Mental Status: disoriented  Arrived from: nursing home    Imaging:   CT SOFT TISSUE NECK WO CONTRAST   Final Result      CT CHEST ABDOMEN PELVIS WO CONTRAST Additional Contrast? None   Final Result      CT HEAD WO CONTRAST   Final Result      XR CHEST PORTABLE   Final Result        Abnormal labs:   Abnormal Labs Reviewed   CBC WITH AUTO DIFFERENTIAL - Abnormal; Notable for the following components:       Result Value    RBC 4.19 (*)     Hemoglobin 12.0 (*)     Hematocrit 39.7 (*)     MCHC 30.2 (*)     Monocytes % 12 (*)     Eosinophils % 4 (*)     All other components within normal limits   COMPREHENSIVE METABOLIC PANEL - Abnormal; Notable for the following components:    Chloride 98 (*)     CO2 37 (*)     Glucose 126 (*)     BUN 54 (*)     Creatinine 4.0 (*)     Est, Glom Filt Rate 14 (*)     ALT 8 (*)     All other components within normal limits   TROPONIN - Abnormal; Notable for the following components:    Troponin, High Sensitivity 41 (*)     All other components within normal limits   TROPONIN - Abnormal; Notable for the following components:    Troponin, High Sensitivity 38 (*)     All other components within normal limits   URINALYSIS WITH REFLEX TO CULTURE - Abnormal; Notable for the following components:    Turbidity UA SLIGHTLY CLOUDY (*)     Glucose, Ur 100 (*)     Urine Hgb LARGE (*)     Protein, UA 30 (*)     All other 
Report given to Licha ARAUZ, care transferred at this time.   
(urinary tract infection)        Assessment    Vitals: MEWS Score: 2  Level of Consciousness: Responds to voice (1)   Vitals:    04/03/25 1607 04/03/25 1610   BP: (!) 141/78    Pulse: 80    Resp: 18    Temp: 97.7 °F (36.5 °C)    TempSrc: Oral    SpO2: 100%    Weight: 100.2 kg (221 lb) 100.2 kg (220 lb 14.4 oz)   Height: 1.956 m (6' 5\")        PO Status: Nothing by Mouth    O2 Flow Rate: O2 Device: Nasal cannula O2 Flow Rate (L/min): 2 L/min    Cardiac Rhythm: NSR    Last documented pain medication administered:     NIH Score: NIH       Active LDA's:   Peripheral IV 04/03/25 Right Antecubital (Active)       Pertinent or High Risk Medications/Drips: no   If Yes, please provide details:       Blood Product Administration: no  If Yes, please provide details:     Recommendation    Incomplete orders   Additional Comments:    If any further questions, please call Sending RN at [phone]    Electronically signed by: Electronically signed by Kia Gonsales RN on 4/3/2025 at 6:49 PM

## 2025-04-04 NOTE — CARE COORDINATION
04/04/25 1317   Service Assessment   Patient Orientation Other (see comment)  (Confused and sleeping)   Cognition Other (see comment)  (Confused and sleeping)   History Provided By Medical Record;Child/Family   Primary Caregiver Family   Support Systems Family Members;Children   Prior Functional Level Assistance with the following:;Dressing;Bathing;Toileting;Mobility   Current Functional Level Bathing;Dressing;Toileting;Assistance with the following:;Mobility   Can patient return to prior living arrangement Yes   Ability to make needs known: Poor   Family able to assist with home care needs: Yes   Financial Resources Medicare;Other (Comment)  (Medicare andGeneric Commercial)   Community Resources ECF/Home Care  (active with [facility])   Social/Functional History   Lives With Daughter;Family   Type of Home House   Home Layout One level   Home Access Ramped entrance   Active  No   Patient's  Info family provides transportation.   Condition of Participation: Discharge Planning   The Patient and/or Patient Representative was provided with a Choice of Provider? Patient   The Patient and/Or Patient Representative agree with the Discharge Plan? Yes   Freedom of Choice list was provided with basic dialogue that supports the patient's individualized plan of care/goals, treatment preferences, and shares the quality data associated with the providers?  Yes     Reviewed chart, discussed in IDR, spoke with DR Cristina then pt's daughter Shasha.  Pt recently discharged from [facility] and she does not want him to return .  Plan will be home with family and HC vs hospice. If HC would like to keep [facility] . Shasha is have a family meeting tonight to discuss hospice.  Left Hospice provider in room with this CM card.  CM will continue to follow.

## 2025-04-04 NOTE — CODE DOCUMENTATION
The rapid ended at 1350 with the  ordering a CT of the head. The patient was on the backboard in the head brace and he was lifted back onto the bed where REGLA Groves, REGLA Sanchez supervisor, and REGLA Vasquez transported patient to CT.

## 2025-04-04 NOTE — ED PROVIDER NOTES
THIS IS MY [facility] SUPERVISORY AND SHARED VISIT NOTE    I independently examined and evaluated Duarte Reyes.    In brief, patient is a 89-year-old male with history of paroxysmal atrial fibrillation, congestive heart failure, chronic kidney disease stage III, ischemic cardiomyopathy, hypertension generalized weakness BPH that presents to the emergency department via EMS from home for altered mental status difficulty speaking 5 days ago.  Family at the bedside states patient was released from the nursing home 5 days ago.  They state when patient got home he was somewhat improved but still weak, not sharp as he used to be, talking out of his head.  He states he is voice is hoarse or weak or mumbled.  He states he has had nausea and vomiting last couple days.  He states he has been awake for at least 48 hours.  He states patient is blind as well.  Patient unable to give any history of present illness.  Patient is on chronic oxygen per family at home 2 L around-the-clock.  Patient here for evaluation..      CC/HPI Summary, DDx, ED Course, and Reassessment:   patient is a 89-year-old male with history of paroxysmal atrial fibrillation, congestive heart failure, chronic kidney disease stage III, ischemic cardiomyopathy, hypertension generalized weakness BPH that presents to the emergency department via EMS from home for altered mental status difficulty speaking 5 days ago.      On physical exam patient unable to give any history of present illness.  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  Patient exhibits global moves bilateral upper and lower extremities.  Patient is alert to self.  Dry mucous membranes, abdomen nontender    Differential diagnosis includes cephalopathy sepsis pneumonia UTI    Patient was ordered laboratory studies urinalysis CT head chest abdomen pelvis chest x-ray EKG      EKG per my interpretation sinus rhythm first degree AV block nonspecific intraventricular block, 
GLUCOSE   POCT GLUCOSE   POCT GLUCOSE   POCT GLUCOSE   POCT GLUCOSE   POCT GLUCOSE   POCT GLUCOSE   POCT GLUCOSE   POCT GLUCOSE   POCT GLUCOSE   POCT GLUCOSE   POCT GLUCOSE   POCT GLUCOSE   POCT GLUCOSE   POCT GLUCOSE   POCT GLUCOSE     I have reviewed and interpreted all of the currently available lab results from this visit (if applicable) Only abnormal lab results are displayed. All other labs were within normal range or not returned as of this dictation.    EKG: EKG  shows sinus rhythm with first-degree AV block.  There is no  evidence of ischemia.  When compared to ECG dated February 24, 2025 PACs no longer present.   When ordered, EKG's are interpreted by myself as well as the Emergency Department Physician in the absence of a cardiologist.  Please see their note for interpretation of EKG.    RADIOLOGY: Non-plain film images such as CT, Ultrasound and MRI are read by the radiologist. IVanda APRN - CNP have directly visualized the radiologic plain film image(s) with the below findings read by radiologist and agree with interpretation:    Interpretation per the Radiologist below, if available at the time of this note:    CT CERVICAL SPINE WO CONTRAST   Final Result      CT HEAD WO CONTRAST   Final Result      CT SOFT TISSUE NECK WO CONTRAST   Final Result      CT CHEST ABDOMEN PELVIS WO CONTRAST Additional Contrast? None   Final Result      CT HEAD WO CONTRAST   Final Result      XR CHEST PORTABLE   Final Result      Fluoroscopy modified barium swallow with video    (Results Pending)       PROCEDURES   Unless otherwise noted below, none     Procedures    CRITICAL CARE TIME       PAST MEDICAL HISTORY   Chronic Conditions:affecting care  has a past medical history of Chronic kidney disease, Heart abnormality, Hypertension, and UTI (urinary tract infection).     CC/HPI Summary, DDx, ED Course, and Reassessment:       Reviewed hospitalization dated 2/25/2025.  Patient was hospitalized for increased RUE

## 2025-04-04 NOTE — H&P
History and Physical      Name:  Duarte Reyes /Age/Sex: 10/15/1935  (89 y.o. male)   MRN & CSN:  [id number] & [id number] Encounter Date/Time: 4/3/2025  10:41 PM EDT   Location:  [address] [place] [address] PCP: Jose Napoles MD       Assessment and Plan:   Duarte Reyes is a 89 y.o. male legally blind, paroxysmal atrial fibrillation, long-term use of Coumadin, CKD stage 3b, hypertension, hyperlipidemia, chronic systolic heart failure, recent admission for pneumonia presented from home with altered mental status    Altered mental status, disorientation  JESSICA on CKD stage 3b, baseline creatinine 2.1  Cr on presentation 4>3.8  CT head CT abdomen reviewed no acute intracranial hemorrhage, no evidence of obstructive uropathy respectively.  CK normal UA + hematuria.  VBG with hypercapnia but normal pH  Likely secondary to poor oral intake in addition to Lasix and Entresto use, IV fluids given in ER monitor renal function parameters.  Swallow screen for dysphagia evaluation  May have underlying dementia, evaluate outpatient for neurocognitive testing    Paroxysmal atrial fibrillation  Long-term use of Coumadin, therapeutic INR 2.2  Continue home carvedilol  Hold Coumadin in the setting of hematuria, restart based on hemoglobin trend    Chronic systolic heart failure  Most recent echocardiogram 2024 EF 25 to 30%  Clinically hypovolemic, hold Lasix and Entresto    Inpatient MedSurg telemetry  Full code    Disposition:     Current Living situation: Home  Expected Disposition: Nursing home  Estimated D/C: 2 days    Diet Diet NPO   DVT Prophylaxis [] Lovenox, []  Heparin, [] SCDs, [] Ambulation,  [] Eliquis, [] Xarelto, [x] Coumadin   Code Status Full Code   Surrogate Decision Maker/ JANN Pulliam     Personally reviewed Lab Studies and Imaging   Discussed management of the case with ER provider who recommended admission due to altered mental status  EKG interpreted personally and results normal sinus rhythm 80/min nonspecific

## 2025-04-05 ENCOUNTER — APPOINTMENT (OUTPATIENT)
Dept: GENERAL RADIOLOGY | Age: 89
DRG: 682 | End: 2025-04-05
Payer: MEDICARE

## 2025-04-05 LAB
ANION GAP SERPL CALCULATED.3IONS-SCNC: 10 MMOL/L (ref 9–17)
BASOPHILS # BLD: 0.06 K/UL
BASOPHILS NFR BLD: 1 % (ref 0–1)
BUN SERPL-MCNC: 46 MG/DL (ref 7–20)
CALCIUM SERPL-MCNC: 8.5 MG/DL (ref 8.3–10.6)
CHLORIDE SERPL-SCNC: 105 MMOL/L (ref 99–110)
CO2 SERPL-SCNC: 32 MMOL/L (ref 21–32)
CREAT SERPL-MCNC: 3.4 MG/DL (ref 0.8–1.3)
EOSINOPHIL # BLD: 0.4 K/UL
EOSINOPHILS RELATIVE PERCENT: 5 % (ref 0–3)
ERYTHROCYTE [DISTWIDTH] IN BLOOD BY AUTOMATED COUNT: 14.6 % (ref 11.7–14.9)
GFR, ESTIMATED: 17 ML/MIN/1.73M2
GLUCOSE BLD-MCNC: 101 MG/DL (ref 74–99)
GLUCOSE BLD-MCNC: 105 MG/DL (ref 74–99)
GLUCOSE BLD-MCNC: 108 MG/DL (ref 74–99)
GLUCOSE BLD-MCNC: 111 MG/DL (ref 74–99)
GLUCOSE BLD-MCNC: 138 MG/DL (ref 74–99)
GLUCOSE SERPL-MCNC: 115 MG/DL (ref 74–99)
HCT VFR BLD AUTO: 37.7 % (ref 42–52)
HGB BLD-MCNC: 11.1 G/DL (ref 13.5–18)
IMM GRANULOCYTES # BLD AUTO: 0.03 K/UL
IMM GRANULOCYTES NFR BLD: 0 %
INR PPP: 2.7
LYMPHOCYTES NFR BLD: 2.76 K/UL
LYMPHOCYTES RELATIVE PERCENT: 31 % (ref 24–44)
MCH RBC QN AUTO: 28.2 PG (ref 27–31)
MCHC RBC AUTO-ENTMCNC: 29.4 G/DL (ref 32–36)
MCV RBC AUTO: 95.9 FL (ref 78–100)
MONOCYTES NFR BLD: 1.09 K/UL
MONOCYTES NFR BLD: 12 % (ref 0–4)
NEUTROPHILS NFR BLD: 51 % (ref 36–66)
NEUTS SEG NFR BLD: 4.44 K/UL
PLATELET # BLD AUTO: 139 K/UL (ref 140–440)
PMV BLD AUTO: 10.1 FL (ref 7.5–11.1)
POTASSIUM SERPL-SCNC: 4.1 MMOL/L (ref 3.5–5.1)
PROTHROMBIN TIME: 28.2 SEC (ref 11.7–14.5)
RBC # BLD AUTO: 3.93 M/UL (ref 4.6–6.2)
SODIUM SERPL-SCNC: 148 MMOL/L (ref 136–145)
WBC OTHER # BLD: 8.8 K/UL (ref 4–10.5)

## 2025-04-05 PROCEDURE — 85610 PROTHROMBIN TIME: CPT

## 2025-04-05 PROCEDURE — 1200000000 HC SEMI PRIVATE

## 2025-04-05 PROCEDURE — 2700000000 HC OXYGEN THERAPY PER DAY

## 2025-04-05 PROCEDURE — 94761 N-INVAS EAR/PLS OXIMETRY MLT: CPT

## 2025-04-05 PROCEDURE — 92611 MOTION FLUOROSCOPY/SWALLOW: CPT

## 2025-04-05 PROCEDURE — 80048 BASIC METABOLIC PNL TOTAL CA: CPT

## 2025-04-05 PROCEDURE — 6370000000 HC RX 637 (ALT 250 FOR IP): Performed by: STUDENT IN AN ORGANIZED HEALTH CARE EDUCATION/TRAINING PROGRAM

## 2025-04-05 PROCEDURE — 85025 COMPLETE CBC W/AUTO DIFF WBC: CPT

## 2025-04-05 PROCEDURE — 36415 COLL VENOUS BLD VENIPUNCTURE: CPT

## 2025-04-05 PROCEDURE — 74230 X-RAY XM SWLNG FUNCJ C+: CPT

## 2025-04-05 PROCEDURE — 2580000003 HC RX 258: Performed by: INTERNAL MEDICINE

## 2025-04-05 PROCEDURE — 82962 GLUCOSE BLOOD TEST: CPT

## 2025-04-05 PROCEDURE — 92526 ORAL FUNCTION THERAPY: CPT

## 2025-04-05 RX ORDER — DEXTROSE MONOHYDRATE 50 MG/ML
INJECTION, SOLUTION INTRAVENOUS CONTINUOUS
Status: DISPENSED | OUTPATIENT
Start: 2025-04-05 | End: 2025-04-06

## 2025-04-05 RX ADMIN — PANTOPRAZOLE SODIUM 40 MG: 40 TABLET, DELAYED RELEASE ORAL at 11:08

## 2025-04-05 RX ADMIN — DOXAZOSIN 4 MG: 4 TABLET ORAL at 11:10

## 2025-04-05 RX ADMIN — CARVEDILOL 25 MG: 25 TABLET, FILM COATED ORAL at 16:06

## 2025-04-05 RX ADMIN — DEXTROSE MONOHYDRATE: 50 INJECTION, SOLUTION INTRAVENOUS at 15:57

## 2025-04-05 RX ADMIN — CARVEDILOL 25 MG: 25 TABLET, FILM COATED ORAL at 11:08

## 2025-04-05 RX ADMIN — DEXTROSE AND SODIUM CHLORIDE: 5; 900 INJECTION, SOLUTION INTRAVENOUS at 13:18

## 2025-04-05 ASSESSMENT — PAIN SCALES - GENERAL
PAINLEVEL_OUTOF10: 0
PAINLEVEL_OUTOF10: 0

## 2025-04-06 LAB
ANION GAP SERPL CALCULATED.3IONS-SCNC: 9 MMOL/L (ref 9–17)
BASOPHILS # BLD: 0.05 K/UL
BASOPHILS NFR BLD: 1 % (ref 0–1)
BUN SERPL-MCNC: 45 MG/DL (ref 7–20)
CALCIUM SERPL-MCNC: 8 MG/DL (ref 8.3–10.6)
CHLORIDE SERPL-SCNC: 103 MMOL/L (ref 99–110)
CO2 SERPL-SCNC: 30 MMOL/L (ref 21–32)
CREAT SERPL-MCNC: 3.3 MG/DL (ref 0.8–1.3)
EKG ATRIAL RATE: 80 BPM
EKG DIAGNOSIS: NORMAL
EKG P AXIS: 86 DEGREES
EKG P-R INTERVAL: 212 MS
EKG Q-T INTERVAL: 424 MS
EKG QRS DURATION: 160 MS
EKG QTC CALCULATION (BAZETT): 489 MS
EKG R AXIS: -2 DEGREES
EKG T AXIS: 171 DEGREES
EKG VENTRICULAR RATE: 80 BPM
EOSINOPHIL # BLD: 0.45 K/UL
EOSINOPHILS RELATIVE PERCENT: 5 % (ref 0–3)
ERYTHROCYTE [DISTWIDTH] IN BLOOD BY AUTOMATED COUNT: 15 % (ref 11.7–14.9)
GFR, ESTIMATED: 18 ML/MIN/1.73M2
GLUCOSE BLD-MCNC: 101 MG/DL (ref 74–99)
GLUCOSE BLD-MCNC: 128 MG/DL (ref 74–99)
GLUCOSE BLD-MCNC: 132 MG/DL (ref 74–99)
GLUCOSE BLD-MCNC: 145 MG/DL (ref 74–99)
GLUCOSE BLD-MCNC: 99 MG/DL (ref 74–99)
GLUCOSE SERPL-MCNC: 99 MG/DL (ref 74–99)
HCT VFR BLD AUTO: 34.5 % (ref 42–52)
HGB BLD-MCNC: 10 G/DL (ref 13.5–18)
IMM GRANULOCYTES # BLD AUTO: 0.04 K/UL
IMM GRANULOCYTES NFR BLD: 0 %
INR PPP: 2.3
LYMPHOCYTES NFR BLD: 2.9 K/UL
LYMPHOCYTES RELATIVE PERCENT: 32 % (ref 24–44)
MCH RBC QN AUTO: 28.6 PG (ref 27–31)
MCHC RBC AUTO-ENTMCNC: 29 G/DL (ref 32–36)
MCV RBC AUTO: 98.6 FL (ref 78–100)
MONOCYTES NFR BLD: 1.12 K/UL
MONOCYTES NFR BLD: 12 % (ref 0–4)
NEUTROPHILS NFR BLD: 50 % (ref 36–66)
NEUTS SEG NFR BLD: 4.6 K/UL
PLATELET # BLD AUTO: 123 K/UL (ref 140–440)
PMV BLD AUTO: 10.8 FL (ref 7.5–11.1)
POTASSIUM SERPL-SCNC: 3.7 MMOL/L (ref 3.5–5.1)
PROTHROMBIN TIME: 24.9 SEC (ref 11.7–14.5)
RBC # BLD AUTO: 3.5 M/UL (ref 4.6–6.2)
SODIUM SERPL-SCNC: 142 MMOL/L (ref 136–145)
WBC OTHER # BLD: 9.2 K/UL (ref 4–10.5)

## 2025-04-06 PROCEDURE — 1200000000 HC SEMI PRIVATE

## 2025-04-06 PROCEDURE — 80048 BASIC METABOLIC PNL TOTAL CA: CPT

## 2025-04-06 PROCEDURE — 85610 PROTHROMBIN TIME: CPT

## 2025-04-06 PROCEDURE — 97166 OT EVAL MOD COMPLEX 45 MIN: CPT

## 2025-04-06 PROCEDURE — 36415 COLL VENOUS BLD VENIPUNCTURE: CPT

## 2025-04-06 PROCEDURE — 6370000000 HC RX 637 (ALT 250 FOR IP): Performed by: INTERNAL MEDICINE

## 2025-04-06 PROCEDURE — 93010 ELECTROCARDIOGRAM REPORT: CPT | Performed by: INTERNAL MEDICINE

## 2025-04-06 PROCEDURE — 97530 THERAPEUTIC ACTIVITIES: CPT

## 2025-04-06 PROCEDURE — 6370000000 HC RX 637 (ALT 250 FOR IP): Performed by: STUDENT IN AN ORGANIZED HEALTH CARE EDUCATION/TRAINING PROGRAM

## 2025-04-06 PROCEDURE — 94761 N-INVAS EAR/PLS OXIMETRY MLT: CPT

## 2025-04-06 PROCEDURE — 82962 GLUCOSE BLOOD TEST: CPT

## 2025-04-06 PROCEDURE — 97163 PT EVAL HIGH COMPLEX 45 MIN: CPT

## 2025-04-06 PROCEDURE — 85025 COMPLETE CBC W/AUTO DIFF WBC: CPT

## 2025-04-06 PROCEDURE — 2700000000 HC OXYGEN THERAPY PER DAY

## 2025-04-06 RX ORDER — POLYETHYLENE GLYCOL 3350 17 G/17G
17 POWDER, FOR SOLUTION ORAL DAILY
Status: DISCONTINUED | OUTPATIENT
Start: 2025-04-06 | End: 2025-04-07 | Stop reason: HOSPADM

## 2025-04-06 RX ORDER — FINASTERIDE 5 MG/1
5 TABLET, FILM COATED ORAL DAILY
Status: DISCONTINUED | OUTPATIENT
Start: 2025-04-06 | End: 2025-04-07 | Stop reason: HOSPADM

## 2025-04-06 RX ORDER — SENNA AND DOCUSATE SODIUM 50; 8.6 MG/1; MG/1
2 TABLET, FILM COATED ORAL 2 TIMES DAILY
Status: DISCONTINUED | OUTPATIENT
Start: 2025-04-06 | End: 2025-04-07 | Stop reason: HOSPADM

## 2025-04-06 RX ADMIN — CARVEDILOL 25 MG: 25 TABLET, FILM COATED ORAL at 08:50

## 2025-04-06 RX ADMIN — CARVEDILOL 25 MG: 25 TABLET, FILM COATED ORAL at 17:59

## 2025-04-06 RX ADMIN — DOXAZOSIN 4 MG: 4 TABLET ORAL at 08:50

## 2025-04-06 RX ADMIN — EMPAGLIFLOZIN 10 MG: 10 TABLET, FILM COATED ORAL at 14:03

## 2025-04-06 RX ADMIN — FINASTERIDE 5 MG: 5 TABLET, FILM COATED ORAL at 14:03

## 2025-04-06 RX ADMIN — PANTOPRAZOLE SODIUM 40 MG: 40 TABLET, DELAYED RELEASE ORAL at 06:11

## 2025-04-06 RX ADMIN — SENNOSIDES AND DOCUSATE SODIUM 2 TABLET: 50; 8.6 TABLET ORAL at 14:03

## 2025-04-06 RX ADMIN — POLYETHYLENE GLYCOL (3350) 17 G: 17 POWDER, FOR SOLUTION ORAL at 14:03

## 2025-04-06 ASSESSMENT — PAIN SCALES - GENERAL: PAINLEVEL_OUTOF10: 0

## 2025-04-06 NOTE — CONSULTS
Clinical Pharmacy Progress Note    Warfarin has been discontinued by . Pharmacy will sign off. Please re-consult pharmacy if Warfarin dosing is wanted in the future.    Please call with questions.    Dandre Jefferson, PharmD, [facility]   [facility] [id number]  4/4/2025 3:38 AM        
Comprehensive Nutrition Assessment    Type and Reason for Visit:  Initial, Wound, Consult (James Score)    Nutrition Recommendations/Plan:   Diet advancement per SLP  Recommend to order standard high calorie, high protein oral nutrition supplement BID at least once diet advances   Please help pt with meal order and meal set up due to poor vision  Monitor GI status, weights, fluids, labs, lytes, glucose, and plan of care      Malnutrition Assessment:  Malnutrition Status:  Insufficient data (04/04/25 1646)    Context:  Acute Illness       Nutrition Assessment:    Admitted with JESSICA, AMS. Currently on 2L NC, had fallen/rapid response earlier. Unable to advance po diet per MBSS due to significant event. Pt is blind, requires full meal assist. Known to RD team. Pt has reduced portions sizes likely related to advanced aging. Stable wt x 1 yr. Pt usually drinks oral nutrition supplements BID at home, recommend to order once diet is cleared by speech. Noted pressure injury stage II present. Follow as high nutrition risk.    Nutrition Related Findings:    hx of N/V/GERD symptoms at mealtime +D5@ 50 with NS +protonix Wound Type: Pressure Injury, Stage II       Current Nutrition Intake & Therapies:    Average Meal Intake: NPO  Average Supplements Intake: NPO  Diet NPO    Anthropometric Measures:  Height: 195.6 cm (6' 5\")  Ideal Body Weight (IBW): 208 lbs (95 kg)       Current Body Weight: 100.2 kg (220 lb 14.4 oz), 106.2 % IBW. Weight Source: Stated  Current BMI (kg/m2): 26.2  Usual Body Weight: 101.6 kg (223 lb 14.4 oz) (4/25/24 til 10/2024 office visits, 215 lb 2023)     % Weight Change (Calculated): -1.3  Weight Adjustment For: No Adjustment                 BMI Categories: Overweight (BMI 25.0-29.9)    Estimated Daily Nutrient Needs:  Energy Requirements Based On: Kcal/kg  Weight Used for Energy Requirements: Current  Energy (kcal/day): 4577-8464 (22-25 kcals/kg)  Weight Used for Protein Requirements: Current  Protein 
PHARMACY ANTICOAGULATION MONITORING SERVICE    Duarte Reyes is a 89 y.o. male on warfarin therapy. Pharmacy consulted by Dr. Keating for monitoring and adjustment of treatment.    Indication for anticoagulation: Afib  INR goal: 2-3  Warfarin dose prior to admission: Last known dose before admission was 2mg daily (3/31/25 - Dr. Jose Napoles, pt was previously had an elevated INR so dose was temporarily reduced pending INR review).  Before this dose reduction to 2mg daily, last dispensed prescription was 3/26/25 for warfarin 4mg daily with 2mg tablets being dispensed.    Pertinent Laboratory Values   Recent Labs     04/04/25  0200 04/05/25  0208 04/05/25  0949 04/06/25  0206   INR  --    < >  --  2.3   HGB 10.9*  --  11.1* 10.0*   HCT 38.7*  --  37.7* 34.5*   *  --  139* 123*    < > = values in this interval not displayed.     Recent Warfarin doses given this admission...  Date INR Result Warfarin Dose Given   4/6 2.3 none                    Assessment/Plan:  Drug Interactions:   Home meds: no significant interactions noted  No new significant interactions identified   INR therapeutic @2.3 today.  The INR has been therapeutic since admission even though the patient has not received any warfarin since he got here on 4/3/25.  Warfarin held previously in the setting of hematuria.  HGB with a downward trend.  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  Will hold warfarin dose again tonight and follow INR trends tomorrow since INR remains therapeutic without warfarin.  Pharmacy will continue to monitor and adjust warfarin therapy as indicated    Thank you for the consult.  Rufus Smith [facility]  4/6/2025 11:37 AM            
powder as needed 1/25/24   David Bone MD   Melatonin 10 MG TABS Take 10 mg by mouth daily    David Bone MD   [employer] FIBER GUMMIES PO Take by mouth  Patient not taking: Reported on 2/20/2025    David Bone MD   AZO CRANBERRY GUMMIES PO Take by mouth    David Bone MD   magnesium hydroxide (MILK OF MAGNESIA) 400 MG/5ML suspension Take by mouth as needed for Constipation    David Bone MD   finasteride (PROSCAR) 5 MG tablet Take 1 tablet by mouth daily 2/22/23   David Bone MD   Multiple Vitamins-Minerals (PRESERVISION AREDS 2 PO) Take 1 capsule by mouth daily    David Bone MD   docusate sodium (COLACE) 100 MG capsule Take 1 capsule by mouth daily 10/1/22   David Bone MD   latanoprost (XALATAN) 0.005 % ophthalmic solution Place 1 drop into the right eye nightly 9/14/22   David Bone MD   ondansetron (ZOFRAN) 4 MG tablet Take 1 tablet by mouth every 8 hours as needed for Nausea or Vomiting    David Bone MD   terazosin (HYTRIN) 5 MG capsule Take 1 capsule by mouth nightly 6/21/22   Jim Rosas MD   warfarin (COUMADIN) 5 MG tablet Take 1 tablet by mouth daily  Patient taking differently: Take 4 mg by mouth nightly 02/20/25 patient takes 4 mg at HS 6/21/22   Jim Rosas MD        carvedilol (COREG) tablet 25 mg, BID WC  pantoprazole (PROTONIX) tablet 40 mg, QAM AC  doxazosin (CARDURA) tablet 4 mg, Daily  ondansetron (ZOFRAN) injection 4 mg, Q6H PRN        Allergies   Patient has no known allergies.    Social History     Social History       Tobacco History       Smoking Status  Never      Smokeless Tobacco Use  Never              Alcohol History       Alcohol Use Status  Never Comment  Caffeine: 4-5 per week              Drug Use       Drug Use Status  Never              Sexual Activity       Sexually Active  Not Asked                    Family History     Family History   Problem Relation Age of Onset    Kidney 
04/04/25 0910   Drainage Amount Scant (moist but unmeasurable) 04/04/25 0910   Drainage Description Serosanguinous 04/04/25 0910   Odor None 04/04/25 0910   Nae-wound Assessment Dry/flaky 04/04/25 0910   Margins Defined edges 04/04/25 0910   Wound Thickness Description not for Pressure Injury Partial thickness 04/04/25 0910   Number of days: 0       Response to treatment:  Well tolerated by patient.     Pain Assessment:  Severity:  none  Quality of pain: na  Wound Pain Timing/Severity: na  Premedicated: no    Plan:     Plan of Care: Wound 04/04/25 Sacrum Left buttock cluster-Dressing/Treatment: Silicone border    Pt in bed. Agreeable to wound assessment. Toes with some dry eschar which comes off. Healed. Left buttock/sacrum with stage 2 cluster with MASD contributing. Silicone foam border recommended and applied. Heels intact. Atmos air pump applied to mattress. Updated nurse. Pt is a mild risk for skin breakdown AEB James. Recommend repositioning with wedges and floating heels with pillows. Follow James orders.     Specialty Bed Required : yes  [] Low Air Loss   [x] Pressure Redistribution  [] Fluid Immersion  [] Bariatric  [] Total Pressure Relief  [] Other:     Discharge Plan:  Placement for patient upon discharge: tbd  Hospice Care: no  Patient appropriate for Outpatient Wound Care Center: no    Patient/Caregiver Teaching:  Level of patient/caregiver understanding able to:   Needs reinforcement.        Electronically signed by Ehsan Singleton RN, CWOCN on 4/4/2025 at 10:50 AM

## 2025-04-07 VITALS
RESPIRATION RATE: 14 BRPM | HEART RATE: 77 BPM | TEMPERATURE: 98.5 F | BODY MASS INDEX: 29.39 KG/M2 | WEIGHT: 248.9 LBS | HEIGHT: 77 IN | SYSTOLIC BLOOD PRESSURE: 145 MMHG | DIASTOLIC BLOOD PRESSURE: 64 MMHG | OXYGEN SATURATION: 100 %

## 2025-04-07 LAB
ANION GAP SERPL CALCULATED.3IONS-SCNC: 8 MMOL/L (ref 9–17)
BASOPHILS # BLD: 0.05 K/UL
BASOPHILS NFR BLD: 1 % (ref 0–1)
BUN SERPL-MCNC: 38 MG/DL (ref 7–20)
CALCIUM SERPL-MCNC: 8 MG/DL (ref 8.3–10.6)
CHLORIDE SERPL-SCNC: 103 MMOL/L (ref 99–110)
CO2 SERPL-SCNC: 30 MMOL/L (ref 21–32)
CREAT SERPL-MCNC: 2.8 MG/DL (ref 0.8–1.3)
EOSINOPHIL # BLD: 0.47 K/UL
EOSINOPHILS RELATIVE PERCENT: 6 % (ref 0–3)
ERYTHROCYTE [DISTWIDTH] IN BLOOD BY AUTOMATED COUNT: 14.5 % (ref 11.7–14.9)
GFR, ESTIMATED: 21 ML/MIN/1.73M2
GLUCOSE BLD-MCNC: 86 MG/DL (ref 74–99)
GLUCOSE BLD-MCNC: 93 MG/DL (ref 74–99)
GLUCOSE BLD-MCNC: 99 MG/DL (ref 74–99)
GLUCOSE SERPL-MCNC: 107 MG/DL (ref 74–99)
HCT VFR BLD AUTO: 32 % (ref 42–52)
HGB BLD-MCNC: 9.4 G/DL (ref 13.5–18)
IMM GRANULOCYTES # BLD AUTO: 0.02 K/UL
IMM GRANULOCYTES NFR BLD: 0 %
INR PPP: 1.7
LYMPHOCYTES NFR BLD: 2.55 K/UL
LYMPHOCYTES RELATIVE PERCENT: 33 % (ref 24–44)
MCH RBC QN AUTO: 28.5 PG (ref 27–31)
MCHC RBC AUTO-ENTMCNC: 29.4 G/DL (ref 32–36)
MCV RBC AUTO: 97 FL (ref 78–100)
MONOCYTES NFR BLD: 0.94 K/UL
MONOCYTES NFR BLD: 12 % (ref 0–4)
NEUTROPHILS NFR BLD: 48 % (ref 36–66)
NEUTS SEG NFR BLD: 3.67 K/UL
PLATELET # BLD AUTO: 114 K/UL (ref 140–440)
PMV BLD AUTO: 10.8 FL (ref 7.5–11.1)
POTASSIUM SERPL-SCNC: 3.8 MMOL/L (ref 3.5–5.1)
PROTHROMBIN TIME: 20 SEC (ref 11.7–14.5)
RBC # BLD AUTO: 3.3 M/UL (ref 4.6–6.2)
SODIUM SERPL-SCNC: 141 MMOL/L (ref 136–145)
WBC OTHER # BLD: 7.7 K/UL (ref 4–10.5)

## 2025-04-07 PROCEDURE — 80048 BASIC METABOLIC PNL TOTAL CA: CPT

## 2025-04-07 PROCEDURE — 85025 COMPLETE CBC W/AUTO DIFF WBC: CPT

## 2025-04-07 PROCEDURE — 6370000000 HC RX 637 (ALT 250 FOR IP): Performed by: INTERNAL MEDICINE

## 2025-04-07 PROCEDURE — 36415 COLL VENOUS BLD VENIPUNCTURE: CPT

## 2025-04-07 PROCEDURE — 6370000000 HC RX 637 (ALT 250 FOR IP): Performed by: STUDENT IN AN ORGANIZED HEALTH CARE EDUCATION/TRAINING PROGRAM

## 2025-04-07 PROCEDURE — 85610 PROTHROMBIN TIME: CPT

## 2025-04-07 PROCEDURE — 82962 GLUCOSE BLOOD TEST: CPT

## 2025-04-07 PROCEDURE — 92526 ORAL FUNCTION THERAPY: CPT

## 2025-04-07 RX ORDER — WARFARIN SODIUM 2 MG/1
2 TABLET ORAL
Status: DISCONTINUED | OUTPATIENT
Start: 2025-04-07 | End: 2025-04-07 | Stop reason: HOSPADM

## 2025-04-07 RX ORDER — LACTULOSE 10 G/15ML
20 SOLUTION ORAL 3 TIMES DAILY
Status: DISCONTINUED | OUTPATIENT
Start: 2025-04-07 | End: 2025-04-07 | Stop reason: HOSPADM

## 2025-04-07 RX ORDER — LACTULOSE 10 G/15ML
20 SOLUTION ORAL 3 TIMES DAILY
Qty: 473 ML | Refills: 0 | Status: SHIPPED | OUTPATIENT
Start: 2025-04-07 | End: 2025-04-13

## 2025-04-07 RX ADMIN — EMPAGLIFLOZIN 10 MG: 10 TABLET, FILM COATED ORAL at 09:10

## 2025-04-07 RX ADMIN — CARVEDILOL 25 MG: 25 TABLET, FILM COATED ORAL at 17:04

## 2025-04-07 RX ADMIN — LACTULOSE 20 G: 20 SOLUTION ORAL at 17:04

## 2025-04-07 RX ADMIN — PANTOPRAZOLE SODIUM 40 MG: 40 TABLET, DELAYED RELEASE ORAL at 05:59

## 2025-04-07 RX ADMIN — DOXAZOSIN 4 MG: 4 TABLET ORAL at 09:09

## 2025-04-07 RX ADMIN — FINASTERIDE 5 MG: 5 TABLET, FILM COATED ORAL at 09:10

## 2025-04-07 RX ADMIN — CARVEDILOL 25 MG: 25 TABLET, FILM COATED ORAL at 09:10

## 2025-04-07 RX ADMIN — Medication 1 ENEMA: at 09:12

## 2025-04-07 RX ADMIN — POLYETHYLENE GLYCOL (3350) 17 G: 17 POWDER, FOR SOLUTION ORAL at 09:10

## 2025-04-07 RX ADMIN — SENNOSIDES AND DOCUSATE SODIUM 2 TABLET: 50; 8.6 TABLET ORAL at 09:10

## 2025-04-07 NOTE — DISCHARGE SUMMARY
3.3* 2.8*   WBC 8.8 9.2 7.7   HCT 37.7* 34.5* 32.0*   * 123* 114*       IMAGING:      Additional Information: Patient seen and examined day of discharge. For more information regarding patient's care please contact [facility] records [phone]    Discharge Time of 35 minutes    Electronically signed by Liyah Keating MD on 4/7/2025 at 1:30 PM

## 2025-04-07 NOTE — CARE COORDINATION
[facility] Liaison spoke with Jennifer and reviewed demo info. Also aware of discharge & will initiate resuming [facility]HC.

## 2025-04-07 NOTE — PROGRESS NOTES
POST FALL MANAGEMENT    Duarte Reyes  MEDICAL RECORD NUMBER:  [id number]  AGE: 89 y.o.   GENDER: male  : 10/15/1935  TODAYS DATE:  2025    Details     Fall Occurred: Yes    Was the Fall Witnessed:  No      Brief Review of Event: Bed alarm was going off and patient's nurse was leaving one room and found patient on the floor.        Who found the patient: Alyssa Fish RN  Where was the patient at the time of the fall: floor        Patient Comments: I tripped on the bedside table, trying to go to the bathroom, but didn't hit my head.       Date Fall Occurred:  2025 .       Time Fall Occurred: 1:36p.m.     Assessment     Post Fall Head to Toe Assessment Completed: Yes    Post Fall Predictive Analytic Score Reviewed: Yes     Post Fall Vitals Completed: Yes    Post Fall Neuro Checks Completed: Yes    Injury Occurred(if yes, describe injury):  no           Did the Patient Experience:(Check Christian all that apply)    [] Patient hit head  [] Loss of consciousness  [] Change in mental status following the fall  [] Patient is on an anticoagulant medication      CT Performed:  yes    Follow-up     Persons Notified of Fall:  (Provide names of persons notified)   [x] Physician:   [] MOHAN:  [x] Nursing Supervisior:  [x] Manager:  [] Pharmacist:  [x] Family:  [] Other:      Electronically signed by Carol Maldonado RN 2025 at 7:10 PM     
  Nephrology Progress Note  4/5/2025 8:11 AM  Subjective:     Interval History: Duarte Reyes is a 89 y.o. male with doing okay in general weak tired resting in bed is more verbal interactive today        Data:   Scheduled Meds:   carvedilol  25 mg Oral BID WC    pantoprazole  40 mg Oral QAM AC    doxazosin  4 mg Oral Daily     Continuous Infusions:   dextrose 5 % and 0.9 % NaCl 50 mL/hr at 04/04/25 1425         CBC   Recent Labs     04/03/25  1619 04/04/25  0200   WBC 8.4 8.8   HGB 12.0* 10.9*   HCT 39.7* 38.7*    134*      BMP   Recent Labs     04/03/25  1619 04/04/25  0200    145   K 4.5 5.2*   CL 98* 104   CO2 37* 32   BUN 54* 51*   CREATININE 4.0* 3.8*     Hepatic:   Recent Labs     04/03/25  1619 04/04/25  0200   AST 19 22   ALT 8* 5*   BILITOT 0.6 0.5   ALKPHOS 75 67     Troponin: No results for input(s): \"TROPONINI\" in the last 72 hours.  BNP: No results for input(s): \"BNP\" in the last 72 hours.  Lipids: No results for input(s): \"CHOL\", \"HDL\" in the last 72 hours.    Invalid input(s): \"LDLCALCU\"  ABGs:   Lab Results   Component Value Date/Time    PHART NO SPECIMEN RECEIVED 02/20/2025 10:41 AM    PO2ART NO SPECIMEN RECEIVED 02/20/2025 10:41 AM    [id number] NO SPECIMEN RECEIVED 02/20/2025 10:41 AM     INR:   Recent Labs     04/03/25  2142 04/05/25  0208   INR 2.2 2.7     Renal Labs  Albumin:  No results found for: \"LABALBU\"  Calcium:    Lab Results   Component Value Date/Time    CALCIUM 9.4 04/04/2025 02:00 AM     Phosphorus:    Lab Results   Component Value Date/Time    PHOS 2.4 02/24/2025 01:24 AM     U/A:    Lab Results   Component Value Date/Time    NITRU NEGATIVE 04/03/2025 06:12 PM    COLORU Yellow 04/03/2025 06:12 PM    PHUR 8.0 04/03/2025 06:12 PM    WBCUA 7 04/03/2025 06:12 PM    RBCUA 1479 04/03/2025 06:12 PM    RBCUA <1 10/27/2023 10:00 AM    MUCUS RARE 05/08/2023 08:30 AM    TRICHOMONAS NONE SEEN 10/27/2023 10:00 AM    YEAST RARE 05/08/2023 08:30 AM    BACTERIA NEGATIVE 10/27/2023 10:00 
  Nephrology Progress Note  4/6/2025 8:01 AM  Subjective:     Interval History: Duarte Reyes is a 89 y.o. male doing somewhat better more awake understands remembers who I am        Data:   Scheduled Meds:   carvedilol  25 mg Oral BID WC    pantoprazole  40 mg Oral QAM AC    doxazosin  4 mg Oral Daily     Continuous Infusions:   dextrose 50 mL/hr at 04/05/25 1557         CBC   Recent Labs     04/04/25  0200 04/05/25  0949 04/06/25  0206   WBC 8.8 8.8 9.2   HGB 10.9* 11.1* 10.0*   HCT 38.7* 37.7* 34.5*   * 139* 123*      BMP   Recent Labs     04/04/25  0200 04/05/25  0949 04/06/25  0206    148* 142   K 5.2* 4.1 3.7    105 103   CO2 32 32 30   BUN 51* 46* 45*   CREATININE 3.8* 3.4* 3.3*     Hepatic:   Recent Labs     04/03/25  1619 04/04/25  0200   AST 19 22   ALT 8* 5*   BILITOT 0.6 0.5   ALKPHOS 75 67     Troponin: No results for input(s): \"TROPONINI\" in the last 72 hours.  BNP: No results for input(s): \"BNP\" in the last 72 hours.  Lipids: No results for input(s): \"CHOL\", \"HDL\" in the last 72 hours.    Invalid input(s): \"LDLCALCU\"  ABGs:   Lab Results   Component Value Date/Time    PHART NO SPECIMEN RECEIVED 02/20/2025 10:41 AM    PO2ART NO SPECIMEN RECEIVED 02/20/2025 10:41 AM    [id number] NO SPECIMEN RECEIVED 02/20/2025 10:41 AM     INR:   Recent Labs     04/03/25  2142 04/05/25  0208 04/06/25  0206   INR 2.2 2.7 2.3     Renal Labs  Albumin:  No results found for: \"LABALBU\"  Calcium:    Lab Results   Component Value Date/Time    CALCIUM 8.0 04/06/2025 02:06 AM     Phosphorus:    Lab Results   Component Value Date/Time    PHOS 2.4 02/24/2025 01:24 AM     U/A:    Lab Results   Component Value Date/Time    NITRU NEGATIVE 04/03/2025 06:12 PM    COLORU Yellow 04/03/2025 06:12 PM    PHUR 8.0 04/03/2025 06:12 PM    WBCUA 7 04/03/2025 06:12 PM    RBCUA 1479 04/03/2025 06:12 PM    RBCUA <1 10/27/2023 10:00 AM    MUCUS RARE 05/08/2023 08:30 AM    TRICHOMONAS NONE SEEN 10/27/2023 10:00 AM    YEAST RARE 
  Nephrology Progress Note  4/7/2025 10:56 AM  Subjective:     Interval History: Duarte Reyes is a 89 y.o. male appears to be doing okay in general feels he needs to have a bowel movement      Data:   Scheduled Meds:   lactulose  20 g Oral TID    polyethylene glycol  17 g Oral Daily    sennosides-docusate sodium  2 tablet Oral BID    empagliflozin  10 mg Oral Daily    finasteride  5 mg Oral Daily    warfarin placeholder: dosing by pharmacy   Oral RX Placeholder    carvedilol  25 mg Oral BID WC    pantoprazole  40 mg Oral QAM AC    doxazosin  4 mg Oral Daily     Continuous Infusions:          CBC   Recent Labs     04/05/25  0949 04/06/25  0206 04/07/25  0151   WBC 8.8 9.2 7.7   HGB 11.1* 10.0* 9.4*   HCT 37.7* 34.5* 32.0*   * 123* 114*      BMP   Recent Labs     04/05/25  0949 04/06/25  0206 04/07/25  0151   * 142 141   K 4.1 3.7 3.8    103 103   CO2 32 30 30   BUN 46* 45* 38*   CREATININE 3.4* 3.3* 2.8*     Hepatic:   No results for input(s): \"AST\", \"ALT\", \"BILITOT\", \"ALKPHOS\" in the last 72 hours.    Invalid input(s): \"ALB\"    Troponin: No results for input(s): \"TROPONINI\" in the last 72 hours.  BNP: No results for input(s): \"BNP\" in the last 72 hours.  Lipids: No results for input(s): \"CHOL\", \"HDL\" in the last 72 hours.    Invalid input(s): \"LDLCALCU\"  ABGs:   Lab Results   Component Value Date/Time    PHART NO SPECIMEN RECEIVED 02/20/2025 10:41 AM    PO2ART NO SPECIMEN RECEIVED 02/20/2025 10:41 AM    [id number] NO SPECIMEN RECEIVED 02/20/2025 10:41 AM     INR:   Recent Labs     04/05/25  0208 04/06/25  0206 04/07/25  0151   INR 2.7 2.3 1.7     Renal Labs  Albumin:  No results found for: \"LABALBU\"  Calcium:    Lab Results   Component Value Date/Time    CALCIUM 8.0 04/07/2025 01:51 AM     Phosphorus:    Lab Results   Component Value Date/Time    PHOS 2.4 02/24/2025 01:24 AM     U/A:    Lab Results   Component Value Date/Time    NITRU NEGATIVE 04/03/2025 06:12 PM    COLORU Yellow 04/03/2025 06:12 
4 Eyes Skin Assessment     NAME:  Duarte Reyes  YOB: 1935  MEDICAL RECORD NUMBER:  [id number]    The patient is being assessed for  Admission    I agree that at least one RN has performed a thorough Head to Toe Skin Assessment on the patient. ALL assessment sites listed below have been assessed.      Areas assessed by both nurses:    Head, Face, Ears, Shoulders, Back, Chest, Arms, Elbows, Hands, Sacrum. Buttock, Coccyx, Ischium, Legs. Feet and Heels, and Under Medical Devices         Does the Patient have a Wound? Yes wound(s) were present on assessment. LDA wound assessment was Initiated and completed by RN     Bruises BUE , Scratches to upper and lower back, excoriation and redness to sacrum, open area to coccyx stage 2,Scrotum red, dry flaky feet- wound to tight 3rd toe , left 3rd/4th toe, heels pink and soft.          James Prevention initiated by RN: Yes  Wound Care Orders initiated by RN: Yes    Pressure Injury (Stage 3,4, Unstageable, DTI, NWPT, and Complex wounds) if present, place Wound referral order by RN under : No    New Ostomies, if present place, Ostomy referral order under : No     Nurse 1 eSignature: Electronically signed by Barb Dillard RN on 4/4/25 at 1:29 AM EDT    **SHARE this note so that the co-signing nurse can place an eSignature**    Nurse 2 eSignature: Electronically signed by Kathi Torres RN on 4/4/25 at 1:38 AM EDT     
CT completed, pt. back to room, alert and oriented.   
PHARMACY ANTICOAGULATION MONITORING SERVICE    Duarte Reyes is a 89 y.o. male on warfarin therapy for Afib. Pharmacy consulted by Dr. Pito Napoles MD for monitoring and adjustment of treatment.    Indication for anticoagulation: Afib  INR goal: 2-3   Warfarin dose prior to admission: Warfarin 2 mg daily per Dr. Jose Napoles from [facility]    Pertinent Laboratory Values   Recent Labs     04/03/25  1619 04/03/25  2142 04/04/25  0200   INR  --  2.2  --    HGB 12.0*  --  10.9*   HCT 39.7*  --  38.7*     --  134*     Recent Warfarin doses given this admission...  Date INR Result Warfarin Dose Given   4/4 2.2 2 mg (ordered)                    Assessment/Plan:  Drug Interactions: No significant drug interactions noted   INR 2.2 therapeutic for goal, of note Patient previously had supratherapeutic INR of 6.1 and was restarted on warfarin 2 mg daily per [facility] provider Dr. Jose Napoles on 3/31   Will continue warfarin 2 mg [facility]   Pharmacy will continue to monitor and adjust warfarin therapy as indicated    Thank you for the consult.  Dandre Jefferson RPH  4/4/2025 3:29 AM            
PHARMACY ANTICOAGULATION MONITORING SERVICE    Duarte Reyes is a 89 y.o. male on warfarin therapy. Pharmacy consulted by Dr. Keating for monitoring and adjustment of treatment.    Indication for anticoagulation: Afib  INR goal: 2-3  Warfarin dose prior to admission: Last known dose before admission was 2mg daily (3/31/25 - Dr. Jose Napoles, pt was previously had an elevated INR so dose was temporarily reduced pending INR review).  Before this dose reduction to 2mg daily, last dispensed prescription was 3/26/25 for warfarin 4mg daily with 2mg tablets being dispensed.    Pertinent Laboratory Values   Recent Labs     04/05/25  0949 04/06/25  0206 04/07/25  0151   INR  --  2.3 1.7   HGB 11.1* 10.0* 9.4*   HCT 37.7* 34.5* 32.0*   * 123* 114*     Recent Warfarin doses given this admission...  Date INR Result Warfarin Dose Given   4/6 2.3 None   04/07 1.7 2 mg (ordered)               Assessment/Plan:  Drug Interactions:   Home meds: no significant interactions noted  No new significant interactions identified   INR of 1.7 is now sub-therapeutic for goal  INR has previously been therapeutic since admission on 04/03 despite holding doses for hematuria.  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 May require lower doses of warfarin.  Discussed case with attending and ok to resume warfarin dosing at this time.  Will give warfarin 2 mg for tonight and follow INR trend tomorrow  Pharmacy will continue to monitor and adjust warfarin therapy as indicated    Thank you for the consult,  Gissel Mejia RP, PharmD.  4/7/2025 11:28 AM    
Pt HR was reported to be in the 140's  and went down to below 100 within seconds as reported by the Tele Tech while at at the bedside this a.m with attending. Pt. denied chest pain and any form of discomfort, vital signs WNL. Noted on tele strip that pt. threw 13 V-TACH . Pt. is more alert and oriented and still denies pain, and any form of abnormality at this time. Notified attending and said to monitor pt. She is currently eating dinner with no concern. Night shift nurse to continue monitoring pt.  
Speech-Language Pathology Department  Duarte Reyes  10/15/1935  [id number]    Attempted to complete modified barium swallow study 04/04/25.  Pt had a fall/rapid response as transport was arriving to room to take patient to fluoroscopy.  Will defer modified barium swallow study this date.  SLP will follow up with pt later today for safe diet level initiation and re-attempt MBSS when pt is able to participate.    Attempted to see pt at bedside for an ongoing swallowing assessment 3:46 PM- nursing deferred, pt needs to remain laying flat at this time until completion of CT of cervical spine.  SLP will re-attempt as schedule allows.      Ginny Krueger MS, CCC-SLP, 4/4/2025   
[facility]  DEPARTMENT OF SPEECH/LANGUAGE PATHOLOGY  DAILY PROGRESS NOTE  Duarte Reyes  4/7/2025  [id number]  Dehydration [E86.0]  JESSICA (acute kidney injury) [N17.9]  Altered mental status, unspecified altered mental status type [R41.82]  Acute kidney injury superimposed on CKD [N17.9, N18.9]  No Known Allergies      Pt was seen this date for dysphagia treatment.     IMPRESSION AND RECOMMENDATIONS: Duarte Reyes was seen for dysphagia treatment and diet tolerance monitoring. Daughter and son-in-law present for treatment, pt reports he lives with daughter but is mostly bed bound in a hospital bed at home. MBS completed 4/5 showed intermittent mod depth penetration without aspiration. While no aspiration seen during study, pt is noted to have a cough following completion of MBS while drinking water. Family endorses intermittent coughing with meals and home and recurrent PNA. RAVEN reports that he just finished feeding patient (pt is generally helped during meals d/t blindness) with reports of difficulty/coughing with meal. Pt consumed thin liquids via straw PO trials only as pt just finished lunch. No overt s/s of aspiration with small, single sips of thin liquids via straw with pt in a fully upright position. Utilizing video feedback, pt/family educated on results of MBS (explained results to pt as he is blind). Explained that no aspiration was visualized during study, however, that does not completely r/o intermittent aspiration events. Went over aspiration precautions and recommendation of increased oral care at home to reduce oral bacterial load. Pt/family verbalized understanding.     Recommend diet of soft and bite sized diet/thin liquids - straws ok. Small, single sips. Medications as tolerated. Ensure pt is in an upright positioning for all intake and remains upright for 30-45 minutes post-intake. Would recommend continued dysphagia tx during IP stay to address these deficits. RN 
will demonstrate comprehension of recommendations/POC Progressing, Continue    EDUCATION: Recommendations/POC    PAIN RATING (0-10 Scale): denies  Time in/Time out: SLP Individual Minutes  Time In: 1142  Time Out: 1158  Minutes: 16    Visit number: 2    ALEKSEY Lozano  4/5/2025  12:11 PM    
L  Communication Observation: Functional  Follows Directions: Complex  Dentition: Adequate  Patient Positioning: Upright in bed  Baseline Vocal Quality: Normal  Volitional Cough: Weak  Volitional Swallow: Delayed  Prior Dysphagia History: pt denies  Consistencies Administered: Minced and Moist;Pureed;Thin - cup;Thin - straw;Thin - teaspoon    Vision/Hearing       Oral Motor Deficits  Labial: No impairment  Lingual: No impairment  Consistencies Administered: Minced and Moist;Pureed;Thin - cup;Thin - straw;Thin - teaspoon    Oral Phase Dysfunction  Oral Phase  Oral Phase: Exceptions     Indicators of Pharyngeal Phase Dysfunction        Prognosis       Education  Patient Education: recommendaitons/POC  Patient Education Response: Verbalizes understanding             Therapy Time  SLP Individual Minutes  Time In: 1000  Time Out: 1020  Minutes: 20          Ginny Krueger MS, CCC-SLP, 4/4/2025       
bed mobility training, sup-sit, sit-stand.      Educated pt on role of OT, therapy POC and functional goals, progression w/ ADLs and transfers, importance of movement and OOB activity, d/c recommendations     Safety Measures: Gait belt used, Left in bed, Alarm in place    Assessment:  Pt is an 89 y o M admitted d/t JESSICA. Pt at baseline has assistance for ADLs, assistance for high level IADLs, and mod I for functional SPTs to/from w/c. Pt currently presents w/ deficits in ADL and high level IADL independence, functional ADL transfers, strength, and functional activity tolerance. Continued OT services recommended to increase safety and independence with ADL routine and to address remaining functional deficits. Pt would benefit from continued acute care OT services w/ discharge to Facility for moderate post-acute rehabilitation, anticipate 1-2 hours per day and 5 days per week.      Complexity: Moderate  Prognosis: Good, no significant barriers to participation at this time.   Occupational Therapy Plan  Times Per Week: 3+       Goals:  Pt will complete all aspects of bed mobility for EOB/OOB ADLs w/ SBA.  Pt will complete UB ADLs w/ min A.  Pt will complete LB ADLs w/ mod A.  Pt will complete all functional transfers to and from bed, chair, toilet, shower chair w/ min A.  Pt will complete all aspects of toileting task w/ mod A.  Pt will perform therex/theract in order to increase strength and functional activity tolerance necessary for increased independence w/ ADL routine.    Pt goal: go home, get stronger  Time Frame for STGs: discharge    Equipment: Continue to assess at next LOC    Time:   Time in: 1330  Time out: 1349  Total time: 19  Timed treatment minutes: 9        Electronically signed by:      CIRILO Fernández/MIMI  [id number]    
2:07 AM   Result Value Ref Range    pH, Trav 7.368 7.320 - 7.430    pCO2, Trav 58.1 (H) 38 - 54 mm Hg    PO2, Trav 23.0 23 - 48 mm Hg    HCO3, Venous 32.7 (H) 22 - 29 mmol/L    Positive Base Excess, Trav 5.9 (H) 0 - 3 mmol/L    Oxyhemoglobin 38.4 %    Carboxyhemoglobin 0.6 0.5 - 1.5 %    Methemoglobin 0.5 0.5 - 1.5 %    Pt Temp 37.0     Dov Test NOT APPLICABLE    POCT Glucose    Collection Time: 04/04/25  6:34 AM   Result Value Ref Range    POC Glucose 111 (H) 74 - 99 mg/dL        Imaging/Diagnostics Last 24 Hours   CT CERVICAL SPINE WO CONTRAST  Result Date: 4/4/2025  EXAM:  CT CERVICAL SPINE WO CONTRAST DATE OF EXAM:  4/4/2025 16:18 DEMOGRAPHICS: 89 years old Male CLINICAL STATEMENT: Fall COMPARISON: None available. DOSE OPTIMIZATION: CT radiation dose optimization techniques (automated exposure  control, and use of iterative reconstruction techniques, or adjustment of the mA and/or kV according to patient size) were used to limit patient radiation dose. FINDINGS: BONES: Normal vertebral body heights. No acute fracture. ALIGNMENT: Mild anterolisthesis of C5-6 and C7-T1 appears related to facet arthropathy. DEGENERATIVE CHANGES: - Arthritic changes at the atlantoaxial joint. - Moderate multilevel disc space narrowing, endplate spurring, and facet and uncovertebral arthropathy. SOFT TISSUE: The prevertebral soft tissue is unremarkable. IMPRESSION: 1. No acute fracture. 2. Moderate multilevel degenerative changes.  Dictated and Electronically Signed By: Jonny Calvert MD 4/4/2025 16:37        CT HEAD WO CONTRAST  Result Date: 4/4/2025  EXAM:  CT HEAD WO CONTRAST DATE OF EXAM:  4/4/2025 14:04 DEMOGRAPHICS: 89 years old Male CLINICAL STATEMENT: s/p fall, patient confused COMPARISON: 4/3/2025 DOSE OPTIMIZATION: CT radiation dose optimization techniques (automated exposure  control, and use of iterative reconstruction techniques, or adjustment of the mA and/or kV according to patient size) were used to limit patient 
Goal 3: Pt will complete bed <> chair transfers with modAx2  Long Term Goal 4: Pt will complete 3 sets of 10 reps of BLE AROM exercises       Education  Patient Education  Education Given To: Patient  Education Provided: Role of Therapy;Energy Conservation;Plan of Care;IADL Safety;ADL Adaptive Strategies;Transfer Training;Mobility Training;Fall Prevention Strategies;Equipment  Education Method: Demonstration;Verbal  Education Outcome: Verbalized understanding;Demonstrated understanding;Continued education needed        Time In: 1329  Time Out: 1349  Total Treatment Time: 20  Timed Code Treatment Minutes: 10        Sam Bartlett PT, DPT  License #: [id number]      
The appendix is normal. Colonic diverticulosis without changes of acute diverticulitis. No intraperitoneal free air or fluid. No abdominal or pelvic lymphadenopathy. Aortoiliac atherosclerosis  and aneurysm. Degenerative changes of the lumbar spine and hips. No acute osseous findings. IMPRESSION: 1. Small consolidation in the posterior right upper lobe could represent atelectasis, however, infection could be considered in the appropriate clinical setting. 2. No acute findings in the abdomen or pelvis. 3. Colonic diverticulosis without changes of acute diverticulitis. 4.Prostatomegaly.  Dictated and Electronically Signed By: Agustin Mcmillan 4/3/2025 18:50        CT SOFT TISSUE NECK WO CONTRAST  Result Date: 4/3/2025  PROCEDURE: CT SOFT TISSUE NECK WO CONTRAST DATE OF EXAM:  4/3/2025 18:30 INDICATION: poss obstsruction COMPARISON: None TECHNIQUE: Axial CT imaging of the neck was performed without intravenous contrast. Coronal and sagittal reformations were performed. DOSE OPTIMIZATION: CT radiation dose optimization techniques (automated exposure  control, and use of iterative reconstruction techniques, or adjustment of the mA and/or kV according to patient size) were used to limit patient radiation dose. FINDINGS: Soft tissue spaces of the neck are symmetric. No mass or fluid collection. Larynx is unremarkable. Parotid and mandibular glands are normal. Thyroid gland is normal. No cervical lymphadenopathy. Bilateral carotid calcification. No airway or upper  esophageal obstruction. Degenerative changes of the cervical spine. No acute osseous findings. Paranasal  sinuses are clear. Imaged lung is clear. IMPRESSION: 1. No acute findings in the neck. No mass or fluid collection. 2. Bilateral carotid calcification.  Dictated and Electronically Signed By: Agustin Mcmillan 4/3/2025 18:41        CT HEAD WO CONTRAST  Result Date: 4/3/2025  PROCEDURE: CT HEAD WO CONTRAST DATE OF EXAM:  4/3/2025 18:29 INDICATION: Altered 
and deep white matter hypoattenuation compatible with chronic microvascular ischemic changes. No CT evidence for acute territorial infarct. Arteriosclerosis of the cavernous carotid arteries. Orbits are unremarkable. Paranasal sinuses and mastoid air cells are clear. No acute osseous findings. IMPRESSION: 1. No acute intracranial findings. 2. Mild cerebral volume loss and white matter findings compatible with chronic microvascular ischemic changes.  Dictated and Electronically Signed By: Agustin Mcmillan 4/3/2025 18:38        XR CHEST PORTABLE  Result Date: 4/3/2025  PORTABLE CHEST Clinical History: Altered mental status DATE OF SERVICE:4/3/2025 17:18 Comparison: 2/20/25 Findings: Single AP portable chest is obtained which demonstrates no evidence of acute infiltrate, consolidation  or pleural effusion. Cardiac silhouette within normal  limits. Visualized osseous structures are grossly unremarkable. Trachea is midline. IMPRESSION: No evidence of acute cardiopulmonary process. This dictation was created with voice recognition software. Although every effort is made to review the dictation as it is transcribed, on occasion the spoken word can be misinterpreted by the technology leading to omissions or inappropriate words, phrases or sentences.  Dictated and Electronically Signed By: Bradly Guillermo DO 4/3/2025 17:35          Electronically signed by Liyah Keating MD on 4/5/2025 at 1:45 PM

## 2025-04-07 NOTE — CARE COORDINATION
Reviewed chart, discussed in IDR and spoke with pt's family at bedside. Plan is home with family and [facility].  Will need transportation home and has a ramp.  CM will continue to follow.

## 2025-04-09 LAB
MICROORGANISM SPEC CULT: NORMAL
SERVICE CMNT-IMP: NORMAL
SPECIMEN DESCRIPTION: NORMAL

## 2025-04-17 ENCOUNTER — HOSPITAL ENCOUNTER (OUTPATIENT)
Age: 89
Setting detail: SPECIMEN
Discharge: HOME | End: 2025-04-17
Payer: MEDICARE

## 2025-04-17 LAB
ALBUMIN SERPL-MCNC: 3 G/DL (ref 3.4–5)
ALBUMIN/GLOB SERPL: 1.2 {RATIO} (ref 1.1–2.2)
ALP SERPL-CCNC: 62 U/L (ref 40–129)
ALT SERPL-CCNC: 7 U/L (ref 10–40)
ANION GAP SERPL CALCULATED.3IONS-SCNC: 5 MMOL/L (ref 9–17)
AST SERPL-CCNC: 14 U/L (ref 15–37)
BASOPHILS # BLD: 0.04 K/UL
BASOPHILS NFR BLD: 1 % (ref 0–1)
BILIRUB SERPL-MCNC: 0.3 MG/DL (ref 0–1)
BUN SERPL-MCNC: 16 MG/DL (ref 7–20)
CALCIUM SERPL-MCNC: 8 MG/DL (ref 8.3–10.6)
CHLORIDE SERPL-SCNC: 106 MMOL/L (ref 99–110)
CHOLEST SERPL-MCNC: 139 MG/DL (ref 125–199)
CO2 SERPL-SCNC: 32 MMOL/L (ref 21–32)
CREAT SERPL-MCNC: 2 MG/DL (ref 0.8–1.3)
EOSINOPHIL # BLD: 0.39 K/UL
EOSINOPHILS RELATIVE PERCENT: 6 % (ref 0–3)
ERYTHROCYTE [DISTWIDTH] IN BLOOD BY AUTOMATED COUNT: 14.7 % (ref 11.7–14.9)
EST. AVERAGE GLUCOSE BLD GHB EST-MCNC: 108 MG/DL
GFR, ESTIMATED: 32 ML/MIN/1.73M2
GLUCOSE SERPL-MCNC: 103 MG/DL (ref 74–99)
HBA1C MFR BLD: 5.4 % (ref 4.2–6.3)
HCT VFR BLD AUTO: 32 % (ref 42–52)
HDLC SERPL-MCNC: 35 MG/DL
HGB BLD-MCNC: 9.3 G/DL (ref 13.5–18)
IMM GRANULOCYTES # BLD AUTO: 0.01 K/UL
IMM GRANULOCYTES NFR BLD: 0 %
LDLC SERPL CALC-MCNC: 89 MG/DL
LDLC SERPL DIRECT ASSAY-MCNC: 93 MG/DL
LYMPHOCYTES NFR BLD: 2.22 K/UL
LYMPHOCYTES RELATIVE PERCENT: 32 % (ref 24–44)
MCH RBC QN AUTO: 28.4 PG (ref 27–31)
MCHC RBC AUTO-ENTMCNC: 29.1 G/DL (ref 32–36)
MCV RBC AUTO: 97.9 FL (ref 78–100)
MONOCYTES NFR BLD: 0.77 K/UL
MONOCYTES NFR BLD: 11 % (ref 0–5)
NEUTROPHILS NFR BLD: 51 % (ref 36–66)
NEUTS SEG NFR BLD: 3.53 K/UL
PLATELET # BLD AUTO: 134 K/UL (ref 140–440)
PMV BLD AUTO: 10.1 FL (ref 7.5–11.1)
POTASSIUM SERPL-SCNC: 5.3 MMOL/L (ref 3.5–5.1)
PROT SERPL-MCNC: 5.5 G/DL (ref 6.4–8.2)
PSA SERPL-MCNC: 2.82 NG/ML (ref 0–4)
RBC # BLD AUTO: 3.27 M/UL (ref 4.6–6.2)
SODIUM SERPL-SCNC: 144 MMOL/L (ref 136–145)
T4 FREE SERPL-MCNC: 1.2 NG/DL (ref 0.9–1.8)
TRIGL SERPL-MCNC: 77 MG/DL
TSH SERPL DL<=0.05 MIU/L-ACNC: 1.7 UIU/ML (ref 0.27–4.2)
WBC OTHER # BLD: 7 K/UL (ref 4–10.5)

## 2025-04-17 PROCEDURE — 84439 ASSAY OF FREE THYROXINE: CPT

## 2025-04-17 PROCEDURE — 84153 ASSAY OF PSA TOTAL: CPT

## 2025-04-17 PROCEDURE — 83036 HEMOGLOBIN GLYCOSYLATED A1C: CPT

## 2025-04-17 PROCEDURE — 85025 COMPLETE CBC W/AUTO DIFF WBC: CPT

## 2025-04-17 PROCEDURE — 84443 ASSAY THYROID STIM HORMONE: CPT

## 2025-04-17 PROCEDURE — 80061 LIPID PANEL: CPT

## 2025-04-17 PROCEDURE — 80053 COMPREHEN METABOLIC PANEL: CPT

## 2025-04-17 PROCEDURE — 83721 ASSAY OF BLOOD LIPOPROTEIN: CPT

## 2025-04-21 ENCOUNTER — HOSPITAL ENCOUNTER (OUTPATIENT)
Age: 89
Setting detail: SPECIMEN
Discharge: HOME | End: 2025-04-21
Payer: MEDICARE

## 2025-04-21 LAB — POTASSIUM SERPL-SCNC: 4.7 MMOL/L (ref 3.5–5.1)

## 2025-04-21 PROCEDURE — 84132 ASSAY OF SERUM POTASSIUM: CPT
